# Patient Record
Sex: MALE | Race: WHITE | NOT HISPANIC OR LATINO | Employment: OTHER | URBAN - METROPOLITAN AREA
[De-identification: names, ages, dates, MRNs, and addresses within clinical notes are randomized per-mention and may not be internally consistent; named-entity substitution may affect disease eponyms.]

---

## 2017-01-03 ENCOUNTER — OFFICE VISIT (OUTPATIENT)
Dept: UROLOGY | Facility: CLINIC | Age: 57
End: 2017-01-03
Payer: MEDICARE

## 2017-01-03 VITALS
HEART RATE: 71 BPM | HEIGHT: 69 IN | WEIGHT: 235 LBS | BODY MASS INDEX: 34.8 KG/M2 | DIASTOLIC BLOOD PRESSURE: 80 MMHG | SYSTOLIC BLOOD PRESSURE: 120 MMHG

## 2017-01-03 DIAGNOSIS — N50.819 ORCHALGIA: Primary | ICD-10-CM

## 2017-01-03 PROCEDURE — 99213 OFFICE O/P EST LOW 20 MIN: CPT | Mod: PBBFAC | Performed by: UROLOGY

## 2017-01-03 PROCEDURE — 99214 OFFICE O/P EST MOD 30 MIN: CPT | Mod: S$PBB,,, | Performed by: UROLOGY

## 2017-01-03 PROCEDURE — 99999 PR PBB SHADOW E&M-EST. PATIENT-LVL III: CPT | Mod: PBBFAC,,, | Performed by: UROLOGY

## 2017-01-03 RX ORDER — TIZANIDINE 4 MG/1
TABLET ORAL
Refills: 2 | COMMUNITY
Start: 2016-12-13

## 2017-01-03 RX ORDER — MIRTAZAPINE 30 MG/1
TABLET, FILM COATED ORAL
Refills: 3 | COMMUNITY
Start: 2016-12-19 | End: 2017-01-05 | Stop reason: ALTCHOICE

## 2017-01-03 NOTE — MR AVS SNAPSHOT
[facility]  [address]  [place] [address]  Phone: [phone]                  Min Nunez   1/3/2017 10:30 AM   Office Visit    Description:  Male : 1960   Provider:  MARIA DE JESUS Hendrickson MD   Department:  [facility]           Reason for Visit     Follow-up           Diagnoses this Visit        Comments    Orchalgia    -  Primary            To Do List           Future Appointments        Provider Department Dept Phone    1/3/2017 10:30 AM MARIA DE JESUS Hendrickson MD [facility] [phone]    2/15/2017 10:30 AM Altaf Santacruz PA-C [facility] - Orthoped[facility] [phone]      Goals (5 Years of Data)     None      Follow-Up and Disposition     Return in about 3 months (around 4/3/2017) for Review X-ray.      [facility]     [facility] On Call Nurse [facility] -  Assistance  Registered nurses in the [facility] On Call Center provide clinical advisement, health education, appointment booking, and other advisory services.  Call for this free service at [phone].             Medications           Message regarding Medications     Verify the changes and/or additions to your medication regime listed below are the same as discussed with your clinician today.  If any of these changes or additions are incorrect, please notify your healthcare provider.             Verify that the below list of medications is an accurate representation of the medications you are currently taking.  If none reported, the list may be blank. If incorrect, please contact your healthcare provider. Carry this list with you in case of emergency.           Current Medications     albuterol (PROAIR HFA) 90 mcg/actuation inhaler Inhale 2 puffs into the lungs every 6 (six) hours as needed for Wheezing.    alprazolam (XANAX) 0.5 MG tablet Take 1 tablet (0.5 mg total) by mouth 2 (two) times daily as needed for Anxiety.    ASPIR-LOW 81 mg EC tablet Take 81 mg by mouth once daily.    busPIRone  (BUSPAR) 15 MG tablet Take 1 tablet (15 mg total) by mouth 3 (three) times daily.    chlorhexidine (PERIDEX) 0.12 % solution swish AND SPIT FIFTEEN MILLILITERS BY MOUTH TWICE DAILY    citalopram (CELEXA) 40 MG tablet Take 1 tablet (40 mg total) by mouth once daily.    divalproex (DEPAKOTE) 250 MG EC tablet 1 pill in morning, 1 pill in evening    eszopiclone (LUNESTA) 1 MG Tab Take 1 tablet (1 mg total) by mouth nightly as needed.    fenofibrate 160 MG Tab Take 160 mg by mouth once daily.    gabapentin (NEURONTIN) 300 MG capsule Take 300 mg by mouth once daily.    hydrOXYzine pamoate (VISTARIL) 25 MG Cap 1 pill 3x a day for 2 days, rest left over every 8 hours as needed for migraine    irbesartan (AVAPRO) 300 MG tablet Take 300 mg by mouth once daily.    isosorbide mononitrate (IMDUR) 60 MG 24 hr tablet Take 60 mg by mouth every morning.    meloxicam (MOBIC) 7.5 MG tablet Take 7.5 mg by mouth 2 (two) times daily.     metoprolol succinate (TOPROL-XL) 25 MG 24 hr tablet Take 25 mg by mouth 2 (two) times daily.     mirabegron (MYRBETRIQ) 25 mg Tb24 ER tablet Take 1 tablet (25 mg total) by mouth once daily.    mirtazapine (REMERON) 30 MG tablet Take 1 tablet (30 mg total) by mouth every evening.    nitroGLYCERIN (NITROSTAT) 0.4 MG SL tablet Place 0.4 mg under the tongue every 5 (five) minutes as needed for Chest pain.    NON FORMULARY MEDICATION Apply topically 4 (four) times daily as needed. Gabapentin 5%, Orphenadrine 2%, Lidocaine 2%, Prilocaine 2%    oxycodone-acetaminophen (PERCOCET)  mg per tablet Take 1 tablet by mouth every 4 (four) hours as needed for Pain.    pravastatin (PRAVACHOL) 80 MG tablet Take 80 mg by mouth once daily.    RANEXA 1,000 mg Tb12 Take 1,000 mg by mouth 2 (two) times daily.    RESTASIS 0.05 % ophthalmic emulsion Place 1 drop into both eyes 2 (two) times daily.    solifenacin (VESICARE) 10 MG tablet Take 1 tablet (10 mg total) by mouth once daily.    tamsulosin (FLOMAX) 0.4 mg Cp24 Take  "1 capsule (0.4 mg total) by mouth once daily.    tizanidine (ZANAFLEX) 4 MG tablet ONE TABLET BY MOUTH IN THE EVENING    trazodone (DESYREL) 150 MG tablet Take 2 tablets (300 mg total) by mouth nightly.           Clinical Reference Information           Vital Signs - Last Recorded  Most recent update: 1/3/2017  9:45 AM by Olivia Goss LPN    BP Pulse Ht Wt BMI    120/80 71 5' 9" (1.753 m) 106.6 kg (235 lb) 34.7 kg/m2      Blood Pressure          Most Recent Value    BP  120/80      Allergies as of 1/3/2017     No Known Allergies      Immunizations Administered on Date of Encounter - 1/3/2017     None      Orders Placed During Today's Visit     Future Labs/Procedures Expected by Expires    US Scrotum And Testicles  4/3/2017 1/3/2018      "

## 2017-01-03 NOTE — PROGRESS NOTES
Subjective:       Patient ID: Min Nunez is a 56 y.o. male who was last seen in this office 10/4/2016    Chief Complaint:   Chief Complaint   Patient presents with    Follow-up     testicular swelling       Orchalgia  He had a bilateral epididymectomy on 8/5/2016.  He continues to have right sided swelling and bilateral pain.  His right sided pain is worse than left.      Urinary frequency and Urgency Incontinence  He has some frequency and urgency that is now improving with Ditropan XL 10 mg.  He was using about 4 pads a day but is now only using about 2 napkins.  He would like additional medication.    ACTIVE MEDICAL ISSUES:  Patient Active Problem List   Diagnosis    Chest pain, exertional    Coronary artery disease, occlusive    Coronary atherosclerosis of unspecified type of vessel, native or graft    Orchalgia    Dizziness    Syncope    Abnormal brain MRI    Migraines    Atypical migraine    HA (headache)    Numbness    Facial droop    Faintness    Renal stone    Groin pain    Constipation    Hypotension       ALLERGIES AND MEDICATIONS: updated and reviewed.  Review of patient's allergies indicates:  No Known Allergies  Current Outpatient Prescriptions   Medication Sig    albuterol (PROAIR HFA) 90 mcg/actuation inhaler Inhale 2 puffs into the lungs every 6 (six) hours as needed for Wheezing.    alprazolam (XANAX) 0.5 MG tablet Take 1 tablet (0.5 mg total) by mouth 2 (two) times daily as needed for Anxiety.    ASPIR-LOW 81 mg EC tablet Take 81 mg by mouth once daily.    busPIRone (BUSPAR) 15 MG tablet Take 1 tablet (15 mg total) by mouth 3 (three) times daily.    chlorhexidine (PERIDEX) 0.12 % solution swish AND SPIT FIFTEEN MILLILITERS BY MOUTH TWICE DAILY    citalopram (CELEXA) 40 MG tablet Take 1 tablet (40 mg total) by mouth once daily.    divalproex (DEPAKOTE) 250 MG EC tablet 1 pill in morning, 1 pill in evening (Patient taking differently: 1 pill in morning, 2 pills  in  evening)    eszopiclone (LUNESTA) 1 MG Tab Take 1 tablet (1 mg total) by mouth nightly as needed.    fenofibrate 160 MG Tab Take 160 mg by mouth once daily.    gabapentin (NEURONTIN) 300 MG capsule Take 300 mg by mouth once daily.    hydrOXYzine pamoate (VISTARIL) 25 MG Cap 1 pill 3x a day for 2 days, rest left over every 8 hours as needed for migraine    irbesartan (AVAPRO) 300 MG tablet Take 300 mg by mouth once daily.    isosorbide mononitrate (IMDUR) 60 MG 24 hr tablet Take 60 mg by mouth every morning.    meloxicam (MOBIC) 7.5 MG tablet Take 7.5 mg by mouth 2 (two) times daily.     metoprolol succinate (TOPROL-XL) 25 MG 24 hr tablet Take 25 mg by mouth 2 (two) times daily.     mirabegron (MYRBETRIQ) 25 mg Tb24 ER tablet Take 1 tablet (25 mg total) by mouth once daily.    mirtazapine (REMERON) 30 MG tablet Take 1 tablet (30 mg total) by mouth every evening.    nitroGLYCERIN (NITROSTAT) 0.4 MG SL tablet Place 0.4 mg under the tongue every 5 (five) minutes as needed for Chest pain.    NON FORMULARY MEDICATION Apply topically 4 (four) times daily as needed. Gabapentin 5%, Orphenadrine 2%, Lidocaine 2%, Prilocaine 2%    oxycodone-acetaminophen (PERCOCET)  mg per tablet Take 1 tablet by mouth every 4 (four) hours as needed for Pain.    pravastatin (PRAVACHOL) 80 MG tablet Take 80 mg by mouth once daily.    RANEXA 1,000 mg Tb12 Take 1,000 mg by mouth 2 (two) times daily.    RESTASIS 0.05 % ophthalmic emulsion Place 1 drop into both eyes 2 (two) times daily.    solifenacin (VESICARE) 10 MG tablet Take 1 tablet (10 mg total) by mouth once daily.    tamsulosin (FLOMAX) 0.4 mg Cp24 Take 1 capsule (0.4 mg total) by mouth once daily.    tizanidine (ZANAFLEX) 4 MG tablet ONE TABLET BY MOUTH IN THE EVENING    trazodone (DESYREL) 150 MG tablet Take 2 tablets (300 mg total) by mouth nightly.     No current facility-administered medications for this visit.        Review of Systems   Constitutional:  "Negative for activity change, fatigue, fever and unexpected weight change.   HENT: Negative for congestion.    Eyes: Negative for redness.   Respiratory: Negative for chest tightness and shortness of breath.    Cardiovascular: Negative for chest pain and leg swelling.   Gastrointestinal: Negative for abdominal pain, constipation, diarrhea, nausea and vomiting.   Genitourinary: Positive for frequency, scrotal swelling and testicular pain. Negative for dysuria, flank pain, hematuria, penile pain, penile swelling and urgency.   Musculoskeletal: Negative for arthralgias and back pain.   Neurological: Negative for dizziness and light-headedness.   Psychiatric/Behavioral: Negative for behavioral problems and confusion. The patient is not nervous/anxious.    All other systems reviewed and are negative.      Objective:      Vitals:    01/03/17 0943   BP: 120/80   Pulse: 71   Weight: 106.6 kg (235 lb)   Height: 5' 9" (1.753 m)     Physical Exam   Nursing note and vitals reviewed.  Constitutional: He is oriented to person, place, and time. He appears well-developed and well-nourished.   HENT:   Head: Normocephalic.   Eyes: Conjunctivae are normal.   Neck: Normal range of motion. Neck supple. No tracheal deviation present. No thyromegaly present.   Cardiovascular: Normal rate and normal heart sounds.    Pulmonary/Chest: Effort normal and breath sounds normal. No respiratory distress. He has no wheezes.   Abdominal: Soft. Bowel sounds are normal. There is no hepatosplenomegaly. There is no tenderness. There is no rebound and no CVA tenderness. No hernia.   Genitourinary: Penis normal.   Genitourinary Comments: Both testicles palpably normal.  Right cord with a fluid collection.   Musculoskeletal: Normal range of motion. He exhibits no edema or tenderness.   Lymphadenopathy:     He has no cervical adenopathy.   Neurological: He is alert and oriented to person, place, and time.   Skin: Skin is warm and dry. No rash noted. No " erythema.     Psychiatric: He has a normal mood and affect. His behavior is normal. Judgment and thought content normal.       Urine dipstick shows negative for all components.  Micro exam: negative for WBC's or RBC's.      US Scrotum And Testicles   Status: Final result   MyChart Results Release   MyChart Status: Active Results Release   Signed by   Signed Credentials Date/Time    Phone Pager   JOSEPH IWLCOX MD 12/01/2016 07:52 [phone]   PACS Images   Show images for US Scrotum And Testicles   Reviewed By List   Halle Whipple MA on 12/1/2016  8:34 AM   MARIA DE JESUS Hendrickson MD on 12/1/2016  7:53 AM   External Result Report   External Result Report   Narrative   Scrotal ultrasound    Grayscale, color and spectral Doppler imaging of bilateral testicles was performed.    The right testicle measures 4.7 x 3.1 x 3.1 cm. The left testicle measures 4.3 x 2.7 x 3.0 cm. Both have a homogenous echotexture with normal flow. Symmetric flow seen between both testicles.    There is a 4.7 cm spermatocele seen within the right epididymal head. There is normal appearance of the left epididymal head, the spermatocele appears to contain internal debris. Its appearance is similar to as in prior ultrasound.   Impression    There is a large right spermatocele. Otherwise there is a normal sonographic appearance of the testicles.      Electronically signed by: JOSEPH WILCOX MD  Date: 12/01/16  Time: 07:52     Encounter   View Encounter      Reviewed By   Halle Whipple MA on 12/1/2016  8:34 AM   MARIA DE JESUS Hendrickson MD on 12/1/2016  7:53 AM       Assessment:       1. Orchalgia          Plan:       1. Orchalgia    - US Scrotum And Testicles; Future  He most likely has a hydrocele of the spermatic cord and not a spermatocele since he is s/p epididymectomy.    I do not recommend any kind of exercise that would put pressure on his perineum or scrotum.              Return in about 3 months (around 4/3/2017) for Review  X-ray.

## 2017-01-05 ENCOUNTER — OFFICE VISIT (OUTPATIENT)
Dept: PSYCHIATRY | Facility: CLINIC | Age: 57
End: 2017-01-05
Payer: MEDICARE

## 2017-01-05 VITALS
DIASTOLIC BLOOD PRESSURE: 62 MMHG | HEART RATE: 81 BPM | WEIGHT: 243 LBS | BODY MASS INDEX: 35.99 KG/M2 | HEIGHT: 69 IN | SYSTOLIC BLOOD PRESSURE: 110 MMHG

## 2017-01-05 DIAGNOSIS — F41.9 ANXIETY: Primary | ICD-10-CM

## 2017-01-05 PROCEDURE — 99214 OFFICE O/P EST MOD 30 MIN: CPT | Mod: S$PBB,,, | Performed by: PSYCHIATRY & NEUROLOGY

## 2017-01-05 PROCEDURE — 99212 OFFICE O/P EST SF 10 MIN: CPT | Mod: PBBFAC | Performed by: PSYCHIATRY & NEUROLOGY

## 2017-01-05 PROCEDURE — 99999 PR PBB SHADOW E&M-EST. PATIENT-LVL II: CPT | Mod: PBBFAC,,, | Performed by: PSYCHIATRY & NEUROLOGY

## 2017-01-05 RX ORDER — TRAZODONE HYDROCHLORIDE 150 MG/1
300 TABLET ORAL NIGHTLY
Qty: 60 TABLET | Refills: 3 | Status: SHIPPED | OUTPATIENT
Start: 2017-01-05 | End: 2017-04-11 | Stop reason: SDUPTHER

## 2017-01-05 RX ORDER — ALPRAZOLAM 0.5 MG/1
0.5 TABLET ORAL 2 TIMES DAILY PRN
Qty: 30 TABLET | Refills: 2 | Status: SHIPPED | OUTPATIENT
Start: 2017-01-05 | End: 2017-04-11 | Stop reason: SDUPTHER

## 2017-01-05 RX ORDER — BUSPIRONE HYDROCHLORIDE 15 MG/1
15 TABLET ORAL 3 TIMES DAILY
Qty: 90 TABLET | Refills: 3 | Status: SHIPPED | OUTPATIENT
Start: 2017-01-05 | End: 2017-04-11 | Stop reason: SDUPTHER

## 2017-01-05 RX ORDER — CITALOPRAM 40 MG/1
40 TABLET, FILM COATED ORAL DAILY
Qty: 30 TABLET | Refills: 3 | Status: SHIPPED | OUTPATIENT
Start: 2017-01-05 | End: 2017-04-11 | Stop reason: SDUPTHER

## 2017-01-05 NOTE — MR AVS SNAPSHOT
[facility]  [address]  [facility] [address]  Phone: [phone]  Fax: [phone]                  Min Nunez   2017 4:30 PM   Office Visit    Description:  Male : 1960   Provider:  Sampson Luke MD   Department:  [facility]           Diagnoses this Visit        Comments    Anxiety    -  Primary            To Do List           Future Appointments        Provider Department Dept Phone    2017 4:30 PM Sampson Luke MD [facility] [phone]    2/15/2017 10:30 AM Altaf Santacruz PA-C [facility] [phone]    4/3/2017 11:15 AM [place] [address] - ROOM 2 [facility] [place] [phone]    2017 10:15 AM MARIA DE JESUS Hendrickson MD [facility] [phone]    2017 2:00 PM Sampson Luke MD [facility] [phone]      Goals (5 Years of Data)     None       These Medications        Disp Refills Start End    trazodone (DESYREL) 150 MG tablet 60 tablet 3 2017    Take 2 tablets (300 mg total) by mouth nightly. - Oral    Pharmacy: [facility] - [address] Ph #: [phone]       alprazolam (XANAX) 0.5 MG tablet 30 tablet 2 2017    Take 1 tablet (0.5 mg total) by mouth 2 (two) times daily as needed for Anxiety. - Oral    Pharmacy: [facility] - [facility] [address] Ph #: [phone]         [facility] On Call     [facility] On Call Nurse Care Line -  Assistance  Registered nurses in the [facility] On Call Center provide clinical advisement, health education, appointment booking, and other advisory services.  Call for this free service at [phone].             Medications           Message regarding Medications     Verify the changes and/or additions to your medication regime listed below are the same as discussed with your clinician today.  If any of these changes or additions are incorrect, please  notify your healthcare provider.        STOP taking these medications     mirtazapine (REMERON) 30 MG tablet Take 1 tablet (30 mg total) by mouth every evening.    eszopiclone (LUNESTA) 1 MG Tab Take 1 tablet (1 mg total) by mouth nightly as needed.           Verify that the below list of medications is an accurate representation of the medications you are currently taking.  If none reported, the list may be blank. If incorrect, please contact your healthcare provider. Carry this list with you in case of emergency.           Current Medications     albuterol (PROAIR HFA) 90 mcg/actuation inhaler Inhale 2 puffs into the lungs every 6 (six) hours as needed for Wheezing.    alprazolam (XANAX) 0.5 MG tablet Take 1 tablet (0.5 mg total) by mouth 2 (two) times daily as needed for Anxiety.    ASPIR-LOW 81 mg EC tablet Take 81 mg by mouth once daily.    busPIRone (BUSPAR) 15 MG tablet Take 1 tablet (15 mg total) by mouth 3 (three) times daily.    chlorhexidine (PERIDEX) 0.12 % solution swish AND SPIT FIFTEEN MILLILITERS BY MOUTH TWICE DAILY    citalopram (CELEXA) 40 MG tablet Take 1 tablet (40 mg total) by mouth once daily.    divalproex (DEPAKOTE) 250 MG EC tablet 1 pill in morning, 1 pill in evening    fenofibrate 160 MG Tab Take 160 mg by mouth once daily.    gabapentin (NEURONTIN) 300 MG capsule Take 300 mg by mouth once daily.    hydrOXYzine pamoate (VISTARIL) 25 MG Cap 1 pill 3x a day for 2 days, rest left over every 8 hours as needed for migraine    irbesartan (AVAPRO) 300 MG tablet Take 300 mg by mouth once daily.    isosorbide mononitrate (IMDUR) 60 MG 24 hr tablet Take 60 mg by mouth every morning.    meloxicam (MOBIC) 7.5 MG tablet Take 7.5 mg by mouth 2 (two) times daily.     metoprolol succinate (TOPROL-XL) 25 MG 24 hr tablet Take 25 mg by mouth 2 (two) times daily.     mirabegron (MYRBETRIQ) 25 mg Tb24 ER tablet Take 1 tablet (25 mg total) by mouth once daily.    nitroGLYCERIN (NITROSTAT) 0.4 MG SL tablet  "Place 0.4 mg under the tongue every 5 (five) minutes as needed for Chest pain.    NON FORMULARY MEDICATION Apply topically 4 (four) times daily as needed. Gabapentin 5%, Orphenadrine 2%, Lidocaine 2%, Prilocaine 2%    oxycodone-acetaminophen (PERCOCET)  mg per tablet Take 1 tablet by mouth every 4 (four) hours as needed for Pain.    pravastatin (PRAVACHOL) 80 MG tablet Take 80 mg by mouth once daily.    RANEXA 1,000 mg Tb12 Take 1,000 mg by mouth 2 (two) times daily.    RESTASIS 0.05 % ophthalmic emulsion Place 1 drop into both eyes 2 (two) times daily.    solifenacin (VESICARE) 10 MG tablet Take 1 tablet (10 mg total) by mouth once daily.    tamsulosin (FLOMAX) 0.4 mg Cp24 Take 1 capsule (0.4 mg total) by mouth once daily.    tizanidine (ZANAFLEX) 4 MG tablet ONE TABLET BY MOUTH IN THE EVENING    trazodone (DESYREL) 150 MG tablet Take 2 tablets (300 mg total) by mouth nightly.           Clinical Reference Information           Vital Signs - Last Recorded  Most recent update: 1/5/2017  3:54 PM by Mendoza Payne    BP Pulse Ht Wt BMI    110/62 81 5' 9" (1.753 m) 110.2 kg (243 lb) 35.88 kg/m2      Blood Pressure          Most Recent Value    BP  110/62      Allergies as of 1/5/2017     No Known Allergies      Immunizations Administered on Date of Encounter - 1/5/2017     None      "

## 2017-01-05 NOTE — PROGRESS NOTES
ESTABLISHED OUTPATIENT VISIT   E/M LEVEL 4: [id number]    ENCOUNTER DATE: 1/5/2017  SITE: [facility]    HISTORY    CHIEF COMPLAINT   Min Nunez is a 56 y.o. male who presents for follow up of anxiety/depression.      HPI   Anxiety continues.    Psychosocial stress related to son, wife, and physical problems.    Psychiatric Review Of Systems - Is patient experiencing or having changes in:  sleep: Trazodone  appetite: no  weight: no  energy/anergy: no  interest/pleasure/anhedonia: no  somatic symptoms: no  libido: no  anxiety/panic: much anxiety, panic attacks at times  guilty/hopelessness: no  concentration: no  S.I.B.s/risky behavior: no  Irritability: no  Racing thoughts: no  Impulsive behaviors: no  Paranoia:no  AVH:no    Recent alcohol: no  Recent drug: no    Medical ROS   Multiple physical problems     PAST MEDICAL, FAMILY AND SOCIAL HISTORY: The patient's past medical, family and social history have been reviewed and updated as appropriate within the electronic medical record - see encounter notes.    PSYCHOTROPIC MEDICATIONS   Xanax prn, Trazodone varying doses at bedtime, Buspar 15 mg tid, Celexa 40 mg qam, Depakote 250 mg qam, Depakote 500 mg at bedtime[for headaches], Neurontin 300 mg at bedtime[for pain], Hydroxyzine prn for migraines    Scheduled and PRN Medications     Current Outpatient Prescriptions:     albuterol (PROAIR HFA) 90 mcg/actuation inhaler, Inhale 2 puffs into the lungs every 6 (six) hours as needed for Wheezing., Disp: , Rfl:     alprazolam (XANAX) 0.5 MG tablet, Take 1 tablet (0.5 mg total) by mouth 2 (two) times daily as needed for Anxiety., Disp: 30 tablet, Rfl: 2    ASPIR-LOW 81 mg EC tablet, Take 81 mg by mouth once daily., Disp: , Rfl: 3    busPIRone (BUSPAR) 15 MG tablet, Take 1 tablet (15 mg total) by mouth 3 (three) times daily., Disp: 90 tablet, Rfl: 3    chlorhexidine (PERIDEX) 0.12 % solution, swish AND SPIT FIFTEEN MILLILITERS BY MOUTH TWICE  DAILY, Disp: , Rfl: 0    citalopram (CELEXA) 40 MG tablet, Take 1 tablet (40 mg total) by mouth once daily., Disp: 30 tablet, Rfl: 3    divalproex (DEPAKOTE) 250 MG EC tablet, 1 pill in morning, 1 pill in evening (Patient taking differently: 1 pill in morning, 2 pills  in evening), Disp: 60 tablet, Rfl: 6    eszopiclone (LUNESTA) 1 MG Tab, Take 1 tablet (1 mg total) by mouth nightly as needed., Disp: , Rfl: 3    fenofibrate 160 MG Tab, Take 160 mg by mouth once daily., Disp: , Rfl:     gabapentin (NEURONTIN) 300 MG capsule, Take 300 mg by mouth once daily., Disp: , Rfl:     hydrOXYzine pamoate (VISTARIL) 25 MG Cap, 1 pill 3x a day for 2 days, rest left over every 8 hours as needed for migraine, Disp: 15 capsule, Rfl: 3    irbesartan (AVAPRO) 300 MG tablet, Take 300 mg by mouth once daily., Disp: , Rfl:     isosorbide mononitrate (IMDUR) 60 MG 24 hr tablet, Take 60 mg by mouth every morning., Disp: , Rfl: 3    meloxicam (MOBIC) 7.5 MG tablet, Take 7.5 mg by mouth 2 (two) times daily. , Disp: , Rfl:     metoprolol succinate (TOPROL-XL) 25 MG 24 hr tablet, Take 25 mg by mouth 2 (two) times daily. , Disp: , Rfl:     mirabegron (MYRBETRIQ) 25 mg Tb24 ER tablet, Take 1 tablet (25 mg total) by mouth once daily., Disp: 90 tablet, Rfl: 3    mirtazapine (REMERON) 30 MG tablet, Take 1 tablet (30 mg total) by mouth every evening., Disp: , Rfl: 3    nitroGLYCERIN (NITROSTAT) 0.4 MG SL tablet, Place 0.4 mg under the tongue every 5 (five) minutes as needed for Chest pain., Disp: , Rfl:     NON FORMULARY MEDICATION, Apply topically 4 (four) times daily as needed. Gabapentin 5%, Orphenadrine 2%, Lidocaine 2%, Prilocaine 2%, Disp: , Rfl:     oxycodone-acetaminophen (PERCOCET)  mg per tablet, Take 1 tablet by mouth every 4 (four) hours as needed for Pain., Disp: 30 tablet, Rfl: 0    pravastatin (PRAVACHOL) 80 MG tablet, Take 80 mg by mouth once daily., Disp: , Rfl: 4    RANEXA 1,000 mg Tb12, Take 1,000 mg by  mouth 2 (two) times daily., Disp: , Rfl: 3    RESTASIS 0.05 % ophthalmic emulsion, Place 1 drop into both eyes 2 (two) times daily., Disp: , Rfl: 4    solifenacin (VESICARE) 10 MG tablet, Take 1 tablet (10 mg total) by mouth once daily., Disp: 90 tablet, Rfl: 3    tamsulosin (FLOMAX) 0.4 mg Cp24, Take 1 capsule (0.4 mg total) by mouth once daily., Disp: 90 capsule, Rfl: 3    tizanidine (ZANAFLEX) 4 MG tablet, ONE TABLET BY MOUTH IN THE EVENING, Disp: , Rfl: 2    trazodone (DESYREL) 150 MG tablet, Take 2 tablets (300 mg total) by mouth nightly., Disp: 60 tablet, Rfl: 3    EXAMINATION    Vitals:    01/05/17 1550   BP: 110/62   Pulse: 81     Weight 243 lb's    CONSTITUTIONAL  General Appearance: well nourished    MUSCULOSKELETAL  Muscle Strength and Tone: normal strength and tone  Abnormal Involuntary Movements: no abnormal movement noted  Gait and Station: normal gait    PSYCHIATRIC   Level of Consciousness: alert  Orientation: oriented to person, place and time  Grooming: well groomed  Psychomotor Behavior: no restlessness/agitation  Speech: normal in rate, rhythm and volume  Language: normal vocabulary  Mood: anxious at times  Affect: full range and appropriate  Thought Process: logical and goal directed  Associations: intact associations  Thought Content: no SI/HI  Memory: grossly intact  Attention: intact to content of interview  Fund of Knowledge: appears adequate  Insight: good  Judgement: good    MEDICAL DECISION MAKING    DIAGNOSES  Anxiety d/o, unspecified    PROBLEM LIST AND MANAGEMENT PLANS    - anxiety:   Trazodone 300 mg at bedtime  continue Xanax prn  Continue Celexa and Buspar as above for now  - rtc one month     Time with patient: 20 min    LABORATORY DATA  Admission on 11/08/2016, Discharged on 11/08/2016   Component Date Value Ref Range Status    Specimen UA 11/08/2016 Urine, Clean Catch   Final    Color, UA 11/08/2016 Yellow  Yellow, Straw, Leonila Final    Appearance, UA 11/08/2016 Clear   Clear Final    pH, UA 11/08/2016 8.0  5.0 - 8.0 Final    Specific [place], UA 11/08/2016 1.020  1.005 - 1.030 Final    Protein, UA 11/08/2016 Trace* Negative Final    Comment: Recommend a 24 hour urine protein or a urine   protein/creatinine ratio if globulin induced proteinuria is  clinically suspected.      Glucose, UA 11/08/2016 Negative  Negative Final    Ketones, UA 11/08/2016 Negative  Negative Final    Bilirubin (UA) 11/08/2016 Negative  Negative Final    Occult Blood UA 11/08/2016 Negative  Negative Final    Nitrite, UA 11/08/2016 Negative  Negative Final    Urobilinogen, UA 11/08/2016 Negative  <2.0 EU/dL Final    Leukocytes, UA 11/08/2016 Negative  Negative Final    WBC 11/08/2016 5.33  3.90 - 12.70 K/uL Final    RBC 11/08/2016 4.90  4.60 - 6.20 M/uL Final    Hemoglobin 11/08/2016 14.6  14.0 - 18.0 g/dL Final    Hematocrit 11/08/2016 42.6  40.0 - 54.0 % Final    MCV 11/08/2016 87  82 - 98 fL Final    MCH 11/08/2016 29.8  27.0 - 31.0 pg Final    MCHC 11/08/2016 34.3  32.0 - 36.0 % Final    RDW 11/08/2016 14.4  11.5 - 14.5 % Final    Platelets 11/08/2016 157  150 - 350 K/uL Final    MPV 11/08/2016 11.6  9.2 - 12.9 fL Final    Gran # 11/08/2016 2.2  1.8 - 7.7 K/uL Final    Lymph # 11/08/2016 2.3  1.0 - 4.8 K/uL Final    Mono # 11/08/2016 0.7  0.3 - 1.0 K/uL Final    Eos # 11/08/2016 0.1  0.0 - 0.5 K/uL Final    Baso # 11/08/2016 0.04  0.00 - 0.20 K/uL Final    Gran% 11/08/2016 41.0  38.0 - 73.0 % Final    Lymph% 11/08/2016 43.9  18.0 - 48.0 % Final    Mono% 11/08/2016 12.8  4.0 - 15.0 % Final    Eosinophil% 11/08/2016 1.1  0.0 - 8.0 % Final    Basophil% 11/08/2016 0.8  0.0 - 1.9 % Final    Differential Method 11/08/2016 Automated   Final    Lipase 11/08/2016 35  4 - 60 U/L Final    Sodium 11/08/2016 143  136 - 145 mmol/L Final    Potassium 11/08/2016 4.2  3.5 - 5.1 mmol/L Final    Chloride 11/08/2016 108  95 - 110 mmol/L Final    CO2 11/08/2016 27  23 - 29 mmol/L Final     Glucose 11/08/2016 103  70 - 110 mg/dL Final    BUN, Bld 11/08/2016 12  6 - 20 mg/dL Final    Creatinine 11/08/2016 0.9  0.5 - 1.4 mg/dL Final    Calcium 11/08/2016 8.7  8.7 - 10.5 mg/dL Final    Total Protein 11/08/2016 7.6  6.0 - 8.4 g/dL Final    Albumin 11/08/2016 3.9  3.5 - 5.2 g/dL Final    Total Bilirubin 11/08/2016 0.4  0.1 - 1.0 mg/dL Final    Comment: For infants and newborns, interpretation of results should be based  on gestational age, weight and in agreement with clinical  observations.  Premature Infant recommended reference ranges:  Up to 24 hours.............<8.0 mg/dL  Up to 48 hours............<12.0 mg/dL  3-5 days..................<15.0 mg/dL  6-29 days.................<15.0 mg/dL      Alkaline Phosphatase 11/08/2016 57  55 - 135 U/L Final    AST 11/08/2016 24  10 - 40 U/L Final    ALT 11/08/2016 39  10 - 44 U/L Final    Anion Gap 11/08/2016 8  8 - 16 mmol/L Final    eGFR if African American 11/08/2016 >60.0  >60 mL/min/1.73 m^2 Final    eGFR if non African American 11/08/2016 >60.0  >60 mL/min/1.73 m^2 Final    Comment: Calculation used to obtain the estimated glomerular filtration  rate (eGFR) is the CKD-EPI equation. Since race is unknown   in our information system, the eGFR values for   -American and Non--American patients are given   for each creatinine result.             Sampson Luke

## 2017-01-25 ENCOUNTER — TELEPHONE (OUTPATIENT)
Dept: UROLOGY | Facility: CLINIC | Age: 57
End: 2017-01-25

## 2017-01-25 NOTE — TELEPHONE ENCOUNTER
----- Message from Miguelina Valderrama sent at 1/25/2017  9:52 AM CST -----  Contact: [facility] called to discuss pt' smeds. mirabegron (MYRBETRIQ) 25 mg Tb24 ER tablet & solifenacin (VESICARE) 10 MG tablet. [phone]

## 2017-02-15 ENCOUNTER — OFFICE VISIT (OUTPATIENT)
Dept: ORTHOPEDICS | Facility: CLINIC | Age: 57
End: 2017-02-15
Payer: MEDICARE

## 2017-02-15 VITALS
BODY MASS INDEX: 35.98 KG/M2 | HEIGHT: 69 IN | DIASTOLIC BLOOD PRESSURE: 77 MMHG | RESPIRATION RATE: 16 BRPM | SYSTOLIC BLOOD PRESSURE: 105 MMHG | WEIGHT: 242.94 LBS | HEART RATE: 73 BPM

## 2017-02-15 DIAGNOSIS — G89.29 CHRONIC PAIN OF BOTH KNEES: Primary | ICD-10-CM

## 2017-02-15 DIAGNOSIS — M25.562 CHRONIC PAIN OF BOTH KNEES: Primary | ICD-10-CM

## 2017-02-15 DIAGNOSIS — M25.561 CHRONIC PAIN OF BOTH KNEES: Primary | ICD-10-CM

## 2017-02-15 PROCEDURE — 99215 OFFICE O/P EST HI 40 MIN: CPT | Mod: PBBFAC | Performed by: PHYSICIAN ASSISTANT

## 2017-02-15 PROCEDURE — 99213 OFFICE O/P EST LOW 20 MIN: CPT | Mod: S$PBB,,, | Performed by: PHYSICIAN ASSISTANT

## 2017-02-15 PROCEDURE — 99999 PR PBB SHADOW E&M-EST. PATIENT-LVL V: CPT | Mod: PBBFAC,,, | Performed by: PHYSICIAN ASSISTANT

## 2017-02-15 NOTE — PROGRESS NOTES
SUBJECTIVE:     Chief Complaint & History of Present Illness:  Min Nunez is a Established patient 56 y.o. male who is seen here today with a complaint of    Chief Complaint   Patient presents with    Left Knee - Pain    Right Knee - Pain    .  He is a patient well known to us has some long-standing issues with bilateral knee pain was last seen and treated in the clinic in November 2016 at which time he had undergone bilateral cortisone injections of the knees.  He reports he got little to no relief from this and continues to have pain in both knees and difficulty with prolonged standing and ambulation.  He is wearing bilateral hinged knee braces that he has been wearing daily for the past 3 years and is on currently being evaluated for a possible surgical interventions for recurrent scrotal mass as well as multiple cervical and lumbar spine issues  On a scale of 1-10, with 10 being worst pain imaginable, he rates this pain as 4 on good days and 7 on bad days.  he describes the pain as sore and achy.    Review of patient's allergies indicates:  No Known Allergies      Current Outpatient Prescriptions   Medication Sig Dispense Refill    albuterol (PROAIR HFA) 90 mcg/actuation inhaler Inhale 2 puffs into the lungs every 6 (six) hours as needed for Wheezing.      alprazolam (XANAX) 0.5 MG tablet Take 1 tablet (0.5 mg total) by mouth 2 (two) times daily as needed for Anxiety. 30 tablet 2    ASPIR-LOW 81 mg EC tablet Take 81 mg by mouth once daily.  3    busPIRone (BUSPAR) 15 MG tablet Take 1 tablet (15 mg total) by mouth 3 (three) times daily. 90 tablet 3    chlorhexidine (PERIDEX) 0.12 % solution swish AND SPIT FIFTEEN MILLILITERS BY MOUTH TWICE DAILY  0    citalopram (CELEXA) 40 MG tablet Take 1 tablet (40 mg total) by mouth once daily. 30 tablet 3    divalproex (DEPAKOTE) 250 MG EC tablet 1 pill in morning, 1 pill in evening (Patient taking differently: 1 pill in morning, 2 pills  in evening) 60  tablet 6    fenofibrate 160 MG Tab Take 160 mg by mouth once daily.      gabapentin (NEURONTIN) 300 MG capsule Take 300 mg by mouth once daily.      hydrOXYzine pamoate (VISTARIL) 25 MG Cap 1 pill 3x a day for 2 days, rest left over every 8 hours as needed for migraine 15 capsule 3    irbesartan (AVAPRO) 300 MG tablet Take 300 mg by mouth once daily.      isosorbide mononitrate (IMDUR) 60 MG 24 hr tablet Take 60 mg by mouth every morning.  3    meloxicam (MOBIC) 7.5 MG tablet Take 7.5 mg by mouth 2 (two) times daily.       metoprolol succinate (TOPROL-XL) 25 MG 24 hr tablet Take 25 mg by mouth 2 (two) times daily.       mirabegron (MYRBETRIQ) 25 mg Tb24 ER tablet Take 1 tablet (25 mg total) by mouth once daily. 90 tablet 3    nitroGLYCERIN (NITROSTAT) 0.4 MG SL tablet Place 0.4 mg under the tongue every 5 (five) minutes as needed for Chest pain.      NON FORMULARY MEDICATION Apply topically 4 (four) times daily as needed. Gabapentin 5%, Orphenadrine 2%, Lidocaine 2%, Prilocaine 2%      oxycodone-acetaminophen (PERCOCET)  mg per tablet Take 1 tablet by mouth every 4 (four) hours as needed for Pain. 30 tablet 0    pravastatin (PRAVACHOL) 80 MG tablet Take 80 mg by mouth once daily.  4    RANEXA 1,000 mg Tb12 Take 1,000 mg by mouth 2 (two) times daily.  3    RESTASIS 0.05 % ophthalmic emulsion Place 1 drop into both eyes 2 (two) times daily.  4    solifenacin (VESICARE) 10 MG tablet Take 1 tablet (10 mg total) by mouth once daily. 90 tablet 3    tamsulosin (FLOMAX) 0.4 mg Cp24 Take 1 capsule (0.4 mg total) by mouth once daily. 90 capsule 3    tizanidine (ZANAFLEX) 4 MG tablet ONE TABLET BY MOUTH IN THE EVENING  2    trazodone (DESYREL) 150 MG tablet Take 2 tablets (300 mg total) by mouth nightly. 60 tablet 3     No current facility-administered medications for this visit.        Past Medical History   Diagnosis Date    Arthritis     Black-out (not amnesia)     Cardiac angina      pt states  "intermittent since 2014. Regularly sees Dr. Flaherty    Coronary artery disease     Hearing loss     Hypertension     Neck problem      problems C5-6-pain radiates to both arms and down spine    Stroke      mini  stroke end of 2014 or beginning of 2015       Past Surgical History   Procedure Laterality Date    Cyst removal Left      posterior wrist    Coronary stent placement       x 2 stents    Coronary angioplasty       x 3 angiograms 2014. 1st angio no stents place. 2nd Angio x2 stents placed., 3rd angio f/u to check coronary arteries again. no intervention taht last time    Testicular surgery 2016         Vital Signs (Most Recent)  Vitals:    02/15/17 1209   BP: 105/77   Pulse: 73   Resp: 16           Review of Systems:  ROS:  Constitutional: no fever or chills  Eyes: no visual changes  ENT: no nasal congestion or sore throat  Respiratory: no cough or shortness of breath  Cardiovascular: no chest pain or palpitations, Positive CAD, positive stroke, positive angina,  Gastrointestinal: no nausea or vomiting, tolerating diet  Genitourinary: no hematuria or dysuria  Integument/Breast: no rash or pruritis  Hematologic/Lymphatic: no easy bruising or lymphadenopathy  Musculoskeletal: no arthralgias or myalgias  Neurological: no seizures or tremors  Behavioral/Psych: no auditory or visual hallucinations  Endocrine: no heat or cold intolerance                OBJECTIVE:     PHYSICAL EXAM:  Height: 5' 9" (175.3 cm) Weight: 110.2 kg (242 lb 15.2 oz), General Appearance: Well nourished, well developed, in no acute distress.  Neurological: Mood & affect are normal.  bilateral Knee Exam:  Knee Range of Motion:0-100 degrees flexion   Effusion:none  Condition of skin:intact  Location of tenderness globally   Strength:limited by pain and 5 of 5  Stability:  stable to testing  Varus /Valgus stress:  normal  Shadia:   negative/negative  Hip Examination:  normal    RADIOGRAPHS:  X-rays from previous visit reviewed by me " today demonstrate mild arthritic changes about both knees he maintains very good joint spaces both medial and laterally no marked osteophytic spurring sclerotic changes or evidence of fracture dislocation or other bony abnormalities    ASSESSMENT/PLAN:     Plan: We discussed with the patient at length all the different treatment options available for arthrosis of the knee including anti-inflammatories, acetaminophen, rest, ice, knee strengthening exercise, occasional cortisone injections for temporary relief, Viscosupplimentation injections, arthroscopic debridement osteotomy, and finally knee arthroplasty.   I recommended viscous supplementation injections at this juncture the patient would like to hold off on any treatments until after he has dressed his a surgical issues both for the scrotum and the back he will contact me after he is completed treatment for these conditions and we will order Visco supplementation injections at that time

## 2017-02-15 NOTE — LETTER
February 15, 2017      [facility]  [address] [address]           [facility] - [facility]oped[facility]  [address]  [facility] [address]  Phone: [phone]          Patient: Min Nunez   MR Number: [id number]   YOB: 1960   Date of Visit: 2/15/2017       Dear Dr. [facility].:    Thank you for referring Min Nunez to me for evaluation. Attached you will find relevant portions of my assessment and plan of care.    If you have questions, please do not hesitate to call me. I look forward to following Min Nunez along with you.    Sincerely,    Altaf Santacruz PA-C    Enclosure  CC:  No Recipients    If you would like to receive this communication electronically, please contact [email or web address] or [phone] to request more information on Intr[employer] Link access.    For providers and/or their staff who would like to refer a patient to [facility], please contact us through our one-stop-shop provider referral line, [facility] , at [phone].    If you feel you have received this communication in error or would no longer like to receive these types of communications, please e-mail [email or web address]

## 2017-02-21 ENCOUNTER — TELEPHONE (OUTPATIENT)
Dept: ORTHOPEDICS | Facility: CLINIC | Age: 57
End: 2017-02-21

## 2017-02-21 NOTE — TELEPHONE ENCOUNTER
Returned call. Spoke with patient. States he cannot walk long distances without knee brace. He states he cannot schedule visco supplementation injections or physical therapy due to current situations. He will call back to schedule appointment when available.

## 2017-02-21 NOTE — TELEPHONE ENCOUNTER
----- Message from Soha Ramesh sent at 2/21/2017  9:03 AM CST -----  Contact: self@ home   Pt is calling to speak with melissa mcneillga he stated that he is unable to walk with the knee brace.

## 2017-04-03 ENCOUNTER — HOSPITAL ENCOUNTER (OUTPATIENT)
Dept: RADIOLOGY | Facility: HOSPITAL | Age: 57
Discharge: HOME | End: 2017-04-03
Attending: UROLOGY
Payer: MEDICARE

## 2017-04-03 DIAGNOSIS — N50.819 ORCHALGIA: ICD-10-CM

## 2017-04-03 PROCEDURE — 76870 US EXAM SCROTUM: CPT | Mod: TC

## 2017-04-03 PROCEDURE — 76870 US EXAM SCROTUM: CPT | Mod: 26,,, | Performed by: RADIOLOGY

## 2017-04-04 ENCOUNTER — OFFICE VISIT (OUTPATIENT)
Dept: UROLOGY | Facility: CLINIC | Age: 57
End: 2017-04-04
Payer: MEDICARE

## 2017-04-04 VITALS
BODY MASS INDEX: 34.78 KG/M2 | WEIGHT: 234.81 LBS | SYSTOLIC BLOOD PRESSURE: 130 MMHG | HEIGHT: 69 IN | HEART RATE: 70 BPM | DIASTOLIC BLOOD PRESSURE: 72 MMHG

## 2017-04-04 DIAGNOSIS — N50.819 ORCHALGIA: ICD-10-CM

## 2017-04-04 DIAGNOSIS — N40.0 BENIGN NODULAR PROSTATIC HYPERPLASIA WITHOUT LOWER URINARY TRACT SYMPTOMS: ICD-10-CM

## 2017-04-04 DIAGNOSIS — R10.30 GROIN PAIN, UNSPECIFIED LATERALITY: Primary | ICD-10-CM

## 2017-04-04 PROCEDURE — 99213 OFFICE O/P EST LOW 20 MIN: CPT | Mod: S$PBB,,, | Performed by: UROLOGY

## 2017-04-04 PROCEDURE — 99213 OFFICE O/P EST LOW 20 MIN: CPT | Mod: PBBFAC | Performed by: UROLOGY

## 2017-04-04 PROCEDURE — 99999 PR PBB SHADOW E&M-EST. PATIENT-LVL III: CPT | Mod: PBBFAC,,, | Performed by: UROLOGY

## 2017-04-04 NOTE — PROGRESS NOTES
Subjective:       Patient ID: Min Nunez is a 56 y.o. male who was last seen in this office 1/3/2017    Chief Complaint:   Chief Complaint   Patient presents with    Follow-up     f/u from ultrasound      Orchalgia  He had a bilateral epididymectomy on 8/5/2016.  He continues to have right sided swelling and bilateral pain.  His right sided pain is worse than left.    He noted scrotal swelling a few weeks ago after ejaculation.  This resolved over time.    Urinary frequency and Urgency Incontinence  He has some frequency and urgency that is now improving with Ditropan XL 10 mg.  He was using about 4 pads a day but is now only using about 2 napkins.  He would like additional medication.    ACTIVE MEDICAL ISSUES:  Patient Active Problem List   Diagnosis    Chest pain, exertional    Coronary artery disease, occlusive    Coronary atherosclerosis of unspecified type of vessel, native or graft    Orchalgia    Dizziness    Syncope    Abnormal brain MRI    Migraines    Atypical migraine    HA (headache)    Numbness    Facial droop    Faintness    Renal stone    Groin pain    Constipation    Hypotension       ALLERGIES AND MEDICATIONS: updated and reviewed.  Review of patient's allergies indicates:  No Known Allergies  Current Outpatient Prescriptions   Medication Sig    albuterol (PROAIR HFA) 90 mcg/actuation inhaler Inhale 2 puffs into the lungs every 6 (six) hours as needed for Wheezing.    ASPIR-LOW 81 mg EC tablet Take 81 mg by mouth once daily.    busPIRone (BUSPAR) 15 MG tablet Take 1 tablet (15 mg total) by mouth 3 (three) times daily.    chlorhexidine (PERIDEX) 0.12 % solution swish AND SPIT FIFTEEN MILLILITERS BY MOUTH TWICE DAILY    citalopram (CELEXA) 40 MG tablet Take 1 tablet (40 mg total) by mouth once daily.    divalproex (DEPAKOTE) 250 MG EC tablet 1 pill in morning, 1 pill in evening (Patient taking differently: 1 pill in morning, 2 pills  in evening)    fenofibrate 160 MG Tab  Take 160 mg by mouth once daily.    gabapentin (NEURONTIN) 300 MG capsule Take 300 mg by mouth once daily.    hydrOXYzine pamoate (VISTARIL) 25 MG Cap 1 pill 3x a day for 2 days, rest left over every 8 hours as needed for migraine    irbesartan (AVAPRO) 300 MG tablet Take 300 mg by mouth once daily.    isosorbide mononitrate (IMDUR) 60 MG 24 hr tablet Take 60 mg by mouth every morning.    meloxicam (MOBIC) 7.5 MG tablet Take 7.5 mg by mouth 2 (two) times daily.     metoprolol succinate (TOPROL-XL) 25 MG 24 hr tablet Take 25 mg by mouth 2 (two) times daily.     mirabegron (MYRBETRIQ) 25 mg Tb24 ER tablet Take 1 tablet (25 mg total) by mouth once daily.    nitroGLYCERIN (NITROSTAT) 0.4 MG SL tablet Place 0.4 mg under the tongue every 5 (five) minutes as needed for Chest pain.    NON FORMULARY MEDICATION Apply topically 4 (four) times daily as needed. Gabapentin 5%, Orphenadrine 2%, Lidocaine 2%, Prilocaine 2%    oxycodone-acetaminophen (PERCOCET)  mg per tablet Take 1 tablet by mouth every 4 (four) hours as needed for Pain.    pravastatin (PRAVACHOL) 80 MG tablet Take 80 mg by mouth once daily.    RANEXA 1,000 mg Tb12 Take 1,000 mg by mouth 2 (two) times daily.    RESTASIS 0.05 % ophthalmic emulsion Place 1 drop into both eyes 2 (two) times daily.    solifenacin (VESICARE) 10 MG tablet Take 1 tablet (10 mg total) by mouth once daily.    tamsulosin (FLOMAX) 0.4 mg Cp24 Take 1 capsule (0.4 mg total) by mouth once daily.    tizanidine (ZANAFLEX) 4 MG tablet ONE TABLET BY MOUTH IN THE EVENING    trazodone (DESYREL) 150 MG tablet Take 2 tablets (300 mg total) by mouth nightly.    alprazolam (XANAX) 0.5 MG tablet Take 1 tablet (0.5 mg total) by mouth 2 (two) times daily as needed for Anxiety.     No current facility-administered medications for this visit.        Review of Systems   Constitutional: Negative for activity change, fatigue, fever and unexpected weight change.   HENT: Negative for  "congestion.    Eyes: Negative for redness.   Respiratory: Negative for chest tightness and shortness of breath.    Cardiovascular: Negative for chest pain and leg swelling.   Gastrointestinal: Negative for abdominal pain, constipation, diarrhea, nausea and vomiting.   Genitourinary: Negative for dysuria, flank pain, frequency, hematuria, penile pain, penile swelling, scrotal swelling, testicular pain and urgency.   Musculoskeletal: Negative for arthralgias and back pain.   Neurological: Negative for dizziness and light-headedness.   Psychiatric/Behavioral: Negative for behavioral problems and confusion. The patient is not nervous/anxious.    All other systems reviewed and are negative.      Objective:      Vitals:    04/04/17 1017   BP: 130/72   Pulse: 70   Weight: 106.5 kg (234 lb 12.6 oz)   Height: 5' 9" (1.753 m)     Physical Exam   Nursing note and vitals reviewed.  Constitutional: He is oriented to person, place, and time. He appears well-developed and well-nourished.   HENT:   Head: Normocephalic.   Eyes: Conjunctivae are normal.   Neck: Normal range of motion. Neck supple. No tracheal deviation present. No thyromegaly present.   Cardiovascular: Normal rate and normal heart sounds.    Pulmonary/Chest: Effort normal and breath sounds normal. No respiratory distress. He has no wheezes.   Abdominal: Soft. Bowel sounds are normal. There is no hepatosplenomegaly. There is no tenderness. There is no rebound and no CVA tenderness. No hernia.   Genitourinary: Penis normal. Right testis shows tenderness. Left testis shows tenderness. Circumcised.   Genitourinary Comments: Right side more tender.  Swelling noted on right cord.   Musculoskeletal: Normal range of motion. He exhibits no edema or tenderness.   Lymphadenopathy:     He has no cervical adenopathy.   Neurological: He is alert and oriented to person, place, and time.   Skin: Skin is warm and dry. No rash noted. No erythema.     Psychiatric: He has a normal mood " and affect. His behavior is normal. Judgment and thought content normal.       US Scrotum And Testicles   Status: Final result   MyChart Results Release   MyChart Status: Active Results Release   Signed by   Signed Credentials Date/Time    Phone Pager   ANNA MCKENZIE MD 4/03/2017 11:43 [phone]    PACS Images   Show images for US Scrotum And Testicles   Reviewed By Elaina Hendrickson MD on 4/3/2017 11:44 AM   External Result Report   External Result Report   Narrative   Comparison: None.    Results: Scrotal ultrasound performed.  The testicles are symmetric in echogenicity and size.  The right testicle measures 4.6 x 2.9 x 2.6 cm.  The left testicle measures 4.3 x 2.7 x 2.5 cm.  No testicular masses.  The epididymides are unremarkable.  No hydrocele or varicocele.  Note is again made of a right-sided spermatocele measuring 4 x 2.8 x 3.8 cm (4.7 x 3.3 x 3.17 m, previously). Symmetric arterial and venous flow demonstrated to both testicles.   Impression     Stable right-sided spermatocele.  Otherwise unremarkable testicular ultrasound        Electronically signed by: ANNA MCKENZIE MD  Date: 04/03/17  Time: 11:43     Encounter   View Encounter            Assessment:       1. Groin pain, unspecified laterality    2. Orchalgia    3. Benign nodular prostatic hyperplasia without lower urinary tract symptoms          Plan:       1. Groin pain, unspecified laterality  Recurrence of spermatocele  Scrotal Support    - US Scrotum And Testicles; Future    2. Orchalgia  Scrotal support, Ibuprofen     3. Benign nodular prostatic hyperplasia without lower urinary tract symptoms    - Prostate Specific Antigen, Diagnostic; Future            Return in about 3 months (around 7/4/2017) for Follow up, Review X-ray.

## 2017-04-04 NOTE — MR AVS SNAPSHOT
[facility]  [address] [facility] [address]  [place] [address]  Phone: [phone]                  Min Nunez   2017 10:00 AM   Office Visit    Description:  Male : 1960   Provider:  MARIA DE JESUS Hendrickson MD   Department:  [facility]           Reason for Visit     Follow-up           Diagnoses this Visit        Comments    Groin pain, unspecified laterality    -  Primary     Orchalgia         Benign nodular prostatic hyperplasia without lower urinary tract symptoms                To Do List           Goals (5 Years of Data)     None      Follow-Up and Disposition     Return in about 3 months (around 2017) for Follow up, Review X-ray.      [facility]     [facility] Care [facility] -  Assistance  Unless otherwise directed by your provider, please contact [facility], our nurse care line that is available for  assistance.     Registered nurses in the [facility] provide: appointment scheduling, clinical advisement, health education, and other advisory services.  Call: [phone] (toll free)               Medications           Message regarding Medications     Verify the changes and/or additions to your medication regime listed below are the same as discussed with your clinician today.  If any of these changes or additions are incorrect, please notify your healthcare provider.             Verify that the below list of medications is an accurate representation of the medications you are currently taking.  If none reported, the list may be blank. If incorrect, please contact your healthcare provider. Carry this list with you in case of emergency.           Current Medications     albuterol (PROAIR HFA) 90 mcg/actuation inhaler Inhale 2 puffs into the lungs every 6 (six) hours as needed for Wheezing.    ASPIR-LOW 81 mg EC tablet Take 81 mg by mouth once daily.    busPIRone (BUSPAR) 15 MG tablet Take 1 tablet (15 mg total) by mouth 3 (three)  times daily.    chlorhexidine (PERIDEX) 0.12 % solution swish AND SPIT FIFTEEN MILLILITERS BY MOUTH TWICE DAILY    citalopram (CELEXA) 40 MG tablet Take 1 tablet (40 mg total) by mouth once daily.    divalproex (DEPAKOTE) 250 MG EC tablet 1 pill in morning, 1 pill in evening    fenofibrate 160 MG Tab Take 160 mg by mouth once daily.    gabapentin (NEURONTIN) 300 MG capsule Take 300 mg by mouth once daily.    hydrOXYzine pamoate (VISTARIL) 25 MG Cap 1 pill 3x a day for 2 days, rest left over every 8 hours as needed for migraine    irbesartan (AVAPRO) 300 MG tablet Take 300 mg by mouth once daily.    isosorbide mononitrate (IMDUR) 60 MG 24 hr tablet Take 60 mg by mouth every morning.    meloxicam (MOBIC) 7.5 MG tablet Take 7.5 mg by mouth 2 (two) times daily.     metoprolol succinate (TOPROL-XL) 25 MG 24 hr tablet Take 25 mg by mouth 2 (two) times daily.     mirabegron (MYRBETRIQ) 25 mg Tb24 ER tablet Take 1 tablet (25 mg total) by mouth once daily.    nitroGLYCERIN (NITROSTAT) 0.4 MG SL tablet Place 0.4 mg under the tongue every 5 (five) minutes as needed for Chest pain.    NON FORMULARY MEDICATION Apply topically 4 (four) times daily as needed. Gabapentin 5%, Orphenadrine 2%, Lidocaine 2%, Prilocaine 2%    oxycodone-acetaminophen (PERCOCET)  mg per tablet Take 1 tablet by mouth every 4 (four) hours as needed for Pain.    pravastatin (PRAVACHOL) 80 MG tablet Take 80 mg by mouth once daily.    RANEXA 1,000 mg Tb12 Take 1,000 mg by mouth 2 (two) times daily.    RESTASIS 0.05 % ophthalmic emulsion Place 1 drop into both eyes 2 (two) times daily.    solifenacin (VESICARE) 10 MG tablet Take 1 tablet (10 mg total) by mouth once daily.    tamsulosin (FLOMAX) 0.4 mg Cp24 Take 1 capsule (0.4 mg total) by mouth once daily.    tizanidine (ZANAFLEX) 4 MG tablet ONE TABLET BY MOUTH IN THE EVENING    trazodone (DESYREL) 150 MG tablet Take 2 tablets (300 mg total) by mouth nightly.    alprazolam (XANAX) 0.5 MG tablet Take 1  "tablet (0.5 mg total) by mouth 2 (two) times daily as needed for Anxiety.           Clinical Reference Information           Your Vitals Were     BP Pulse Height Weight BMI    130/72 70 5' 9" (1.753 m) 106.5 kg (234 lb 12.6 oz) 34.67 kg/m2      Blood Pressure          Most Recent Value    BP  130/72      Allergies as of 4/4/2017     No Known Allergies      Immunizations Administered on Date of Encounter - 4/4/2017     None      Orders Placed During Today's Visit     Future Labs/Procedures Expected by Expires    Prostate Specific Antigen, Diagnostic  7/2/2017 4/4/2018     Scrotum And Testicles  7/4/2017 4/4/2018      Language Assistance Services     ATTENTION: Language assistance services are available, free of charge. Please call [phone].      ATENCIÓN: Si shikhala martha, tiene a munoz disposición servicios gratuitos de asistencia lingüística. Llame al [phone].     CHÚ Ý: N?u b?n nói Ti?ng Vi?t, có các d?ch v? h? tr? ngôn ng? mi?n phí dành cho b?n. G?i s? [phone].         [facility] complies with applicable Federal civil rights laws and does not discriminate on the basis of race, color, national origin, age, disability, or sex.        "

## 2017-04-11 ENCOUNTER — OFFICE VISIT (OUTPATIENT)
Dept: PSYCHIATRY | Facility: CLINIC | Age: 57
End: 2017-04-11
Payer: MEDICARE

## 2017-04-11 VITALS
WEIGHT: 242 LBS | HEIGHT: 69 IN | HEART RATE: 77 BPM | DIASTOLIC BLOOD PRESSURE: 75 MMHG | BODY MASS INDEX: 35.84 KG/M2 | SYSTOLIC BLOOD PRESSURE: 126 MMHG

## 2017-04-11 DIAGNOSIS — F41.9 ANXIETY: Primary | ICD-10-CM

## 2017-04-11 PROCEDURE — 99214 OFFICE O/P EST MOD 30 MIN: CPT | Mod: S$PBB,,, | Performed by: PSYCHIATRY & NEUROLOGY

## 2017-04-11 PROCEDURE — 99212 OFFICE O/P EST SF 10 MIN: CPT | Mod: PBBFAC | Performed by: PSYCHIATRY & NEUROLOGY

## 2017-04-11 PROCEDURE — 99999 PR PBB SHADOW E&M-EST. PATIENT-LVL II: CPT | Mod: PBBFAC,,, | Performed by: PSYCHIATRY & NEUROLOGY

## 2017-04-11 RX ORDER — CITALOPRAM 40 MG/1
40 TABLET, FILM COATED ORAL DAILY
Qty: 30 TABLET | Refills: 3 | Status: SHIPPED | OUTPATIENT
Start: 2017-04-11 | End: 2017-07-21 | Stop reason: SDUPTHER

## 2017-04-11 RX ORDER — TRAZODONE HYDROCHLORIDE 150 MG/1
300 TABLET ORAL NIGHTLY
Qty: 60 TABLET | Refills: 3 | Status: SHIPPED | OUTPATIENT
Start: 2017-04-11 | End: 2017-07-21 | Stop reason: SDUPTHER

## 2017-04-11 RX ORDER — BUSPIRONE HYDROCHLORIDE 15 MG/1
15 TABLET ORAL 3 TIMES DAILY
Qty: 90 TABLET | Refills: 3 | Status: SHIPPED | OUTPATIENT
Start: 2017-04-11 | End: 2017-07-21 | Stop reason: SDUPTHER

## 2017-04-11 RX ORDER — ALPRAZOLAM 0.5 MG/1
0.5 TABLET ORAL 2 TIMES DAILY PRN
Qty: 30 TABLET | Refills: 2 | Status: SHIPPED | OUTPATIENT
Start: 2017-04-11 | End: 2017-07-21 | Stop reason: SDUPTHER

## 2017-04-11 NOTE — MR AVS SNAPSHOT
[facility]  [address]  [facility] [address]  Phone: [phone]  Fax: [phone]                  Min Nunez   2017 1:30 PM   Office Visit    Description:  Male : 1960   Provider:  Sampson Luke MD   Department:  [facility]           Diagnoses this Visit        Comments    Anxiety    -  Primary            To Do List           Future Appointments        Provider Department Dept Phone    2017 11:30 AM [facility] - ROOM 3 [facility] [phone]    2017 10:30 AM MARIA DE JESUS Hendrickson MD [facility] [phone]      Goals (5 Years of Data)     None       These Medications        Disp Refills Start End    alprazolam (XANAX) 0.5 MG tablet 30 tablet 2 2017    Take 1 tablet (0.5 mg total) by mouth 2 (two) times daily as needed for Anxiety. - Oral    Pharmacy: [facility] - [address] Ph #: [phone]       busPIRone (BUSPAR) 15 MG tablet 90 tablet 3 2017     Take 1 tablet (15 mg total) by mouth 3 (three) times daily. - Oral    Pharmacy: [facility] - [address] Ph #: [phone]       citalopram (CELEXA) 40 MG tablet 30 tablet 3 2017     Take 1 tablet (40 mg total) by mouth once daily. - Oral    Pharmacy: [facility] - [address] Ph #: [phone]       trazodone (DESYREL) 150 MG tablet 60 tablet 3 2017    Take 2 tablets (300 mg total) by mouth nightly. - Oral    Pharmacy: [facility] - [address] Ph #: [phone]         [facility]     [facility] Care [facility] - 24/7 Assistance  Unless otherwise directed by your provider, please contact [facility], our nurse care line that is available for  assistance.     Registered nurses in the [facility] provide: appointment scheduling, clinical  advisement, health education, and other advisory services.  Call: [phone] (toll free)               Medications           Message regarding Medications     Verify the changes and/or additions to your medication regime listed below are the same as discussed with your clinician today.  If any of these changes or additions are incorrect, please notify your healthcare provider.             Verify that the below list of medications is an accurate representation of the medications you are currently taking.  If none reported, the list may be blank. If incorrect, please contact your healthcare provider. Carry this list with you in case of emergency.           Current Medications     albuterol (PROAIR HFA) 90 mcg/actuation inhaler Inhale 2 puffs into the lungs every 6 (six) hours as needed for Wheezing.    alprazolam (XANAX) 0.5 MG tablet Take 1 tablet (0.5 mg total) by mouth 2 (two) times daily as needed for Anxiety.    ASPIR-LOW 81 mg EC tablet Take 81 mg by mouth once daily.    busPIRone (BUSPAR) 15 MG tablet Take 1 tablet (15 mg total) by mouth 3 (three) times daily.    chlorhexidine (PERIDEX) 0.12 % solution swish AND SPIT FIFTEEN MILLILITERS BY MOUTH TWICE DAILY    citalopram (CELEXA) 40 MG tablet Take 1 tablet (40 mg total) by mouth once daily.    divalproex (DEPAKOTE) 250 MG EC tablet 1 pill in morning, 1 pill in evening    fenofibrate 160 MG Tab Take 160 mg by mouth once daily.    gabapentin (NEURONTIN) 300 MG capsule Take 300 mg by mouth once daily.    hydrOXYzine pamoate (VISTARIL) 25 MG Cap 1 pill 3x a day for 2 days, rest left over every 8 hours as needed for migraine    irbesartan (AVAPRO) 300 MG tablet Take 300 mg by mouth once daily.    isosorbide mononitrate (IMDUR) 60 MG 24 hr tablet Take 60 mg by mouth every morning.    meloxicam (MOBIC) 7.5 MG tablet Take 7.5 mg by mouth 2 (two) times daily.     metoprolol succinate (TOPROL-XL) 25 MG 24 hr tablet Take 25 mg by mouth 2 (two) times daily.      "mirabegron (MYRBETRIQ) 25 mg Tb24 ER tablet Take 1 tablet (25 mg total) by mouth once daily.    nitroGLYCERIN (NITROSTAT) 0.4 MG SL tablet Place 0.4 mg under the tongue every 5 (five) minutes as needed for Chest pain.    NON FORMULARY MEDICATION Apply topically 4 (four) times daily as needed. Gabapentin 5%, Orphenadrine 2%, Lidocaine 2%, Prilocaine 2%    oxycodone-acetaminophen (PERCOCET)  mg per tablet Take 1 tablet by mouth every 4 (four) hours as needed for Pain.    pravastatin (PRAVACHOL) 80 MG tablet Take 80 mg by mouth once daily.    RANEXA 1,000 mg Tb12 Take 1,000 mg by mouth 2 (two) times daily.    RESTASIS 0.05 % ophthalmic emulsion Place 1 drop into both eyes 2 (two) times daily.    solifenacin (VESICARE) 10 MG tablet Take 1 tablet (10 mg total) by mouth once daily.    tamsulosin (FLOMAX) 0.4 mg Cp24 Take 1 capsule (0.4 mg total) by mouth once daily.    tizanidine (ZANAFLEX) 4 MG tablet ONE TABLET BY MOUTH IN THE EVENING    trazodone (DESYREL) 150 MG tablet Take 2 tablets (300 mg total) by mouth nightly.           Clinical Reference Information           Your Vitals Were     BP Pulse Height Weight BMI    126/75 77 5' 9" (1.753 m) 109.8 kg (242 lb) 35.74 kg/m2      Blood Pressure          Most Recent Value    BP  126/75      Allergies as of 4/11/2017     No Known Allergies      Immunizations Administered on Date of Encounter - 4/11/2017     None      Language Assistance Services     ATTENTION: Language assistance services are available, free of charge. Please call [phone].      ATENCIÓN: Si habla español, tiene a munoz disposición servicios gratuitos de asistencia lingüística. Llame al [phone].     JEEVAN Ý: N?u b?n nói Ti?ng Vi?t, có các d?ch v? h? tr? ngôn ng? mi?n phí dành cho b?n. G?i s? [phone].         [facility] complies with applicable Federal civil rights laws and does not discriminate on the basis of race, color, national origin, age, disability, or sex.        "

## 2017-04-11 NOTE — PROGRESS NOTES
ESTABLISHED OUTPATIENT VISIT   E/M LEVEL 4: [id number]    ENCOUNTER DATE: 4/11/2017  SITE: [facility], [address]    HISTORY    CHIEF COMPLAINT   Min Nunez is a 56 y.o. male who presents for follow up of anxiety/depression.      HPI   Troubled by physical issues. Activities are limited. Watches TV.    Spiritual beliefs are supporitve.    Used to fish and hunt. [place]ed off [place].    Psychiatric Review Of Systems - Is patient experiencing or having changes in:  sleep: improved with Trazodone  appetite: no  weight: no  energy/anergy: no  interest/pleasure/anhedonia: no  somatic symptoms: no  libido: no  anxiety/panic: anxiety at times  guilty/hopelessness: no  concentration: no  S.I.B.s/risky behavior: no  Irritability: no  Racing thoughts: no  Impulsive behaviors: no  Paranoia:no  AVH:no    Recent alcohol: no  Recent drug: no    Medical ROS   Multiple physical problems     PAST MEDICAL, FAMILY AND SOCIAL HISTORY: The patient's past medical, family and social history have been reviewed and updated as appropriate within the electronic medical record - see encounter notes.    PSYCHOTROPIC MEDICATIONS   Xanax prn, Trazodone 150-300 mg at bedtime, Buspar 15 mg tid, Celexa 40 mg qam, Depakote 250 mg qam, Depakote 500 mg at bedtime[for headaches], Neurontin 300 mg at bedtime[for pain], Hydroxyzine prn for migraines    Scheduled and PRN Medications     Current Outpatient Prescriptions:     albuterol (PROAIR HFA) 90 mcg/actuation inhaler, Inhale 2 puffs into the lungs every 6 (six) hours as needed for Wheezing., Disp: , Rfl:     alprazolam (XANAX) 0.5 MG tablet, Take 1 tablet (0.5 mg total) by mouth 2 (two) times daily as needed for Anxiety., Disp: 30 tablet, Rfl: 2    ASPIR-LOW 81 mg EC tablet, Take 81 mg by mouth once daily., Disp: , Rfl: 3    busPIRone (BUSPAR) 15 MG tablet, Take 1 tablet (15 mg total) by mouth 3 (three) times daily., Disp: 90 tablet, Rfl: 3    chlorhexidine (PERIDEX) 0.12 % solution,  swish AND SPIT FIFTEEN MILLILITERS BY MOUTH TWICE DAILY, Disp: , Rfl: 0    citalopram (CELEXA) 40 MG tablet, Take 1 tablet (40 mg total) by mouth once daily., Disp: 30 tablet, Rfl: 3    divalproex (DEPAKOTE) 250 MG EC tablet, 1 pill in morning, 1 pill in evening (Patient taking differently: 1 pill in morning, 2 pills  in evening), Disp: 60 tablet, Rfl: 6    fenofibrate 160 MG Tab, Take 160 mg by mouth once daily., Disp: , Rfl:     gabapentin (NEURONTIN) 300 MG capsule, Take 300 mg by mouth once daily., Disp: , Rfl:     hydrOXYzine pamoate (VISTARIL) 25 MG Cap, 1 pill 3x a day for 2 days, rest left over every 8 hours as needed for migraine, Disp: 15 capsule, Rfl: 3    irbesartan (AVAPRO) 300 MG tablet, Take 300 mg by mouth once daily., Disp: , Rfl:     isosorbide mononitrate (IMDUR) 60 MG 24 hr tablet, Take 60 mg by mouth every morning., Disp: , Rfl: 3    meloxicam (MOBIC) 7.5 MG tablet, Take 7.5 mg by mouth 2 (two) times daily. , Disp: , Rfl:     metoprolol succinate (TOPROL-XL) 25 MG 24 hr tablet, Take 25 mg by mouth 2 (two) times daily. , Disp: , Rfl:     mirabegron (MYRBETRIQ) 25 mg Tb24 ER tablet, Take 1 tablet (25 mg total) by mouth once daily., Disp: 90 tablet, Rfl: 3    nitroGLYCERIN (NITROSTAT) 0.4 MG SL tablet, Place 0.4 mg under the tongue every 5 (five) minutes as needed for Chest pain., Disp: , Rfl:     NON FORMULARY MEDICATION, Apply topically 4 (four) times daily as needed. Gabapentin 5%, Orphenadrine 2%, Lidocaine 2%, Prilocaine 2%, Disp: , Rfl:     oxycodone-acetaminophen (PERCOCET)  mg per tablet, Take 1 tablet by mouth every 4 (four) hours as needed for Pain., Disp: 30 tablet, Rfl: 0    pravastatin (PRAVACHOL) 80 MG tablet, Take 80 mg by mouth once daily., Disp: , Rfl: 4    RANEXA 1,000 mg Tb12, Take 1,000 mg by mouth 2 (two) times daily., Disp: , Rfl: 3    RESTASIS 0.05 % ophthalmic emulsion, Place 1 drop into both eyes 2 (two) times daily., Disp: , Rfl: 4    solifenacin  (VESICARE) 10 MG tablet, Take 1 tablet (10 mg total) by mouth once daily., Disp: 90 tablet, Rfl: 3    tamsulosin (FLOMAX) 0.4 mg Cp24, Take 1 capsule (0.4 mg total) by mouth once daily., Disp: 90 capsule, Rfl: 3    tizanidine (ZANAFLEX) 4 MG tablet, ONE TABLET BY MOUTH IN THE EVENING, Disp: , Rfl: 2    trazodone (DESYREL) 150 MG tablet, Take 2 tablets (300 mg total) by mouth nightly., Disp: 60 tablet, Rfl: 3    EXAMINATION    Vitals:    04/11/17 1206   BP: 126/75   Pulse: 77     Weight 242 lb's    CONSTITUTIONAL  General Appearance: well nourished    MUSCULOSKELETAL  Muscle Strength and Tone: normal strength and tone  Abnormal Involuntary Movements: no abnormal movement noted  Gait and Station: normal gait    PSYCHIATRIC   Level of Consciousness: alert  Orientation: oriented to person, place and time  Grooming: well groomed  Psychomotor Behavior: no restlessness/agitation  Speech: normal in rate, rhythm and volume  Language: normal vocabulary  Mood: anxious at times  Affect: full range and appropriate  Thought Process: logical and goal directed  Associations: intact associations  Thought Content: no SI/HI  Memory: grossly intact  Attention: intact to content of interview  Fund of Knowledge: appears adequate  Insight: good  Judgement: good    MEDICAL DECISION MAKING    DIAGNOSES  Anxiety d/o, unspecified    PROBLEM LIST AND MANAGEMENT PLANS    - anxiety:   continue Xanax prn  Continue Celexa, Buspar and Trazodone as above for now  - rtc 3 months     Time with patient: 20 min    LABORATORY DATA  No visits with results within 3 Month(s) from this visit.  Latest known visit with results is:    Admission on 11/08/2016, Discharged on 11/08/2016   Component Date Value Ref Range Status    Specimen UA 11/08/2016 Urine, Clean Catch   Final    Color, UA 11/08/2016 Yellow  Yellow, Straw, Leonila Final    Appearance, UA 11/08/2016 Clear  Clear Final    pH, UA 11/08/2016 8.0  5.0 - 8.0 Final    Specific Gravity, UA  11/08/2016 1.020  1.005 - 1.030 Final    Protein, UA 11/08/2016 Trace* Negative Final    Comment: Recommend a 24 hour urine protein or a urine   protein/creatinine ratio if globulin induced proteinuria is  clinically suspected.      Glucose, UA 11/08/2016 Negative  Negative Final    Ketones, UA 11/08/2016 Negative  Negative Final    Bilirubin (UA) 11/08/2016 Negative  Negative Final    Occult Blood UA 11/08/2016 Negative  Negative Final    Nitrite, UA 11/08/2016 Negative  Negative Final    Urobilinogen, UA 11/08/2016 Negative  <2.0 EU/dL Final    Leukocytes, UA 11/08/2016 Negative  Negative Final    WBC 11/08/2016 5.33  3.90 - 12.70 K/uL Final    RBC 11/08/2016 4.90  4.60 - 6.20 M/uL Final    Hemoglobin 11/08/2016 14.6  14.0 - 18.0 g/dL Final    Hematocrit 11/08/2016 42.6  40.0 - 54.0 % Final    MCV 11/08/2016 87  82 - 98 fL Final    MCH 11/08/2016 29.8  27.0 - 31.0 pg Final    MCHC 11/08/2016 34.3  32.0 - 36.0 % Final    RDW 11/08/2016 14.4  11.5 - 14.5 % Final    Platelets 11/08/2016 157  150 - 350 K/uL Final    MPV 11/08/2016 11.6  9.2 - 12.9 fL Final    Gran # 11/08/2016 2.2  1.8 - 7.7 K/uL Final    Lymph # 11/08/2016 2.3  1.0 - 4.8 K/uL Final    Mono # 11/08/2016 0.7  0.3 - 1.0 K/uL Final    Eos # 11/08/2016 0.1  0.0 - 0.5 K/uL Final    Baso # 11/08/2016 0.04  0.00 - 0.20 K/uL Final    Gran% 11/08/2016 41.0  38.0 - 73.0 % Final    Lymph% 11/08/2016 43.9  18.0 - 48.0 % Final    Mono% 11/08/2016 12.8  4.0 - 15.0 % Final    Eosinophil% 11/08/2016 1.1  0.0 - 8.0 % Final    Basophil% 11/08/2016 0.8  0.0 - 1.9 % Final    Differential Method 11/08/2016 Automated   Final    Lipase 11/08/2016 35  4 - 60 U/L Final    Sodium 11/08/2016 143  136 - 145 mmol/L Final    Potassium 11/08/2016 4.2  3.5 - 5.1 mmol/L Final    Chloride 11/08/2016 108  95 - 110 mmol/L Final    CO2 11/08/2016 27  23 - 29 mmol/L Final    Glucose 11/08/2016 103  70 - 110 mg/dL Final    BUN, Bld 11/08/2016 12  6 - 20  mg/dL Final    Creatinine 11/08/2016 0.9  0.5 - 1.4 mg/dL Final    Calcium 11/08/2016 8.7  8.7 - 10.5 mg/dL Final    Total Protein 11/08/2016 7.6  6.0 - 8.4 g/dL Final    Albumin 11/08/2016 3.9  3.5 - 5.2 g/dL Final    Total Bilirubin 11/08/2016 0.4  0.1 - 1.0 mg/dL Final    Comment: For infants and newborns, interpretation of results should be based  on gestational age, weight and in agreement with clinical  observations.  Premature Infant recommended reference ranges:  Up to 24 hours.............<8.0 mg/dL  Up to 48 hours............<12.0 mg/dL  3-5 days..................<15.0 mg/dL  6-29 days.................<15.0 mg/dL      Alkaline Phosphatase 11/08/2016 57  55 - 135 U/L Final    AST 11/08/2016 24  10 - 40 U/L Final    ALT 11/08/2016 39  10 - 44 U/L Final    Anion Gap 11/08/2016 8  8 - 16 mmol/L Final    eGFR if African American 11/08/2016 >60.0  >60 mL/min/1.73 m^2 Final    eGFR if non African American 11/08/2016 >60.0  >60 mL/min/1.73 m^2 Final    Comment: Calculation used to obtain the estimated glomerular filtration  rate (eGFR) is the CKD-EPI equation. Since race is unknown   in our information system, the eGFR values for   -American and Non--American patients are given   for each creatinine result.             Sampson Luke

## 2017-05-04 DIAGNOSIS — N40.0 BPH WITHOUT URINARY OBSTRUCTION: ICD-10-CM

## 2017-05-04 RX ORDER — TAMSULOSIN HYDROCHLORIDE 0.4 MG/1
0.4 CAPSULE ORAL DAILY
Qty: 90 CAPSULE | Refills: 3 | Status: SHIPPED | OUTPATIENT
Start: 2017-05-04 | End: 2017-07-18 | Stop reason: SDUPTHER

## 2017-05-04 NOTE — TELEPHONE ENCOUNTER
----- Message from Laura Burns sent at 5/4/2017 10:38 AM CDT -----  Contact: self  Pt is requesting a refill for tamsulosin (FLOMAX) 0.4 mg Cp24. Please call pt to confirm  [facility]        Thanks

## 2017-07-12 ENCOUNTER — HOSPITAL ENCOUNTER (OUTPATIENT)
Dept: RADIOLOGY | Facility: HOSPITAL | Age: 57
Discharge: HOME | End: 2017-07-12
Attending: UROLOGY
Payer: MEDICARE

## 2017-07-12 DIAGNOSIS — R10.30 GROIN PAIN, UNSPECIFIED LATERALITY: ICD-10-CM

## 2017-07-12 PROCEDURE — 76870 US EXAM SCROTUM: CPT | Mod: TC

## 2017-07-12 PROCEDURE — 76870 US EXAM SCROTUM: CPT | Mod: 26,,, | Performed by: RADIOLOGY

## 2017-07-18 ENCOUNTER — OFFICE VISIT (OUTPATIENT)
Dept: UROLOGY | Facility: CLINIC | Age: 57
End: 2017-07-18
Payer: MEDICARE

## 2017-07-18 ENCOUNTER — LAB VISIT (OUTPATIENT)
Dept: LAB | Facility: HOSPITAL | Age: 57
End: 2017-07-18
Attending: UROLOGY
Payer: MEDICARE

## 2017-07-18 VITALS
SYSTOLIC BLOOD PRESSURE: 100 MMHG | HEART RATE: 60 BPM | DIASTOLIC BLOOD PRESSURE: 70 MMHG | BODY MASS INDEX: 34.71 KG/M2 | WEIGHT: 234.38 LBS | HEIGHT: 69 IN

## 2017-07-18 DIAGNOSIS — R35.0 URINARY FREQUENCY: ICD-10-CM

## 2017-07-18 DIAGNOSIS — N40.0 BPH WITHOUT URINARY OBSTRUCTION: ICD-10-CM

## 2017-07-18 DIAGNOSIS — N40.0 BENIGN NODULAR PROSTATIC HYPERPLASIA WITHOUT LOWER URINARY TRACT SYMPTOMS: ICD-10-CM

## 2017-07-18 DIAGNOSIS — N50.819 ORCHALGIA: Primary | ICD-10-CM

## 2017-07-18 DIAGNOSIS — N43.40 SPERMATOCELE: ICD-10-CM

## 2017-07-18 LAB — COMPLEXED PSA SERPL-MCNC: 0.44 NG/ML

## 2017-07-18 PROCEDURE — 99214 OFFICE O/P EST MOD 30 MIN: CPT | Mod: S$PBB,,, | Performed by: UROLOGY

## 2017-07-18 PROCEDURE — 99999 PR PBB SHADOW E&M-EST. PATIENT-LVL III: CPT | Mod: PBBFAC,,, | Performed by: UROLOGY

## 2017-07-18 PROCEDURE — 99213 OFFICE O/P EST LOW 20 MIN: CPT | Mod: PBBFAC | Performed by: UROLOGY

## 2017-07-18 RX ORDER — RIVAROXABAN 15 MG/1
TABLET, FILM COATED ORAL
Refills: 0 | COMMUNITY
Start: 2017-05-17

## 2017-07-18 RX ORDER — TAMSULOSIN HYDROCHLORIDE 0.4 MG/1
0.4 CAPSULE ORAL DAILY
Qty: 90 CAPSULE | Refills: 3 | Status: SHIPPED | OUTPATIENT
Start: 2017-07-18 | End: 2018-02-21 | Stop reason: SDUPTHER

## 2017-07-18 RX ORDER — SOLIFENACIN SUCCINATE 10 MG/1
10 TABLET, FILM COATED ORAL DAILY
Qty: 90 TABLET | Refills: 3 | Status: SHIPPED | OUTPATIENT
Start: 2017-07-18 | End: 2018-02-21 | Stop reason: SDUPTHER

## 2017-07-18 NOTE — PROGRESS NOTES
Subjective:       Patient ID: Min Nunez is a 56 y.o. male who was last seen in this office 4/4/2017    Chief Complaint:   Chief Complaint   Patient presents with    Groin Pain     3 month f/u with ultrasound pt had psa today        Orchalgia  He had a bilateral epididymectomy on 8/5/2016.  He continues to have right sided swelling and bilateral pain.  His right sided pain is worse than left.   He complains of pain with sitting, standing, getting up to stand, after roly movements, lifting, and walking.  He has difficulty walking or performing exercises.  He is concerned that the spermatocele is getting larger.    Urinary frequency and Urgency Incontinence  He has some frequency and urgency that is now improving with Ditropan XL 10 mg and Myrbetriq.      ACTIVE MEDICAL ISSUES:  Patient Active Problem List   Diagnosis    Chest pain, exertional    Coronary artery disease, occlusive    Coronary atherosclerosis of unspecified type of vessel, native or graft    Orchalgia    Dizziness    Syncope    Abnormal brain MRI    Migraines    Atypical migraine    HA (headache)    Numbness    Facial droop    Faintness    Renal stone    Groin pain    Constipation    Hypotension       ALLERGIES AND MEDICATIONS: updated and reviewed.  Review of patient's allergies indicates:  No Known Allergies  Current Outpatient Prescriptions   Medication Sig    albuterol (PROAIR HFA) 90 mcg/actuation inhaler Inhale 2 puffs into the lungs every 6 (six) hours as needed for Wheezing.    ASPIR-LOW 81 mg EC tablet Take 81 mg by mouth once daily.    busPIRone (BUSPAR) 15 MG tablet Take 1 tablet (15 mg total) by mouth 3 (three) times daily.    chlorhexidine (PERIDEX) 0.12 % solution swish AND SPIT FIFTEEN MILLILITERS BY MOUTH TWICE DAILY    citalopram (CELEXA) 40 MG tablet Take 1 tablet (40 mg total) by mouth once daily.    divalproex (DEPAKOTE) 250 MG EC tablet 1 pill in morning, 1 pill in evening (Patient taking differently: 1  pill in morning, 2 pills  in evening)    fenofibrate 160 MG Tab Take 160 mg by mouth once daily.    gabapentin (NEURONTIN) 300 MG capsule Take 300 mg by mouth once daily.    hydrOXYzine pamoate (VISTARIL) 25 MG Cap 1 pill 3x a day for 2 days, rest left over every 8 hours as needed for migraine    irbesartan (AVAPRO) 300 MG tablet Take 300 mg by mouth once daily.    isosorbide mononitrate (IMDUR) 60 MG 24 hr tablet Take 60 mg by mouth every morning.    meloxicam (MOBIC) 7.5 MG tablet Take 7.5 mg by mouth 2 (two) times daily.     metoprolol succinate (TOPROL-XL) 25 MG 24 hr tablet Take 25 mg by mouth 2 (two) times daily.     mirabegron (MYRBETRIQ) 25 mg Tb24 ER tablet Take 1 tablet (25 mg total) by mouth once daily.    nitroGLYCERIN (NITROSTAT) 0.4 MG SL tablet Place 0.4 mg under the tongue every 5 (five) minutes as needed for Chest pain.    NON FORMULARY MEDICATION Apply topically 4 (four) times daily as needed. Gabapentin 5%, Orphenadrine 2%, Lidocaine 2%, Prilocaine 2%    oxycodone-acetaminophen (PERCOCET)  mg per tablet Take 1 tablet by mouth every 4 (four) hours as needed for Pain.    pravastatin (PRAVACHOL) 80 MG tablet Take 80 mg by mouth once daily.    RANEXA 1,000 mg Tb12 Take 1,000 mg by mouth 2 (two) times daily.    RESTASIS 0.05 % ophthalmic emulsion Place 1 drop into both eyes 2 (two) times daily.    solifenacin (VESICARE) 10 MG tablet Take 1 tablet (10 mg total) by mouth once daily.    tamsulosin (FLOMAX) 0.4 mg Cp24 Take 1 capsule (0.4 mg total) by mouth once daily.    tizanidine (ZANAFLEX) 4 MG tablet ONE TABLET BY MOUTH IN THE EVENING    trazodone (DESYREL) 150 MG tablet Take 2 tablets (300 mg total) by mouth nightly.    XARELTO 15 mg Tab ONE TABLET BY MOUTH TWICE DAILY FOR 21 DAYS    alprazolam (XANAX) 0.5 MG tablet Take 1 tablet (0.5 mg total) by mouth 2 (two) times daily as needed for Anxiety.     No current facility-administered medications for this visit.        Review  "of Systems   Constitutional: Negative for activity change, fatigue, fever and unexpected weight change.   HENT: Negative for congestion.    Eyes: Negative for redness.   Respiratory: Negative for chest tightness and shortness of breath.    Cardiovascular: Negative for chest pain and leg swelling.   Gastrointestinal: Negative for abdominal pain, constipation, diarrhea, nausea and vomiting.   Genitourinary: Negative for dysuria, flank pain, frequency, hematuria, penile pain, penile swelling, scrotal swelling, testicular pain and urgency.   Musculoskeletal: Negative for arthralgias and back pain.   Neurological: Negative for dizziness and light-headedness.   Psychiatric/Behavioral: Negative for behavioral problems and confusion. The patient is not nervous/anxious.    All other systems reviewed and are negative.      Objective:      Vitals:    07/18/17 1055   BP: 100/70   Pulse: 60   Weight: 106.3 kg (234 lb 5.6 oz)   Height: 5' 9" (1.753 m)     Physical Exam   Nursing note and vitals reviewed.  Constitutional: He is oriented to person, place, and time. He appears well-developed and well-nourished.   HENT:   Head: Normocephalic.   Eyes: Conjunctivae are normal.   Neck: Normal range of motion. Neck supple. No tracheal deviation present. No thyromegaly present.   Cardiovascular: Normal rate and normal heart sounds.    Pulmonary/Chest: Effort normal and breath sounds normal. No respiratory distress. He has no wheezes.   Abdominal: Soft. Bowel sounds are normal. There is no hepatosplenomegaly. There is no tenderness. There is no rebound and no CVA tenderness. No hernia.   Genitourinary: Penis normal. Right testis shows tenderness. Left testis shows tenderness.   Genitourinary Comments: Right spermatocele palpable, tender.  No erythema or fluctuance.  Left testicle tender laterally, no palpable abnormality.   Musculoskeletal: Normal range of motion. He exhibits no edema or tenderness.   Lymphadenopathy:     He has no " cervical adenopathy.   Neurological: He is alert and oriented to person, place, and time.   Skin: Skin is warm and dry. No rash noted. No erythema.     Psychiatric: He has a normal mood and affect. His behavior is normal. Judgment and thought content normal.       Urine dipstick shows negative for all components.  Micro exam: negative for WBC's or RBC's.    PSA pending  US Scrotum And Testicles   Status: Final result   MyChart Results Release     MyChart Status: Active Results Release   Signed by     Signed Credentials Date/Time  Phone Pager   CONCHIS ROBERSON MD 7/12/2017 12:32 [phone]   PACS Images     Show images for US Scrotum And Testicles   Reviewed By Elaina Hendrickson MD on 7/13/2017 08:16   External Result Report     External Result Report   Narrative     Testicular ultrasound    Comparison: None    Results:   Duplex scan was performed using B-mode/grayscale imaging and Doppler spectral analysis and color flow.    The right testis measures 4.6 x 2.5 x 3.0 cm and demonstrates normal homogeneous echogenicity.  No testicular mass.  There is normal flow to the right testis.  The epididymis appears normal.  There is a spermatocele measuring 3.7 x 4.8 x 3.9 cm, unchanged.  No hydrocele or varicocele.    Left testis measures 4.5 x 2.4 x 2.9 cm and demonstrates normal homogeneous echogenicity.  There is a trace of fluid adjacent to the testis which in retrospect is unchanged from the prior study. No testicular mass.  There is normal flow to the left testis.  The epididymis appears normal.  No hydrocele or varicocele.   Impression         Right-sided spermatocele, unchanged.  Normal appearance of the bilateral testis .      Electronically signed by: CONCHIS ROBERSON MD  Date: 07/12/17  Time: 12:32     Encounter     View Encounter                Assessment:       1. Orchalgia    2. Spermatocele    3. Urinary frequency    4. BPH without urinary obstruction          Plan:       1.  Orchalgia  Unchanged.  He is interested in possible repeat surgery.  I do not feel that he has a good chance for success with another surgery.  I reiterated the rule of thirds when performing surgery for pain: 1/3 get better, 1/3 stay the same, 1/3 get worse.    Repeat exam in 3 months, hold on repeat US.    2. Spermatocele  Stable, no growing since surgery.    3. Urinary frequency    - mirabegron (MYRBETRIQ) 25 mg Tb24 ER tablet; Take 1 tablet (25 mg total) by mouth once daily.  Dispense: 90 tablet; Refill: 3  - solifenacin (VESICARE) 10 MG tablet; Take 1 tablet (10 mg total) by mouth once daily.  Dispense: 90 tablet; Refill: 3    4. BPH without urinary obstruction    - tamsulosin (FLOMAX) 0.4 mg Cp24; Take 1 capsule (0.4 mg total) by mouth once daily.  Dispense: 90 capsule; Refill: 3              Return in about 3 months (around 10/18/2017) for Follow up.

## 2017-07-19 ENCOUNTER — DOCUMENTATION ONLY (OUTPATIENT)
Dept: UROLOGY | Facility: CLINIC | Age: 57
End: 2017-07-19

## 2017-07-21 ENCOUNTER — OFFICE VISIT (OUTPATIENT)
Dept: PSYCHIATRY | Facility: CLINIC | Age: 57
End: 2017-07-21
Payer: MEDICARE

## 2017-07-21 VITALS
HEIGHT: 69 IN | WEIGHT: 237 LBS | DIASTOLIC BLOOD PRESSURE: 66 MMHG | SYSTOLIC BLOOD PRESSURE: 136 MMHG | HEART RATE: 80 BPM | BODY MASS INDEX: 35.1 KG/M2

## 2017-07-21 DIAGNOSIS — F41.9 ANXIETY: Primary | ICD-10-CM

## 2017-07-21 PROCEDURE — 99999 PR PBB SHADOW E&M-EST. PATIENT-LVL II: CPT | Mod: PBBFAC,,, | Performed by: PSYCHIATRY & NEUROLOGY

## 2017-07-21 PROCEDURE — 99213 OFFICE O/P EST LOW 20 MIN: CPT | Mod: S$PBB,,, | Performed by: PSYCHIATRY & NEUROLOGY

## 2017-07-21 PROCEDURE — 99212 OFFICE O/P EST SF 10 MIN: CPT | Mod: PBBFAC | Performed by: PSYCHIATRY & NEUROLOGY

## 2017-07-21 RX ORDER — BUSPIRONE HYDROCHLORIDE 15 MG/1
15 TABLET ORAL 3 TIMES DAILY
Qty: 90 TABLET | Refills: 3 | Status: SHIPPED | OUTPATIENT
Start: 2017-07-21 | End: 2017-10-19 | Stop reason: SDUPTHER

## 2017-07-21 RX ORDER — TRAZODONE HYDROCHLORIDE 150 MG/1
300 TABLET ORAL NIGHTLY
Qty: 60 TABLET | Refills: 3 | Status: SHIPPED | OUTPATIENT
Start: 2017-07-21 | End: 2017-10-19 | Stop reason: SDUPTHER

## 2017-07-21 RX ORDER — ALPRAZOLAM 0.5 MG/1
0.5 TABLET ORAL 2 TIMES DAILY PRN
Qty: 30 TABLET | Refills: 2 | Status: SHIPPED | OUTPATIENT
Start: 2017-07-21 | End: 2017-10-19 | Stop reason: SDUPTHER

## 2017-07-21 RX ORDER — CITALOPRAM 40 MG/1
40 TABLET, FILM COATED ORAL DAILY
Qty: 30 TABLET | Refills: 3 | Status: SHIPPED | OUTPATIENT
Start: 2017-07-21 | End: 2017-10-19 | Stop reason: SDUPTHER

## 2017-07-21 NOTE — PROGRESS NOTES
ESTABLISHED OUTPATIENT VISIT   E/M LEVEL 4: [id number]    ENCOUNTER DATE: 7/21/2017  SITE: [facility], [address]    HISTORY    CHIEF COMPLAINT   Min Nunez is a 56 y.o. male who presents for follow up of anxiety/depression.      HPI   Underwent neck surgery[at [facility]] a month ago, reports that he also suffered an MI a month ago.    Remains anxious at times, struggles with multiple physical problems. Walking is limited, as are other activities.    Spiritual beliefs remain supportive.    Psychiatric Review Of Systems - Is patient experiencing or having changes in:  sleep: adequate with Trazodone  appetite: no  weight: has lost 5 lb's since previous visit  energy/anergy: no  interest/pleasure/anhedonia: no  somatic symptoms: no  libido: no  anxiety/panic: anxiety at times  guilty/hopelessness: no  concentration: no  S.I.B.s/risky behavior: no  Irritability: no  Racing thoughts: no  Impulsive behaviors: no  Paranoia:no  AVH:no    Recent alcohol: no  Recent drug: no    Medical ROS   Multiple physical problems     PAST MEDICAL, FAMILY AND SOCIAL HISTORY: The patient's past medical, family and social history have been reviewed and updated as appropriate within the electronic medical record - see encounter notes.    PSYCHOTROPIC MEDICATIONS   Xanax prn, Trazodone 150-300 mg at bedtime, Buspar 15 mg tid, Celexa 40 mg qam, Depakote 250 mg qam, Depakote 500 mg at bedtime[for headaches], Neurontin 300 mg at bedtime[for pain], Hydroxyzine prn for migraines    Scheduled and PRN Medications     Current Outpatient Prescriptions:     albuterol (PROAIR HFA) 90 mcg/actuation inhaler, Inhale 2 puffs into the lungs every 6 (six) hours as needed for Wheezing., Disp: , Rfl:     alprazolam (XANAX) 0.5 MG tablet, Take 1 tablet (0.5 mg total) by mouth 2 (two) times daily as needed for Anxiety., Disp: 30 tablet, Rfl: 2    ASPIR-LOW 81 mg EC tablet, Take 81 mg by mouth once daily., Disp: , Rfl: 3    busPIRone  (BUSPAR) 15 MG tablet, Take 1 tablet (15 mg total) by mouth 3 (three) times daily., Disp: 90 tablet, Rfl: 3    chlorhexidine (PERIDEX) 0.12 % solution, swish AND SPIT FIFTEEN MILLILITERS BY MOUTH TWICE DAILY, Disp: , Rfl: 0    citalopram (CELEXA) 40 MG tablet, Take 1 tablet (40 mg total) by mouth once daily., Disp: 30 tablet, Rfl: 3    divalproex (DEPAKOTE) 250 MG EC tablet, 1 pill in morning, 1 pill in evening (Patient taking differently: 1 pill in morning, 2 pills  in evening), Disp: 60 tablet, Rfl: 6    fenofibrate 160 MG Tab, Take 160 mg by mouth once daily., Disp: , Rfl:     gabapentin (NEURONTIN) 300 MG capsule, Take 300 mg by mouth once daily., Disp: , Rfl:     hydrOXYzine pamoate (VISTARIL) 25 MG Cap, 1 pill 3x a day for 2 days, rest left over every 8 hours as needed for migraine, Disp: 15 capsule, Rfl: 3    irbesartan (AVAPRO) 300 MG tablet, Take 300 mg by mouth once daily., Disp: , Rfl:     isosorbide mononitrate (IMDUR) 60 MG 24 hr tablet, Take 60 mg by mouth every morning., Disp: , Rfl: 3    meloxicam (MOBIC) 7.5 MG tablet, Take 7.5 mg by mouth 2 (two) times daily. , Disp: , Rfl:     metoprolol succinate (TOPROL-XL) 25 MG 24 hr tablet, Take 25 mg by mouth 2 (two) times daily. , Disp: , Rfl:     mirabegron (MYRBETRIQ) 25 mg Tb24 ER tablet, Take 1 tablet (25 mg total) by mouth once daily., Disp: 90 tablet, Rfl: 3    nitroGLYCERIN (NITROSTAT) 0.4 MG SL tablet, Place 0.4 mg under the tongue every 5 (five) minutes as needed for Chest pain., Disp: , Rfl:     NON FORMULARY MEDICATION, Apply topically 4 (four) times daily as needed. Gabapentin 5%, Orphenadrine 2%, Lidocaine 2%, Prilocaine 2%, Disp: , Rfl:     oxycodone-acetaminophen (PERCOCET)  mg per tablet, Take 1 tablet by mouth every 4 (four) hours as needed for Pain., Disp: 30 tablet, Rfl: 0    pravastatin (PRAVACHOL) 80 MG tablet, Take 80 mg by mouth once daily., Disp: , Rfl: 4    RANEXA 1,000 mg Tb12, Take 1,000 mg by mouth 2 (two)  times daily., Disp: , Rfl: 3    RESTASIS 0.05 % ophthalmic emulsion, Place 1 drop into both eyes 2 (two) times daily., Disp: , Rfl: 4    solifenacin (VESICARE) 10 MG tablet, Take 1 tablet (10 mg total) by mouth once daily., Disp: 90 tablet, Rfl: 3    tamsulosin (FLOMAX) 0.4 mg Cp24, Take 1 capsule (0.4 mg total) by mouth once daily., Disp: 90 capsule, Rfl: 3    tizanidine (ZANAFLEX) 4 MG tablet, ONE TABLET BY MOUTH IN THE EVENING, Disp: , Rfl: 2    trazodone (DESYREL) 150 MG tablet, Take 2 tablets (300 mg total) by mouth nightly., Disp: 60 tablet, Rfl: 3    XARELTO 15 mg Tab, ONE TABLET BY MOUTH TWICE DAILY FOR 21 DAYS, Disp: , Rfl: 0    EXAMINATION    Vitals:    07/21/17 1252   BP: 136/66   Pulse: 80     Weight 237 lb's    CONSTITUTIONAL  General Appearance: well nourished    MUSCULOSKELETAL  Muscle Strength and Tone: normal strength and tone  Abnormal Involuntary Movements: no abnormal movement noted  Gait and Station: normal gait    PSYCHIATRIC   Level of Consciousness: alert  Orientation: oriented to person, place and time  Grooming: well groomed  Psychomotor Behavior: no restlessness/agitation  Speech: normal in rate, rhythm and volume  Language: normal vocabulary  Mood: anxious at times  Affect: full range and appropriate  Thought Process: logical and goal directed  Associations: intact associations  Thought Content: no SI/HI  Memory: grossly intact  Attention: intact to content of interview  Fund of Knowledge: appears adequate  Insight: good  Judgement: good    MEDICAL DECISION MAKING    DIAGNOSES  Anxiety d/o, unspecified    PROBLEM LIST AND MANAGEMENT PLANS    - anxiety:   continue Xanax 0.5 mg prn[30 tabs for 30 days]  Continue Celexa, Buspar and Trazodone as above for now  - rtc 3 months     Time with patient: 20 min    LABORATORY DATA  Lab Visit on 07/18/2017   Component Date Value Ref Range Status    PSA DIAGNOSTIC 07/18/2017 0.44  0.00 - 4.00 ng/mL Final    Comment: PSA Expected  levels:  Hormonal Therapy: <0.05 ng/ml  Prostatectomy: <0.01 ng/ml  Radiation Therapy: <1.00 ng/ml             Sampson Luke

## 2017-10-12 ENCOUNTER — OFFICE VISIT (OUTPATIENT)
Dept: UROLOGY | Facility: CLINIC | Age: 57
End: 2017-10-12
Payer: MEDICARE

## 2017-10-12 VITALS
HEIGHT: 69 IN | WEIGHT: 240.06 LBS | DIASTOLIC BLOOD PRESSURE: 70 MMHG | HEART RATE: 62 BPM | SYSTOLIC BLOOD PRESSURE: 130 MMHG | BODY MASS INDEX: 35.56 KG/M2

## 2017-10-12 DIAGNOSIS — R35.0 URINARY FREQUENCY: ICD-10-CM

## 2017-10-12 DIAGNOSIS — N50.819 ORCHALGIA: Primary | ICD-10-CM

## 2017-10-12 DIAGNOSIS — R10.2 PELVIC PAIN IN MALE: ICD-10-CM

## 2017-10-12 DIAGNOSIS — N40.0 BPH WITHOUT OBSTRUCTION/LOWER URINARY TRACT SYMPTOMS: ICD-10-CM

## 2017-10-12 LAB
BILIRUB SERPL-MCNC: NORMAL MG/DL
BLOOD URINE, POC: NORMAL
COLOR, POC UA: YELLOW
GLUCOSE UR QL STRIP: NORMAL
KETONES UR QL STRIP: NORMAL
LEUKOCYTE ESTERASE URINE, POC: NORMAL
NITRITE, POC UA: NORMAL
PH, POC UA: 5
PROTEIN, POC: NORMAL
SPECIFIC GRAVITY, POC UA: 1000
UROBILINOGEN, POC UA: NORMAL

## 2017-10-12 PROCEDURE — 99999 PR PBB SHADOW E&M-EST. PATIENT-LVL III: CPT | Mod: PBBFAC,,, | Performed by: UROLOGY

## 2017-10-12 PROCEDURE — 99214 OFFICE O/P EST MOD 30 MIN: CPT | Mod: S$PBB,,, | Performed by: UROLOGY

## 2017-10-12 PROCEDURE — 81001 URINALYSIS AUTO W/SCOPE: CPT | Mod: PBBFAC | Performed by: UROLOGY

## 2017-10-12 PROCEDURE — 99213 OFFICE O/P EST LOW 20 MIN: CPT | Mod: PBBFAC | Performed by: UROLOGY

## 2017-10-12 NOTE — PROGRESS NOTES
Subjective:       Patient ID: Min Nunez is a 56 y.o. male who was last seen in this office 7/18/2017    Chief Complaint:   Chief Complaint   Patient presents with    Groin Pain     3 month f/u        Orchalgia  He had a bilateral epididymectomy on 8/5/2016.  He continues to have right sided swelling and bilateral pain.  His right sided pain is worse than left.   He complains of pain with sitting, standing, getting up to stand, after roly movements, lifting, and walking.  He has difficulty walking or performing exercises.     He is now having intermittent penile and pelvic pain as well.  It happens randomly and is not necessarily associated with voiding or any activity.    Urinary frequency and Urgency Incontinence  He has some frequency and urgency that is now improving with Ditropan XL 10 mg and Myrbetriq.    ACTIVE MEDICAL ISSUES:  Patient Active Problem List   Diagnosis    Chest pain, exertional    Coronary artery disease, occlusive    Coronary atherosclerosis of unspecified type of vessel, native or graft    Orchalgia    Dizziness    Syncope    Abnormal brain MRI    Migraines    Atypical migraine    HA (headache)    Numbness    Facial droop    Faintness    Renal stone    Groin pain    Constipation    Hypotension       ALLERGIES AND MEDICATIONS: updated and reviewed.  Review of patient's allergies indicates:  No Known Allergies  Current Outpatient Prescriptions   Medication Sig    albuterol (PROAIR HFA) 90 mcg/actuation inhaler Inhale 2 puffs into the lungs every 6 (six) hours as needed for Wheezing.    ASPIR-LOW 81 mg EC tablet Take 81 mg by mouth once daily.    busPIRone (BUSPAR) 15 MG tablet Take 1 tablet (15 mg total) by mouth 3 (three) times daily.    chlorhexidine (PERIDEX) 0.12 % solution swish AND SPIT FIFTEEN MILLILITERS BY MOUTH TWICE DAILY    citalopram (CELEXA) 40 MG tablet Take 1 tablet (40 mg total) by mouth once daily.    divalproex (DEPAKOTE) 250 MG EC tablet 1 pill  in morning, 1 pill in evening (Patient taking differently: 1 pill in morning, 2 pills  in evening)    fenofibrate 160 MG Tab Take 160 mg by mouth once daily.    gabapentin (NEURONTIN) 300 MG capsule Take 300 mg by mouth once daily.    hydrOXYzine pamoate (VISTARIL) 25 MG Cap 1 pill 3x a day for 2 days, rest left over every 8 hours as needed for migraine    irbesartan (AVAPRO) 300 MG tablet Take 300 mg by mouth once daily.    isosorbide mononitrate (IMDUR) 60 MG 24 hr tablet Take 60 mg by mouth every morning.    meloxicam (MOBIC) 7.5 MG tablet Take 7.5 mg by mouth 2 (two) times daily.     metoprolol succinate (TOPROL-XL) 25 MG 24 hr tablet Take 25 mg by mouth 2 (two) times daily.     mirabegron (MYRBETRIQ) 25 mg Tb24 ER tablet Take 1 tablet (25 mg total) by mouth once daily.    nitroGLYCERIN (NITROSTAT) 0.4 MG SL tablet Place 0.4 mg under the tongue every 5 (five) minutes as needed for Chest pain.    NON FORMULARY MEDICATION Apply topically 4 (four) times daily as needed. Gabapentin 5%, Orphenadrine 2%, Lidocaine 2%, Prilocaine 2%    oxycodone-acetaminophen (PERCOCET)  mg per tablet Take 1 tablet by mouth every 4 (four) hours as needed for Pain.    pravastatin (PRAVACHOL) 80 MG tablet Take 80 mg by mouth once daily.    RANEXA 1,000 mg Tb12 Take 1,000 mg by mouth 2 (two) times daily.    RESTASIS 0.05 % ophthalmic emulsion Place 1 drop into both eyes 2 (two) times daily.    solifenacin (VESICARE) 10 MG tablet Take 1 tablet (10 mg total) by mouth once daily.    tamsulosin (FLOMAX) 0.4 mg Cp24 Take 1 capsule (0.4 mg total) by mouth once daily.    tizanidine (ZANAFLEX) 4 MG tablet ONE TABLET BY MOUTH IN THE EVENING    trazodone (DESYREL) 150 MG tablet Take 2 tablets (300 mg total) by mouth nightly.    XARELTO 15 mg Tab ONE TABLET BY MOUTH TWICE DAILY FOR 21 DAYS    alprazolam (XANAX) 0.5 MG tablet Take 1 tablet (0.5 mg total) by mouth 2 (two) times daily as needed for Anxiety.     No current  "facility-administered medications for this visit.        Review of Systems   Constitutional: Negative for activity change, fatigue, fever and unexpected weight change.   HENT: Negative for congestion.    Eyes: Negative for redness.   Respiratory: Negative for chest tightness and shortness of breath.    Cardiovascular: Negative for chest pain and leg swelling.   Gastrointestinal: Negative for abdominal pain, constipation, diarrhea, nausea and vomiting.   Genitourinary: Negative for dysuria, flank pain, frequency, hematuria, penile pain, penile swelling, scrotal swelling, testicular pain and urgency.   Musculoskeletal: Negative for arthralgias and back pain.   Neurological: Negative for dizziness and light-headedness.   Psychiatric/Behavioral: Negative for behavioral problems and confusion. The patient is not nervous/anxious.    All other systems reviewed and are negative.      Objective:      Vitals:    10/12/17 0943   BP: 130/70   Pulse: 62   Weight: 108.9 kg (240 lb 1.3 oz)   Height: 5' 9" (1.753 m)     Physical Exam   Nursing note and vitals reviewed.  Constitutional: He is oriented to person, place, and time. He appears well-developed and well-nourished.   HENT:   Head: Normocephalic.   Eyes: Conjunctivae are normal.   Neck: Normal range of motion. Neck supple. No tracheal deviation present. No thyromegaly present.   Cardiovascular: Normal rate and normal heart sounds.    Pulmonary/Chest: Effort normal and breath sounds normal. No respiratory distress. He has no wheezes.   Abdominal: Soft. Bowel sounds are normal. There is no hepatosplenomegaly. There is no tenderness. There is no rebound and no CVA tenderness. No hernia.   Genitourinary: Penis normal. Right testis shows tenderness. Left testis shows tenderness. Circumcised.   Genitourinary Comments: Right spermatocele, tender  Both testicles tender   Musculoskeletal: Normal range of motion. He exhibits no edema or tenderness.   Lymphadenopathy:     He has no " cervical adenopathy.   Neurological: He is alert and oriented to person, place, and time.   Skin: Skin is warm and dry. No rash noted. No erythema.     Psychiatric: He has a normal mood and affect. His behavior is normal. Judgment and thought content normal.       Urine dipstick shows negative for all components.  Micro exam: negative for WBC's or RBC's.    Assessment:       1. Orchalgia    2. Pelvic pain in male    3. Urinary frequency    4. BPH without obstruction/lower urinary tract symptoms          Plan:       1. Orchalgia  Monitor  No surgery    - US Scrotum And Testicles; Future  - POCT urinalysis, dipstick or tablet reag    2. Pelvic pain in male  monitor    3. Urinary frequency  Vesicare    4. BPH without obstruction/lower urinary tract symptoms  Flomax            Return in about 3 months (around 1/12/2018) for Follow up, Review X-ray.

## 2017-10-13 ENCOUNTER — TELEPHONE (OUTPATIENT)
Dept: UROLOGY | Facility: CLINIC | Age: 57
End: 2017-10-13

## 2017-10-13 DIAGNOSIS — N39.0 UTI (URINARY TRACT INFECTION), UNCOMPLICATED: Primary | ICD-10-CM

## 2017-10-16 ENCOUNTER — LAB VISIT (OUTPATIENT)
Dept: LAB | Facility: HOSPITAL | Age: 57
End: 2017-10-16
Attending: UROLOGY
Payer: MEDICARE

## 2017-10-16 DIAGNOSIS — N39.0 UTI (URINARY TRACT INFECTION), UNCOMPLICATED: ICD-10-CM

## 2017-10-16 PROCEDURE — 87086 URINE CULTURE/COLONY COUNT: CPT

## 2017-10-17 LAB — BACTERIA UR CULT: NORMAL

## 2017-10-18 ENCOUNTER — TELEPHONE (OUTPATIENT)
Dept: UROLOGY | Facility: CLINIC | Age: 57
End: 2017-10-18

## 2017-10-18 NOTE — TELEPHONE ENCOUNTER
Spoke to pt's wife urine culture negative for infection no antibiotics needed at this time./trevon

## 2017-10-19 ENCOUNTER — OFFICE VISIT (OUTPATIENT)
Dept: PSYCHIATRY | Facility: CLINIC | Age: 57
End: 2017-10-19
Payer: MEDICARE

## 2017-10-19 VITALS
BODY MASS INDEX: 35.4 KG/M2 | WEIGHT: 239 LBS | DIASTOLIC BLOOD PRESSURE: 90 MMHG | SYSTOLIC BLOOD PRESSURE: 142 MMHG | HEART RATE: 84 BPM | HEIGHT: 69 IN

## 2017-10-19 DIAGNOSIS — F41.9 ANXIETY: Primary | ICD-10-CM

## 2017-10-19 PROCEDURE — 99213 OFFICE O/P EST LOW 20 MIN: CPT | Mod: S$PBB,,, | Performed by: PSYCHIATRY & NEUROLOGY

## 2017-10-19 PROCEDURE — 99212 OFFICE O/P EST SF 10 MIN: CPT | Mod: PBBFAC | Performed by: PSYCHIATRY & NEUROLOGY

## 2017-10-19 PROCEDURE — 99999 PR PBB SHADOW E&M-EST. PATIENT-LVL II: CPT | Mod: PBBFAC,,, | Performed by: PSYCHIATRY & NEUROLOGY

## 2017-10-19 RX ORDER — ALPRAZOLAM 0.5 MG/1
0.5 TABLET ORAL 2 TIMES DAILY PRN
Qty: 30 TABLET | Refills: 2 | Status: SHIPPED | OUTPATIENT
Start: 2017-10-19 | End: 2018-01-11

## 2017-10-19 RX ORDER — BUSPIRONE HYDROCHLORIDE 15 MG/1
15 TABLET ORAL 3 TIMES DAILY
Qty: 90 TABLET | Refills: 3 | Status: SHIPPED | OUTPATIENT
Start: 2017-10-19 | End: 2018-01-24 | Stop reason: SDUPTHER

## 2017-10-19 RX ORDER — TRAZODONE HYDROCHLORIDE 150 MG/1
300 TABLET ORAL NIGHTLY
Qty: 60 TABLET | Refills: 3 | Status: SHIPPED | OUTPATIENT
Start: 2017-10-19 | End: 2018-01-24 | Stop reason: SDUPTHER

## 2017-10-19 RX ORDER — CITALOPRAM 40 MG/1
40 TABLET, FILM COATED ORAL DAILY
Qty: 30 TABLET | Refills: 3 | Status: SHIPPED | OUTPATIENT
Start: 2017-10-19 | End: 2018-01-24 | Stop reason: SDUPTHER

## 2017-10-19 NOTE — PROGRESS NOTES
ESTABLISHED OUTPATIENT VISIT   E/M LEVEL 4: [id number]    ENCOUNTER DATE: 10/19/2017  SITE: [facility]    HISTORY    CHIEF COMPLAINT   Min Nunez is a 56 y.o. male who presents for follow up of anxiety/depression.      HPI     Physical issues remain troublesome, affect mood.    Sleeping reasonably well. Watches TV.    Overall appears stable psychiatrically.    Psychiatric Review Of Systems - Is patient experiencing or having changes in:  sleep: adequate with Trazodone  appetite: no  weight: has gained 2 lb's since previous visit  energy/anergy: no  interest/pleasure/anhedonia: no  somatic symptoms: no  libido: no  anxiety/panic: anxiety at times  guilty/hopelessness: no  concentration: no  S.I.B.s/risky behavior: no  Irritability: no  Racing thoughts: no  Impulsive behaviors: no  Paranoia:no  AVH:no    Recent alcohol: no  Recent drug: no    Medical ROS   Multiple physical problems     PAST MEDICAL, FAMILY AND SOCIAL HISTORY: The patient's past medical, family and social history have been reviewed and updated as appropriate within the electronic medical record - see encounter notes.    PSYCHOTROPIC MEDICATIONS   Xanax prn, Trazodone 150-300 mg at bedtime, Buspar 15 mg tid, Celexa 40 mg qam, Depakote 250 mg qam, Depakote 500 mg at bedtime[for headaches], Neurontin 300 mg at bedtime[for pain], Hydroxyzine prn for migraines    Scheduled and PRN Medications     Current Outpatient Prescriptions:     albuterol (PROAIR HFA) 90 mcg/actuation inhaler, Inhale 2 puffs into the lungs every 6 (six) hours as needed for Wheezing., Disp: , Rfl:     alprazolam (XANAX) 0.5 MG tablet, Take 1 tablet (0.5 mg total) by mouth 2 (two) times daily as needed for Anxiety., Disp: 30 tablet, Rfl: 2    ASPIR-LOW 81 mg EC tablet, Take 81 mg by mouth once daily., Disp: , Rfl: 3    busPIRone (BUSPAR) 15 MG tablet, Take 1 tablet (15 mg total) by mouth 3 (three) times daily., Disp: 90 tablet, Rfl: 3    chlorhexidine  (PERIDEX) 0.12 % solution, swish AND SPIT FIFTEEN MILLILITERS BY MOUTH TWICE DAILY, Disp: , Rfl: 0    citalopram (CELEXA) 40 MG tablet, Take 1 tablet (40 mg total) by mouth once daily., Disp: 30 tablet, Rfl: 3    divalproex (DEPAKOTE) 250 MG EC tablet, 1 pill in morning, 1 pill in evening (Patient taking differently: 1 pill in morning, 2 pills  in evening), Disp: 60 tablet, Rfl: 6    fenofibrate 160 MG Tab, Take 160 mg by mouth once daily., Disp: , Rfl:     gabapentin (NEURONTIN) 300 MG capsule, Take 300 mg by mouth once daily., Disp: , Rfl:     hydrOXYzine pamoate (VISTARIL) 25 MG Cap, 1 pill 3x a day for 2 days, rest left over every 8 hours as needed for migraine, Disp: 15 capsule, Rfl: 3    irbesartan (AVAPRO) 300 MG tablet, Take 300 mg by mouth once daily., Disp: , Rfl:     isosorbide mononitrate (IMDUR) 60 MG 24 hr tablet, Take 60 mg by mouth every morning., Disp: , Rfl: 3    meloxicam (MOBIC) 7.5 MG tablet, Take 7.5 mg by mouth 2 (two) times daily. , Disp: , Rfl:     metoprolol succinate (TOPROL-XL) 25 MG 24 hr tablet, Take 25 mg by mouth 2 (two) times daily. , Disp: , Rfl:     mirabegron (MYRBETRIQ) 25 mg Tb24 ER tablet, Take 1 tablet (25 mg total) by mouth once daily., Disp: 90 tablet, Rfl: 3    nitroGLYCERIN (NITROSTAT) 0.4 MG SL tablet, Place 0.4 mg under the tongue every 5 (five) minutes as needed for Chest pain., Disp: , Rfl:     NON FORMULARY MEDICATION, Apply topically 4 (four) times daily as needed. Gabapentin 5%, Orphenadrine 2%, Lidocaine 2%, Prilocaine 2%, Disp: , Rfl:     oxycodone-acetaminophen (PERCOCET)  mg per tablet, Take 1 tablet by mouth every 4 (four) hours as needed for Pain., Disp: 30 tablet, Rfl: 0    pravastatin (PRAVACHOL) 80 MG tablet, Take 80 mg by mouth once daily., Disp: , Rfl: 4    RANEXA 1,000 mg Tb12, Take 1,000 mg by mouth 2 (two) times daily., Disp: , Rfl: 3    RESTASIS 0.05 % ophthalmic emulsion, Place 1 drop into both eyes 2 (two) times daily., Disp: ,  Rfl: 4    solifenacin (VESICARE) 10 MG tablet, Take 1 tablet (10 mg total) by mouth once daily., Disp: 90 tablet, Rfl: 3    tamsulosin (FLOMAX) 0.4 mg Cp24, Take 1 capsule (0.4 mg total) by mouth once daily., Disp: 90 capsule, Rfl: 3    tizanidine (ZANAFLEX) 4 MG tablet, ONE TABLET BY MOUTH IN THE EVENING, Disp: , Rfl: 2    trazodone (DESYREL) 150 MG tablet, Take 2 tablets (300 mg total) by mouth nightly., Disp: 60 tablet, Rfl: 3    XARELTO 15 mg Tab, ONE TABLET BY MOUTH TWICE DAILY FOR 21 DAYS, Disp: , Rfl: 0    EXAMINATION    Vitals:    10/19/17 1349   BP: (!) 142/90   Pulse: 84     Weight 239 lb's    CONSTITUTIONAL  General Appearance: well nourished    MUSCULOSKELETAL  Muscle Strength and Tone: normal strength and tone  Abnormal Involuntary Movements: no abnormal movement noted  Gait and Station: normal gait    PSYCHIATRIC   Level of Consciousness: alert  Orientation: oriented to person, place and time  Grooming: well groomed  Psychomotor Behavior: no restlessness/agitation  Speech: normal in rate, rhythm and volume  Language: normal vocabulary  Mood: anxious at times  Affect: full range and appropriate  Thought Process: logical and goal directed  Associations: intact associations  Thought Content: no SI/HI  Memory: grossly intact  Attention: intact to content of interview  Fund of Knowledge: appears adequate  Insight: good  Judgement: good    MEDICAL DECISION MAKING    DIAGNOSES  Anxiety d/o, unspecified    PROBLEM LIST AND MANAGEMENT PLANS    - anxiety:   continue Xanax 0.5 mg prn[30 tabs for 30 days]  Continue Celexa, Buspar and Trazodone as above for now  - rtc 3 months     Time with patient: 20 min    LABORATORY DATA  Lab Visit on 10/16/2017   Component Date Value Ref Range Status    Urine Culture, Routine 10/17/2017 No significant growth   Final   Office Visit on 10/12/2017   Component Date Value Ref Range Status    Color, UA 10/12/2017 yellow   Final    Spec Grav UA 10/12/2017 1000   Final    pH,  UA 10/12/2017 5   Final    WBC, UA 10/12/2017 neg   Final    Nitrite, UA 10/12/2017 neg   Final    Protein 10/12/2017 neg   Final    Glucose, UA 10/12/2017 neg   Final    Ketones, UA 10/12/2017 neg   Final    Urobilinogen, UA 10/12/2017 neg   Final    Bilirubin 10/12/2017 neg   Final    Blood, UA 10/12/2017 neg   Final           Sampson Luke

## 2017-12-20 ENCOUNTER — HOSPITAL ENCOUNTER (OUTPATIENT)
Dept: RADIOLOGY | Facility: HOSPITAL | Age: 57
Discharge: HOME | End: 2017-12-20
Attending: UROLOGY
Payer: MEDICARE

## 2017-12-20 DIAGNOSIS — N50.819 ORCHALGIA: ICD-10-CM

## 2017-12-20 PROCEDURE — 76870 US EXAM SCROTUM: CPT | Mod: 26,,, | Performed by: RADIOLOGY

## 2017-12-20 PROCEDURE — 76870 US EXAM SCROTUM: CPT | Mod: TC

## 2018-01-10 ENCOUNTER — TELEPHONE (OUTPATIENT)
Dept: UROLOGY | Facility: CLINIC | Age: 58
End: 2018-01-10

## 2018-01-10 NOTE — TELEPHONE ENCOUNTER
----- Message from Dionne Harris sent at 1/10/2018  9:36 AM CST -----  Contact: Jason GRUBBS /Sylvia [phone]  Calling to speak with nurse regarding pt medication requests

## 2018-01-11 ENCOUNTER — OFFICE VISIT (OUTPATIENT)
Dept: UROLOGY | Facility: CLINIC | Age: 58
End: 2018-01-11
Payer: MEDICARE

## 2018-01-11 VITALS
WEIGHT: 241.19 LBS | HEIGHT: 69 IN | DIASTOLIC BLOOD PRESSURE: 86 MMHG | BODY MASS INDEX: 35.72 KG/M2 | SYSTOLIC BLOOD PRESSURE: 140 MMHG

## 2018-01-11 DIAGNOSIS — R35.1 NOCTURIA MORE THAN TWICE PER NIGHT: ICD-10-CM

## 2018-01-11 DIAGNOSIS — N50.819 ORCHALGIA: Primary | ICD-10-CM

## 2018-01-11 DIAGNOSIS — R39.15 URINARY URGENCY: ICD-10-CM

## 2018-01-11 DIAGNOSIS — R10.31 RIGHT INGUINAL PAIN: ICD-10-CM

## 2018-01-11 PROCEDURE — 99213 OFFICE O/P EST LOW 20 MIN: CPT | Mod: PBBFAC | Performed by: UROLOGY

## 2018-01-11 PROCEDURE — 99214 OFFICE O/P EST MOD 30 MIN: CPT | Mod: S$PBB,,, | Performed by: UROLOGY

## 2018-01-11 PROCEDURE — 99999 PR PBB SHADOW E&M-EST. PATIENT-LVL III: CPT | Mod: PBBFAC,,, | Performed by: UROLOGY

## 2018-01-11 PROCEDURE — 81001 URINALYSIS AUTO W/SCOPE: CPT | Mod: PBBFAC | Performed by: UROLOGY

## 2018-01-11 NOTE — PROGRESS NOTES
Subjective:       Patient ID: Min Nunez is a 57 y.o. male who was last seen in this office 10/12/2017    Chief Complaint:   Chief Complaint   Patient presents with    Follow-up     3 months follow up ultrasound       Orchalgia  He had a bilateral epididymectomy on 8/5/2016.  He continues to have right sided swelling and bilateral pain.  His right sided pain is worse than left.   He complains of pain with sitting, standing, getting up to stand, after roly movements, lifting, and walking.  He has difficulty walking or performing exercises.     He is now having intermittent penile and pelvic pain as well.  It happens randomly and is not necessarily associated with voiding or any activity.    He has recently been diagnosed with a acetabular labral tear.    Urinary frequency and Urgency Incontinence  He has some frequency and urgency that is now improving with Vesicare and Myrbetriq and Flomax.    ACTIVE MEDICAL ISSUES:  Patient Active Problem List   Diagnosis    Chest pain, exertional    Coronary artery disease, occlusive    Coronary atherosclerosis of unspecified type of vessel, native or graft    Orchalgia    Dizziness    Syncope    Abnormal brain MRI    Migraines    Atypical migraine    HA (headache)    Numbness    Facial droop    Faintness    Renal stone    Groin pain    Constipation    Hypotension       ALLERGIES AND MEDICATIONS: updated and reviewed.  Review of patient's allergies indicates:  No Known Allergies  Current Outpatient Prescriptions   Medication Sig    albuterol (PROAIR HFA) 90 mcg/actuation inhaler Inhale 2 puffs into the lungs every 6 (six) hours as needed for Wheezing.    busPIRone (BUSPAR) 15 MG tablet Take 1 tablet (15 mg total) by mouth 3 (three) times daily.    chlorhexidine (PERIDEX) 0.12 % solution swish AND SPIT FIFTEEN MILLILITERS BY MOUTH TWICE DAILY    citalopram (CELEXA) 40 MG tablet Take 1 tablet (40 mg total) by mouth once daily.    divalproex (DEPAKOTE) 250  MG EC tablet 1 pill in morning, 1 pill in evening (Patient taking differently: 1 pill in morning, 2 pills  in evening)    fenofibrate 160 MG Tab Take 160 mg by mouth once daily.    gabapentin (NEURONTIN) 300 MG capsule Take 300 mg by mouth once daily.    hydrOXYzine pamoate (VISTARIL) 25 MG Cap 1 pill 3x a day for 2 days, rest left over every 8 hours as needed for migraine    irbesartan (AVAPRO) 300 MG tablet Take 300 mg by mouth once daily.    isosorbide mononitrate (IMDUR) 60 MG 24 hr tablet Take 60 mg by mouth every morning.    meloxicam (MOBIC) 7.5 MG tablet Take 7.5 mg by mouth 2 (two) times daily.     metoprolol succinate (TOPROL-XL) 25 MG 24 hr tablet Take 25 mg by mouth 2 (two) times daily.     mirabegron (MYRBETRIQ) 25 mg Tb24 ER tablet Take 1 tablet (25 mg total) by mouth once daily.    nitroGLYCERIN (NITROSTAT) 0.4 MG SL tablet Place 0.4 mg under the tongue every 5 (five) minutes as needed for Chest pain.    NON FORMULARY MEDICATION Apply topically 4 (four) times daily as needed. Gabapentin 5%, Orphenadrine 2%, Lidocaine 2%, Prilocaine 2%    oxycodone-acetaminophen (PERCOCET)  mg per tablet Take 1 tablet by mouth every 4 (four) hours as needed for Pain.    pravastatin (PRAVACHOL) 80 MG tablet Take 80 mg by mouth once daily.    RANEXA 1,000 mg Tb12 Take 1,000 mg by mouth 2 (two) times daily.    RESTASIS 0.05 % ophthalmic emulsion Place 1 drop into both eyes 2 (two) times daily.    solifenacin (VESICARE) 10 MG tablet Take 1 tablet (10 mg total) by mouth once daily.    tamsulosin (FLOMAX) 0.4 mg Cp24 Take 1 capsule (0.4 mg total) by mouth once daily.    tizanidine (ZANAFLEX) 4 MG tablet ONE TABLET BY MOUTH IN THE EVENING    trazodone (DESYREL) 150 MG tablet Take 2 tablets (300 mg total) by mouth nightly.    XARELTO 15 mg Tab ONE TABLET BY MOUTH TWICE DAILY FOR 21 DAYS     No current facility-administered medications for this visit.        Review of Systems   Constitutional: Negative  "for activity change, fatigue, fever and unexpected weight change.   HENT: Negative for congestion.    Eyes: Negative for redness.   Respiratory: Negative for chest tightness and shortness of breath.    Cardiovascular: Negative for chest pain and leg swelling.   Gastrointestinal: Negative for abdominal pain, constipation, diarrhea, nausea and vomiting.   Genitourinary: Positive for scrotal swelling, testicular pain and urgency. Negative for dysuria, flank pain, frequency, hematuria, penile pain and penile swelling.   Musculoskeletal: Negative for arthralgias and back pain.   Neurological: Negative for dizziness and light-headedness.   Psychiatric/Behavioral: Negative for behavioral problems and confusion. The patient is not nervous/anxious.    All other systems reviewed and are negative.      Objective:      Vitals:    01/11/18 0951   BP: (!) 140/86   BP Location: Left arm   Patient Position: Sitting   BP Method: Large (Manual)   Weight: 109.4 kg (241 lb 2.9 oz)   Height: 5' 9" (1.753 m)     Physical Exam   Nursing note and vitals reviewed.  Constitutional: He is oriented to person, place, and time. He appears well-developed and well-nourished.   HENT:   Head: Normocephalic.   Eyes: Conjunctivae are normal.   Neck: Normal range of motion. Neck supple. No tracheal deviation present. No thyromegaly present.   Cardiovascular: Normal rate and normal heart sounds.    Pulmonary/Chest: Effort normal and breath sounds normal. No respiratory distress. He has no wheezes.   Abdominal: Soft. Bowel sounds are normal. There is no hepatosplenomegaly. There is no tenderness. There is no rebound and no CVA tenderness. No hernia.   Musculoskeletal: Normal range of motion. He exhibits no edema or tenderness.   Lymphadenopathy:     He has no cervical adenopathy.   Neurological: He is alert and oriented to person, place, and time.   Skin: Skin is warm and dry. No rash noted. No erythema.     Psychiatric: He has a normal mood and affect. " His behavior is normal. Judgment and thought content normal.       Urine dipstick shows negative for all components.  Micro exam: negative for WBC's or RBC's.  US Scrotum And Testicles   Status: Final result   MyChart Results Release     MyChart Status: Active Results Release   PACS Images     Show images for US Scrotum And Testicles   Reviewed By Elaina Hendrickson MD on 12/20/2017 12:49   External Result Report     External Result Report   Narrative     Scrotal ultrasound comparison 07/12/2017, right spermatocele, related noted on that exam.    Right testicle 4.2 x 3.1 x 3.4 CM, left 4.6 x 2.7 x 2.9 CM.    Right and left epididymis structures normal.    Right hydrocele, versus spermatocele 4.1 x 3.7 x 3.7 CM, was 3.7 x 4.8 x 3.9 CM.  No left hydrocele.    Testicular arterial and venous flow normal.  No varicoceles.  Testicular tissue homogeneous.   Impression      1.  Complex Spermatocele versus hydrocele right scrotum, unchanged.      Electronically signed by: MADISON JORDAN MD  Date: 12/20/17  Time: 11:45           Assessment:       1. Orchalgia    2. Right inguinal pain    3. Urinary urgency    4. Nocturia more than twice per night          Plan:       1. Orchalgia  stable    2. Right inguinal pain  stable    3. Urinary urgency  improved    4. Nocturia more than twice per night  stable            Follow-up in about 3 months (around 4/11/2018) for Follow up.

## 2018-01-16 RX ORDER — ALPRAZOLAM 0.5 MG/1
0.5 TABLET ORAL 2 TIMES DAILY PRN
Qty: 30 TABLET | Refills: 2 | Status: SHIPPED | OUTPATIENT
Start: 2018-01-16 | End: 2018-01-24 | Stop reason: SDUPTHER

## 2018-01-24 ENCOUNTER — OFFICE VISIT (OUTPATIENT)
Dept: PSYCHIATRY | Facility: CLINIC | Age: 58
End: 2018-01-24
Payer: MEDICARE

## 2018-01-24 VITALS
SYSTOLIC BLOOD PRESSURE: 109 MMHG | BODY MASS INDEX: 34.46 KG/M2 | WEIGHT: 232.63 LBS | DIASTOLIC BLOOD PRESSURE: 56 MMHG | HEIGHT: 69 IN | HEART RATE: 118 BPM

## 2018-01-24 DIAGNOSIS — F41.9 ANXIETY: Primary | ICD-10-CM

## 2018-01-24 PROCEDURE — 99212 OFFICE O/P EST SF 10 MIN: CPT | Mod: PBBFAC | Performed by: PSYCHIATRY & NEUROLOGY

## 2018-01-24 PROCEDURE — 99999 PR PBB SHADOW E&M-EST. PATIENT-LVL II: CPT | Mod: PBBFAC,,, | Performed by: PSYCHIATRY & NEUROLOGY

## 2018-01-24 PROCEDURE — 99213 OFFICE O/P EST LOW 20 MIN: CPT | Mod: S$PBB,,, | Performed by: PSYCHIATRY & NEUROLOGY

## 2018-01-24 RX ORDER — BUSPIRONE HYDROCHLORIDE 15 MG/1
15 TABLET ORAL 3 TIMES DAILY
Qty: 90 TABLET | Refills: 3 | Status: SHIPPED | OUTPATIENT
Start: 2018-01-24 | End: 2018-04-25 | Stop reason: SDUPTHER

## 2018-01-24 RX ORDER — CITALOPRAM 40 MG/1
40 TABLET, FILM COATED ORAL DAILY
Qty: 30 TABLET | Refills: 3 | Status: SHIPPED | OUTPATIENT
Start: 2018-01-24 | End: 2018-04-25 | Stop reason: SDUPTHER

## 2018-01-24 RX ORDER — ALPRAZOLAM 0.5 MG/1
0.5 TABLET ORAL 2 TIMES DAILY PRN
Qty: 30 TABLET | Refills: 2 | Status: SHIPPED | OUTPATIENT
Start: 2018-01-24 | End: 2018-04-25 | Stop reason: SDUPTHER

## 2018-01-24 RX ORDER — TRAZODONE HYDROCHLORIDE 150 MG/1
300 TABLET ORAL NIGHTLY
Qty: 60 TABLET | Refills: 3 | Status: SHIPPED | OUTPATIENT
Start: 2018-01-24 | End: 2018-04-25 | Stop reason: SDUPTHER

## 2018-01-24 NOTE — PROGRESS NOTES
ESTABLISHED OUTPATIENT VISIT   E/M LEVEL 4: [id number]    ENCOUNTER DATE: 1/24/2018  SITE: [facility], [address]    HISTORY    CHIEF COMPLAINT   Min Nunez is a 57 y.o. male who presents for follow up of anxiety/depression.      HPI     Physical issues remain troublesome, affect mood.    Financial stress. Frustration.    Reports taking psychotropic meds.    Psychiatric Review Of Systems - Is patient experiencing or having changes in:  sleep: adequate with Trazodone  appetite: no  weight: no  energy/anergy: no  interest/pleasure/anhedonia: no  somatic symptoms: no  libido: no  anxiety/panic: anxiety at times  guilty/hopelessness: no  concentration: no  S.I.B.s/risky behavior: no  Irritability: no  Racing thoughts: no  Impulsive behaviors: no  Paranoia:no  AVH:no    Recent alcohol: no  Recent drug: no    Medical ROS   Multiple physical problems     PAST MEDICAL, FAMILY AND SOCIAL HISTORY: The patient's past medical, family and social history have been reviewed and updated as appropriate within the electronic medical record - see encounter notes.    PSYCHOTROPIC MEDICATIONS   Xanax prn, Trazodone 150-300 mg at bedtime, Buspar 15 mg tid, Celexa 40 mg qam, Depakote 250 mg qam, Depakote 500 mg at bedtime[for headaches], Neurontin 300 mg at bedtime[for pain], Hydroxyzine prn for migraines    Scheduled and PRN Medications     Current Outpatient Prescriptions:     albuterol (PROAIR HFA) 90 mcg/actuation inhaler, Inhale 2 puffs into the lungs every 6 (six) hours as needed for Wheezing., Disp: , Rfl:     ALPRAZolam (XANAX) 0.5 MG tablet, Take 1 tablet (0.5 mg total) by mouth 2 (two) times daily as needed for Anxiety., Disp: 30 tablet, Rfl: 2    busPIRone (BUSPAR) 15 MG tablet, Take 1 tablet (15 mg total) by mouth 3 (three) times daily., Disp: 90 tablet, Rfl: 3    chlorhexidine (PERIDEX) 0.12 % solution, swish AND SPIT FIFTEEN MILLILITERS BY MOUTH TWICE DAILY, Disp: , Rfl: 0    citalopram (CELEXA) 40 MG  tablet, Take 1 tablet (40 mg total) by mouth once daily., Disp: 30 tablet, Rfl: 3    divalproex (DEPAKOTE) 250 MG EC tablet, 1 pill in morning, 1 pill in evening (Patient taking differently: 1 pill in morning, 2 pills  in evening), Disp: 60 tablet, Rfl: 6    fenofibrate 160 MG Tab, Take 160 mg by mouth once daily., Disp: , Rfl:     gabapentin (NEURONTIN) 300 MG capsule, Take 300 mg by mouth once daily., Disp: , Rfl:     hydrOXYzine pamoate (VISTARIL) 25 MG Cap, 1 pill 3x a day for 2 days, rest left over every 8 hours as needed for migraine, Disp: 15 capsule, Rfl: 3    irbesartan (AVAPRO) 300 MG tablet, Take 300 mg by mouth once daily., Disp: , Rfl:     isosorbide mononitrate (IMDUR) 60 MG 24 hr tablet, Take 60 mg by mouth every morning., Disp: , Rfl: 3    meloxicam (MOBIC) 7.5 MG tablet, Take 7.5 mg by mouth 2 (two) times daily. , Disp: , Rfl:     metoprolol succinate (TOPROL-XL) 25 MG 24 hr tablet, Take 25 mg by mouth 2 (two) times daily. , Disp: , Rfl:     mirabegron (MYRBETRIQ) 25 mg Tb24 ER tablet, Take 1 tablet (25 mg total) by mouth once daily., Disp: 90 tablet, Rfl: 3    nitroGLYCERIN (NITROSTAT) 0.4 MG SL tablet, Place 0.4 mg under the tongue every 5 (five) minutes as needed for Chest pain., Disp: , Rfl:     NON FORMULARY MEDICATION, Apply topically 4 (four) times daily as needed. Gabapentin 5%, Orphenadrine 2%, Lidocaine 2%, Prilocaine 2%, Disp: , Rfl:     oxycodone-acetaminophen (PERCOCET)  mg per tablet, Take 1 tablet by mouth every 4 (four) hours as needed for Pain., Disp: 30 tablet, Rfl: 0    pravastatin (PRAVACHOL) 80 MG tablet, Take 80 mg by mouth once daily., Disp: , Rfl: 4    RANEXA 1,000 mg Tb12, Take 1,000 mg by mouth 2 (two) times daily., Disp: , Rfl: 3    RESTASIS 0.05 % ophthalmic emulsion, Place 1 drop into both eyes 2 (two) times daily., Disp: , Rfl: 4    solifenacin (VESICARE) 10 MG tablet, Take 1 tablet (10 mg total) by mouth once daily., Disp: 90 tablet, Rfl: 3     tamsulosin (FLOMAX) 0.4 mg Cp24, Take 1 capsule (0.4 mg total) by mouth once daily., Disp: 90 capsule, Rfl: 3    tizanidine (ZANAFLEX) 4 MG tablet, ONE TABLET BY MOUTH IN THE EVENING, Disp: , Rfl: 2    trazodone (DESYREL) 150 MG tablet, Take 2 tablets (300 mg total) by mouth nightly., Disp: 60 tablet, Rfl: 3    XARELTO 15 mg Tab, ONE TABLET BY MOUTH TWICE DAILY FOR 21 DAYS, Disp: , Rfl: 0    EXAMINATION    Vitals:    01/24/18 1252   BP: (!) 109/56   Pulse: (!) 118     Weight 232 lb's    CONSTITUTIONAL  General Appearance: well nourished    MUSCULOSKELETAL  Muscle Strength and Tone: normal strength and tone  Abnormal Involuntary Movements: no abnormal movement noted  Gait and Station: normal gait    PSYCHIATRIC   Level of Consciousness: alert  Orientation: oriented to person, place and time  Grooming: well groomed  Psychomotor Behavior: no restlessness/agitation  Speech: normal in rate, rhythm and volume  Language: normal vocabulary  Mood: anxious at times  Affect: full range and appropriate  Thought Process: logical and goal directed  Associations: intact associations  Thought Content: no SI/HI  Memory: grossly intact  Attention: intact to content of interview  Fund of Knowledge: appears adequate  Insight: good  Judgement: good    MEDICAL DECISION MAKING    DIAGNOSES  Anxiety d/o, unspecified    PROBLEM LIST AND MANAGEMENT PLANS    - anxiety:   continue Xanax 0.5 mg prn[30 tabs for 30 days]  Continue Celexa, Buspar and Trazodone as above for now  - rtc 3 months     Time with patient: 20 min    LABORATORY DATA  No visits with results within 3 Month(s) from this visit.   Latest known visit with results is:   Lab Visit on 10/16/2017   Component Date Value Ref Range Status    Urine Culture, Routine 10/16/2017 No significant growth   Final           Sampson Luke

## 2018-02-21 DIAGNOSIS — R35.0 URINARY FREQUENCY: ICD-10-CM

## 2018-02-21 DIAGNOSIS — N40.0 BPH WITHOUT URINARY OBSTRUCTION: ICD-10-CM

## 2018-02-21 RX ORDER — TAMSULOSIN HYDROCHLORIDE 0.4 MG/1
0.4 CAPSULE ORAL DAILY
Qty: 90 CAPSULE | Refills: 3 | Status: SHIPPED | OUTPATIENT
Start: 2018-02-21 | End: 2018-04-10 | Stop reason: SDUPTHER

## 2018-02-21 RX ORDER — SOLIFENACIN SUCCINATE 10 MG/1
10 TABLET, FILM COATED ORAL DAILY
Qty: 90 TABLET | Refills: 3 | Status: SHIPPED | OUTPATIENT
Start: 2018-02-21 | End: 2018-04-10 | Stop reason: SDUPTHER

## 2018-02-21 NOTE — TELEPHONE ENCOUNTER
I called pharmacy and a PA would not be of help in this case. Patient states he will try just taking the Vesicare for now and see how he does on the one medication. If s/s do not subside, he will try to pay for the medication.

## 2018-02-21 NOTE — TELEPHONE ENCOUNTER
----- Message from Jessica Kelley sent at 2/21/2018  2:08 PM CST -----  Contact: self  Pt called stating mirabegron (MYRBETRIQ) 25 mg Tb24 ER tablet is too expensive; please prescribe alternate. He's also requesting 90 day supply for   tamsulosin (FLOMAX) 0.4 mg Cp24  solifenacin (VESICARE) 10 MG tablet    [facility] to [facility] [address] [place] [address]  [place] LOCO [id number] [phone]    Contact pt at [phone].    Thanks-

## 2018-04-10 ENCOUNTER — OFFICE VISIT (OUTPATIENT)
Dept: UROLOGY | Facility: CLINIC | Age: 58
End: 2018-04-10
Payer: MEDICARE

## 2018-04-10 VITALS — RESPIRATION RATE: 16 BRPM | HEIGHT: 69 IN

## 2018-04-10 DIAGNOSIS — R39.15 URINARY URGENCY: ICD-10-CM

## 2018-04-10 DIAGNOSIS — N50.819 ORCHALGIA: Primary | ICD-10-CM

## 2018-04-10 DIAGNOSIS — R35.0 URINARY FREQUENCY: ICD-10-CM

## 2018-04-10 DIAGNOSIS — R35.1 NOCTURIA MORE THAN TWICE PER NIGHT: ICD-10-CM

## 2018-04-10 DIAGNOSIS — N40.0 BPH WITHOUT URINARY OBSTRUCTION: ICD-10-CM

## 2018-04-10 PROCEDURE — 99999 PR PBB SHADOW E&M-EST. PATIENT-LVL IV: CPT | Mod: PBBFAC,,, | Performed by: NURSE PRACTITIONER

## 2018-04-10 PROCEDURE — 81001 URINALYSIS AUTO W/SCOPE: CPT | Mod: PBBFAC | Performed by: NURSE PRACTITIONER

## 2018-04-10 PROCEDURE — 99214 OFFICE O/P EST MOD 30 MIN: CPT | Mod: S$PBB,,, | Performed by: NURSE PRACTITIONER

## 2018-04-10 PROCEDURE — 99214 OFFICE O/P EST MOD 30 MIN: CPT | Mod: PBBFAC | Performed by: NURSE PRACTITIONER

## 2018-04-10 RX ORDER — MUPIROCIN 20 MG/G
OINTMENT TOPICAL 2 TIMES DAILY
Refills: 0 | COMMUNITY
Start: 2018-02-12

## 2018-04-10 RX ORDER — SOLIFENACIN SUCCINATE 10 MG/1
10 TABLET, FILM COATED ORAL DAILY
Qty: 90 TABLET | Refills: 3 | Status: SHIPPED | OUTPATIENT
Start: 2018-04-10 | End: 2018-07-12 | Stop reason: SDUPTHER

## 2018-04-10 RX ORDER — LIDOCAINE HYDROCHLORIDE 30 MG/G
CREAM TOPICAL
Refills: 3 | COMMUNITY
Start: 2018-03-06

## 2018-04-10 RX ORDER — MIDODRINE HYDROCHLORIDE 2.5 MG/1
2.5 TABLET ORAL 2 TIMES DAILY WITH MEALS
COMMUNITY
Start: 2018-04-04

## 2018-04-10 RX ORDER — OMEGA-3-ACID ETHYL ESTERS 1 G/1
2 CAPSULE, LIQUID FILLED ORAL 2 TIMES DAILY
Refills: 3 | COMMUNITY
Start: 2018-02-23

## 2018-04-10 RX ORDER — TAMSULOSIN HYDROCHLORIDE 0.4 MG/1
0.4 CAPSULE ORAL DAILY
Qty: 90 CAPSULE | Refills: 3 | Status: SHIPPED | OUTPATIENT
Start: 2018-04-10 | End: 2018-07-12 | Stop reason: SDUPTHER

## 2018-04-10 NOTE — PROGRESS NOTES
Subjective:       Patient ID: Min Nunez is a 57 y.o. male who is an established patient of Dr. Hendrickson though new to me was last seen in this office 1/11/2018    Chief Complaint:   Chief Complaint   Patient presents with    Follow-up     patient is her  for follow       Orchalgia  He had a bilateral epididymectomy on 8/5/2016.  He continues to have right sided swelling and bilateral pain.  His right sided pain is worse than left.   He complains of pain with sitting, standing, getting up to stand, after bowel movements, lifting, and walking.  He has difficulty walking or performing exercises.     He is now having intermittent penile and pelvic pain as well.  It happens randomly and is not necessarily associated with voiding or any activity.    He has recently been diagnosed with a acetabular labral tear.    Urinary frequency and Urgency Incontinence  He has some frequency and urgency that is now improving with Vesicare and Myrbetriq and Flomax. He is requesting refills for this medication today  ACTIVE MEDICAL ISSUES:  Patient Active Problem List   Diagnosis    Chest pain, exertional    Coronary artery disease, occlusive    Coronary atherosclerosis of unspecified type of vessel, native or graft    Orchalgia    Dizziness    Syncope    Abnormal brain MRI    Migraines    Atypical migraine    HA (headache)    Numbness    Facial droop    Faintness    Renal stone    Groin pain    Constipation    Hypotension    Urinary urgency    Nocturia more than twice per night       ALLERGIES AND MEDICATIONS: updated and reviewed.  Review of patient's allergies indicates:  No Known Allergies  Current Outpatient Prescriptions   Medication Sig    albuterol (PROAIR HFA) 90 mcg/actuation inhaler Inhale 2 puffs into the lungs every 6 (six) hours as needed for Wheezing.    busPIRone (BUSPAR) 15 MG tablet Take 1 tablet (15 mg total) by mouth 3 (three) times daily.    chlorhexidine (PERIDEX) 0.12 % solution swish  AND SPIT FIFTEEN MILLILITERS BY MOUTH TWICE DAILY    citalopram (CELEXA) 40 MG tablet Take 1 tablet (40 mg total) by mouth once daily.    divalproex (DEPAKOTE) 250 MG EC tablet 1 pill in morning, 1 pill in evening (Patient taking differently: 1 pill in morning, 2 pills  in evening)    fenofibrate 160 MG Tab Take 160 mg by mouth once daily.    gabapentin (NEURONTIN) 300 MG capsule Take 300 mg by mouth once daily.    hydrOXYzine pamoate (VISTARIL) 25 MG Cap 1 pill 3x a day for 2 days, rest left over every 8 hours as needed for migraine    irbesartan (AVAPRO) 300 MG tablet Take 300 mg by mouth once daily.    isosorbide mononitrate (IMDUR) 60 MG 24 hr tablet Take 60 mg by mouth every morning.    lidocaine HCl 3 % Crea APPLY 2 grams to the painful area THREE TIMES A DAY    meloxicam (MOBIC) 7.5 MG tablet Take 7.5 mg by mouth 2 (two) times daily.     metoprolol succinate (TOPROL-XL) 25 MG 24 hr tablet Take 25 mg by mouth 2 (two) times daily.     midodrine (PROAMATINE) 2.5 MG Tab     mirabegron (MYRBETRIQ) 25 mg Tb24 ER tablet Take 1 tablet (25 mg total) by mouth once daily.    mupirocin (BACTROBAN) 2 % ointment 2 (two) times daily.    nitroGLYCERIN (NITROSTAT) 0.4 MG SL tablet Place 0.4 mg under the tongue every 5 (five) minutes as needed for Chest pain.    NON FORMULARY MEDICATION Apply topically 4 (four) times daily as needed. Gabapentin 5%, Orphenadrine 2%, Lidocaine 2%, Prilocaine 2%    omega-3 acid ethyl esters (LOVAZA) 1 gram capsule Take 2 capsules by mouth 2 (two) times daily.    oxycodone-acetaminophen (PERCOCET)  mg per tablet Take 1 tablet by mouth every 4 (four) hours as needed for Pain.    pravastatin (PRAVACHOL) 80 MG tablet Take 80 mg by mouth once daily.    RANEXA 1,000 mg Tb12 Take 1,000 mg by mouth 2 (two) times daily.    RESTASIS 0.05 % ophthalmic emulsion Place 1 drop into both eyes 2 (two) times daily.    solifenacin (VESICARE) 10 MG tablet Take 1 tablet (10 mg total) by  "mouth once daily.    tamsulosin (FLOMAX) 0.4 mg Cp24 Take 1 capsule (0.4 mg total) by mouth once daily.    tizanidine (ZANAFLEX) 4 MG tablet ONE TABLET BY MOUTH IN THE EVENING    traZODone (DESYREL) 150 MG tablet Take 2 tablets (300 mg total) by mouth nightly.    XARELTO 15 mg Tab ONE TABLET BY MOUTH TWICE DAILY FOR 21 DAYS    ALPRAZolam (XANAX) 0.5 MG tablet Take 1 tablet (0.5 mg total) by mouth 2 (two) times daily as needed for Anxiety.     No current facility-administered medications for this visit.        Review of Systems   Constitutional: Negative for activity change, chills, fatigue, fever and unexpected weight change.   Eyes: Negative for discharge, redness and visual disturbance.   Respiratory: Negative for cough, shortness of breath and wheezing.    Cardiovascular: Negative for chest pain and leg swelling.   Gastrointestinal: Negative for abdominal distention, abdominal pain, constipation, diarrhea, nausea and vomiting.   Genitourinary: Positive for scrotal swelling and testicular pain. Negative for decreased urine volume, difficulty urinating, dysuria, flank pain, frequency, hematuria, penile pain and urgency.   Musculoskeletal: Negative for arthralgias, joint swelling and myalgias.   Skin: Negative for color change and rash.   Neurological: Negative for dizziness and light-headedness.   Psychiatric/Behavioral: Negative for behavioral problems and confusion. The patient is not nervous/anxious.        Objective:      Vitals:    04/10/18 1437   Resp: 16   Height: 5' 9" (1.753 m)     Physical Exam   Constitutional: He is oriented to person, place, and time. He appears well-developed.   HENT:   Head: Normocephalic and atraumatic.   Nose: Nose normal.   Eyes: Conjunctivae are normal. Right eye exhibits no discharge. Left eye exhibits no discharge.   Neck: Normal range of motion. Neck supple. No tracheal deviation present. No thyromegaly present.   Cardiovascular: Normal rate and regular rhythm.  "   Pulmonary/Chest: Effort normal. No respiratory distress. He has no wheezes.   Abdominal: Soft. He exhibits no distension. There is no hepatosplenomegaly. There is no tenderness. There is no CVA tenderness. No hernia.   Genitourinary: Right testis shows tenderness. Left testis shows no swelling and no tenderness. Circumcised. No penile erythema or penile tenderness. No discharge found.   Genitourinary Comments: Right spermatocele palpable, TTP. No erythema noted    Left testicle without tenderness or erythema. No palpable abnormality noted   Musculoskeletal: Normal range of motion. He exhibits no edema.   Neurological: He is alert and oriented to person, place, and time.   Skin: Skin is warm and dry. No rash noted. No erythema.     Psychiatric: He has a normal mood and affect. His behavior is normal. Judgment normal.       Urine dipstick shows negative for all components.      Assessment:       1. Orchalgia    2. Urinary urgency    3. Nocturia more than twice per night    4. Urinary frequency    5. BPH without urinary obstruction          Plan:       1. Orchalgia  -stable  -Will continue to monitor  - US Scrotum And Testicles; Future    2. Urinary urgency  -improved    3. Nocturia more than twice per night  -stable    4. Urinary frequency  - solifenacin (VESICARE) 10 MG tablet; Take 1 tablet (10 mg total) by mouth once daily.  Dispense: 90 tablet; Refill: 3  - mirabegron (MYRBETRIQ) 25 mg Tb24 ER tablet; Take 1 tablet (25 mg total) by mouth once daily.  Dispense: 90 tablet; Refill: 3    5. BPH without urinary obstruction  - tamsulosin (FLOMAX) 0.4 mg Cp24; Take 1 capsule (0.4 mg total) by mouth once daily.  Dispense: 90 capsule; Refill: 3            Follow-up in about 3 months (around 7/10/2018) for Review X-ray.

## 2018-04-25 ENCOUNTER — OFFICE VISIT (OUTPATIENT)
Dept: PSYCHIATRY | Facility: CLINIC | Age: 58
End: 2018-04-25
Payer: MEDICARE

## 2018-04-25 VITALS
WEIGHT: 243.75 LBS | DIASTOLIC BLOOD PRESSURE: 70 MMHG | SYSTOLIC BLOOD PRESSURE: 119 MMHG | HEIGHT: 69 IN | HEART RATE: 77 BPM | BODY MASS INDEX: 36.1 KG/M2

## 2018-04-25 DIAGNOSIS — F41.9 ANXIETY: Primary | ICD-10-CM

## 2018-04-25 PROCEDURE — 99212 OFFICE O/P EST SF 10 MIN: CPT | Mod: PBBFAC | Performed by: PSYCHIATRY & NEUROLOGY

## 2018-04-25 PROCEDURE — 99999 PR PBB SHADOW E&M-EST. PATIENT-LVL II: CPT | Mod: PBBFAC,,, | Performed by: PSYCHIATRY & NEUROLOGY

## 2018-04-25 PROCEDURE — 99213 OFFICE O/P EST LOW 20 MIN: CPT | Mod: S$PBB,,, | Performed by: PSYCHIATRY & NEUROLOGY

## 2018-04-25 RX ORDER — BUSPIRONE HYDROCHLORIDE 15 MG/1
15 TABLET ORAL 3 TIMES DAILY
Qty: 90 TABLET | Refills: 3 | Status: SHIPPED | OUTPATIENT
Start: 2018-04-25 | End: 2018-08-28 | Stop reason: SDUPTHER

## 2018-04-25 RX ORDER — CITALOPRAM 40 MG/1
40 TABLET, FILM COATED ORAL DAILY
Qty: 30 TABLET | Refills: 3 | Status: SHIPPED | OUTPATIENT
Start: 2018-04-25 | End: 2018-08-28 | Stop reason: SDUPTHER

## 2018-04-25 RX ORDER — ALPRAZOLAM 0.5 MG/1
0.5 TABLET ORAL 2 TIMES DAILY PRN
Qty: 30 TABLET | Refills: 2 | Status: SHIPPED | OUTPATIENT
Start: 2018-04-25 | End: 2018-08-28 | Stop reason: SDUPTHER

## 2018-04-25 RX ORDER — TRAZODONE HYDROCHLORIDE 150 MG/1
300 TABLET ORAL NIGHTLY
Qty: 60 TABLET | Refills: 3 | Status: SHIPPED | OUTPATIENT
Start: 2018-04-25 | End: 2018-08-28 | Stop reason: SDUPTHER

## 2018-04-25 NOTE — PROGRESS NOTES
ESTABLISHED OUTPATIENT VISIT   E/M LEVEL 4: [id number]    ENCOUNTER DATE: 4/25/2018  SITE: [facility], [address]    HISTORY    CHIEF COMPLAINT   Min Nunez is a 57 y.o. male who presents for follow up of anxiety/depression.      HPI     Physical issues remain troublesome, affect mood.    Overall appears stable psychiatrically.    States, that he has been physically unable to go fishing.    Psychiatric Review Of Systems - Is patient experiencing or having changes in:  sleep: varies  appetite: no  weight: has gained 11 lb's since previous visit  energy/anergy: no  interest/pleasure/anhedonia: no  somatic symptoms: no  libido: no  anxiety/panic: anxiety at times  guilty/hopelessness: no  concentration: no  S.I.B.s/risky behavior: no  Irritability: no  Racing thoughts: no  Impulsive behaviors: no  Paranoia:no  AVH:no    Recent alcohol: no  Recent drug: no    Medical ROS   Multiple physical problems     PAST MEDICAL, FAMILY AND SOCIAL HISTORY: The patient's past medical, family and social history have been reviewed and updated as appropriate within the electronic medical record - see encounter notes.    PSYCHOTROPIC MEDICATIONS   Xanax prn, Trazodone 150-300 mg at bedtime, Buspar 15 mg tid, Celexa 40 mg qam, Depakote 250 mg qam, Depakote 500 mg at bedtime[for headaches], Neurontin 300 mg at bedtime[for pain], Hydroxyzine prn for migraines    Scheduled and PRN Medications     Current Outpatient Prescriptions:     albuterol (PROAIR HFA) 90 mcg/actuation inhaler, Inhale 2 puffs into the lungs every 6 (six) hours as needed for Wheezing., Disp: , Rfl:     ALPRAZolam (XANAX) 0.5 MG tablet, Take 1 tablet (0.5 mg total) by mouth 2 (two) times daily as needed for Anxiety., Disp: 30 tablet, Rfl: 2    busPIRone (BUSPAR) 15 MG tablet, Take 1 tablet (15 mg total) by mouth 3 (three) times daily., Disp: 90 tablet, Rfl: 3    chlorhexidine (PERIDEX) 0.12 % solution, swish AND SPIT FIFTEEN MILLILITERS BY MOUTH TWICE  DAILY, Disp: , Rfl: 0    citalopram (CELEXA) 40 MG tablet, Take 1 tablet (40 mg total) by mouth once daily., Disp: 30 tablet, Rfl: 3    divalproex (DEPAKOTE) 250 MG EC tablet, 1 pill in morning, 1 pill in evening (Patient taking differently: 1 pill in morning, 2 pills  in evening), Disp: 60 tablet, Rfl: 6    fenofibrate 160 MG Tab, Take 160 mg by mouth once daily., Disp: , Rfl:     gabapentin (NEURONTIN) 300 MG capsule, Take 300 mg by mouth once daily., Disp: , Rfl:     hydrOXYzine pamoate (VISTARIL) 25 MG Cap, 1 pill 3x a day for 2 days, rest left over every 8 hours as needed for migraine, Disp: 15 capsule, Rfl: 3    irbesartan (AVAPRO) 300 MG tablet, Take 300 mg by mouth once daily., Disp: , Rfl:     isosorbide mononitrate (IMDUR) 60 MG 24 hr tablet, Take 60 mg by mouth every morning., Disp: , Rfl: 3    lidocaine HCl 3 % Crea, APPLY 2 grams to the painful area THREE TIMES A DAY, Disp: , Rfl: 3    meloxicam (MOBIC) 7.5 MG tablet, Take 7.5 mg by mouth 2 (two) times daily. , Disp: , Rfl:     metoprolol succinate (TOPROL-XL) 25 MG 24 hr tablet, Take 25 mg by mouth 2 (two) times daily. , Disp: , Rfl:     midodrine (PROAMATINE) 2.5 MG Tab, , Disp: , Rfl:     mirabegron (MYRBETRIQ) 25 mg Tb24 ER tablet, Take 1 tablet (25 mg total) by mouth once daily., Disp: 90 tablet, Rfl: 3    mupirocin (BACTROBAN) 2 % ointment, 2 (two) times daily., Disp: , Rfl: 0    nitroGLYCERIN (NITROSTAT) 0.4 MG SL tablet, Place 0.4 mg under the tongue every 5 (five) minutes as needed for Chest pain., Disp: , Rfl:     NON FORMULARY MEDICATION, Apply topically 4 (four) times daily as needed. Gabapentin 5%, Orphenadrine 2%, Lidocaine 2%, Prilocaine 2%, Disp: , Rfl:     omega-3 acid ethyl esters (LOVAZA) 1 gram capsule, Take 2 capsules by mouth 2 (two) times daily., Disp: , Rfl: 3    oxycodone-acetaminophen (PERCOCET)  mg per tablet, Take 1 tablet by mouth every 4 (four) hours as needed for Pain., Disp: 30 tablet, Rfl: 0     pravastatin (PRAVACHOL) 80 MG tablet, Take 80 mg by mouth once daily., Disp: , Rfl: 4    RANEXA 1,000 mg Tb12, Take 1,000 mg by mouth 2 (two) times daily., Disp: , Rfl: 3    RESTASIS 0.05 % ophthalmic emulsion, Place 1 drop into both eyes 2 (two) times daily., Disp: , Rfl: 4    solifenacin (VESICARE) 10 MG tablet, Take 1 tablet (10 mg total) by mouth once daily., Disp: 90 tablet, Rfl: 3    tamsulosin (FLOMAX) 0.4 mg Cp24, Take 1 capsule (0.4 mg total) by mouth once daily., Disp: 90 capsule, Rfl: 3    tizanidine (ZANAFLEX) 4 MG tablet, ONE TABLET BY MOUTH IN THE EVENING, Disp: , Rfl: 2    traZODone (DESYREL) 150 MG tablet, Take 2 tablets (300 mg total) by mouth nightly., Disp: 60 tablet, Rfl: 3    XARELTO 15 mg Tab, ONE TABLET BY MOUTH TWICE DAILY FOR 21 DAYS, Disp: , Rfl: 0    EXAMINATION    Vitals:    04/25/18 1315   BP: 119/70   Pulse: 77     Weight 243 lb's    CONSTITUTIONAL  General Appearance: well nourished    MUSCULOSKELETAL  Muscle Strength and Tone: normal strength and tone  Abnormal Involuntary Movements: no abnormal movement noted  Gait and Station: normal gait    PSYCHIATRIC   Level of Consciousness: alert  Orientation: oriented to person, place and time  Grooming: well groomed  Psychomotor Behavior: no restlessness/agitation  Speech: normal in rate, rhythm and volume  Language: normal vocabulary  Mood: anxious at times  Affect: full range and appropriate  Thought Process: logical and goal directed  Associations: intact associations  Thought Content: no SI/HI  Memory: grossly intact  Attention: intact to content of interview  Fund of Knowledge: appears adequate  Insight: good  Judgement: good    MEDICAL DECISION MAKING    DIAGNOSES  Anxiety d/o, unspecified    PROBLEM LIST AND MANAGEMENT PLANS    - anxiety:   continue Xanax 0.5 mg prn[30 tabs for 30 days]  Continue Celexa, Buspar and Trazodone as above for now  - rtc 3 months     Time with patient: 20 min    LABORATORY DATA  No visits with results  within 3 Month(s) from this visit.   Latest known visit with results is:   Lab Visit on 10/16/2017   Component Date Value Ref Range Status    Urine Culture, Routine 10/16/2017 No significant growth   Final           Sampson Luke

## 2018-07-05 ENCOUNTER — HOSPITAL ENCOUNTER (OUTPATIENT)
Dept: RADIOLOGY | Facility: HOSPITAL | Age: 58
Discharge: HOME | End: 2018-07-05
Attending: NURSE PRACTITIONER
Payer: MEDICARE

## 2018-07-05 DIAGNOSIS — N50.819 ORCHALGIA: ICD-10-CM

## 2018-07-05 PROCEDURE — 76870 US EXAM SCROTUM: CPT | Mod: 26,,, | Performed by: RADIOLOGY

## 2018-07-05 PROCEDURE — 76870 US EXAM SCROTUM: CPT | Mod: TC

## 2018-07-12 ENCOUNTER — OFFICE VISIT (OUTPATIENT)
Dept: UROLOGY | Facility: CLINIC | Age: 58
End: 2018-07-12
Payer: MEDICARE

## 2018-07-12 VITALS
SYSTOLIC BLOOD PRESSURE: 138 MMHG | BODY MASS INDEX: 35.62 KG/M2 | HEART RATE: 72 BPM | HEIGHT: 69 IN | WEIGHT: 240.5 LBS | DIASTOLIC BLOOD PRESSURE: 82 MMHG

## 2018-07-12 DIAGNOSIS — R35.0 URINARY FREQUENCY: ICD-10-CM

## 2018-07-12 DIAGNOSIS — N43.40 SPERMATOCELE: ICD-10-CM

## 2018-07-12 DIAGNOSIS — N40.0 BPH WITHOUT OBSTRUCTION/LOWER URINARY TRACT SYMPTOMS: ICD-10-CM

## 2018-07-12 DIAGNOSIS — R35.1 NOCTURIA MORE THAN TWICE PER NIGHT: ICD-10-CM

## 2018-07-12 DIAGNOSIS — N50.819 ORCHALGIA: Primary | ICD-10-CM

## 2018-07-12 PROCEDURE — 99213 OFFICE O/P EST LOW 20 MIN: CPT | Mod: PBBFAC | Performed by: UROLOGY

## 2018-07-12 PROCEDURE — 99999 PR PBB SHADOW E&M-EST. PATIENT-LVL III: CPT | Mod: PBBFAC,,, | Performed by: UROLOGY

## 2018-07-12 PROCEDURE — 81001 URINALYSIS AUTO W/SCOPE: CPT | Mod: PBBFAC | Performed by: UROLOGY

## 2018-07-12 PROCEDURE — 99214 OFFICE O/P EST MOD 30 MIN: CPT | Mod: S$PBB,,, | Performed by: UROLOGY

## 2018-07-12 RX ORDER — SOLIFENACIN SUCCINATE 10 MG/1
10 TABLET, FILM COATED ORAL DAILY
Qty: 90 TABLET | Refills: 3 | Status: SHIPPED | OUTPATIENT
Start: 2018-07-12 | End: 2019-04-02

## 2018-07-12 RX ORDER — TAMSULOSIN HYDROCHLORIDE 0.4 MG/1
0.4 CAPSULE ORAL DAILY
Qty: 90 CAPSULE | Refills: 3 | Status: SHIPPED | OUTPATIENT
Start: 2018-07-12 | End: 2019-03-06 | Stop reason: SDUPTHER

## 2018-07-12 NOTE — PROGRESS NOTES
Subjective:       Patient ID: Min Nunez is a 57 y.o. male who was last seen in this office 4/10/2018    Chief Complaint:   Chief Complaint   Patient presents with    Groin Pain     follow up from ultrasound hx of orchalgia       He had a bilateral epididymectomy on 8/5/2016.  He continues to have right sided swelling and bilateral pain.  His right sided pain is worse than left.   He complains of pain with sitting, standing, getting up to stand, after roly movements, lifting, and walking.  He has difficulty walking or performing exercises.       Urinary frequency and Urgency Incontinence  He has some frequency and urgency that is now improving with Vesicare and Myrbetriq and Flomax.    ACTIVE MEDICAL ISSUES:  Patient Active Problem List   Diagnosis    Chest pain, exertional    Coronary artery disease, occlusive    Coronary atherosclerosis of unspecified type of vessel, native or graft    Orchalgia    Dizziness    Syncope    Abnormal brain MRI    Migraines    Atypical migraine    HA (headache)    Numbness    Facial droop    Faintness    Renal stone    Groin pain    Constipation    Hypotension    Urinary urgency    Nocturia more than twice per night       ALLERGIES AND MEDICATIONS: updated and reviewed.  Review of patient's allergies indicates:  No Known Allergies  Current Outpatient Prescriptions   Medication Sig    albuterol (PROAIR HFA) 90 mcg/actuation inhaler Inhale 2 puffs into the lungs every 6 (six) hours as needed for Wheezing.    busPIRone (BUSPAR) 15 MG tablet Take 1 tablet (15 mg total) by mouth 3 (three) times daily.    chlorhexidine (PERIDEX) 0.12 % solution swish AND SPIT FIFTEEN MILLILITERS BY MOUTH TWICE DAILY    citalopram (CELEXA) 40 MG tablet Take 1 tablet (40 mg total) by mouth once daily.    divalproex (DEPAKOTE) 250 MG EC tablet 1 pill in morning, 1 pill in evening (Patient taking differently: 1 pill in morning, 2 pills  in evening)    fenofibrate 160 MG Tab Take  160 mg by mouth once daily.    gabapentin (NEURONTIN) 300 MG capsule Take 300 mg by mouth once daily.    hydrOXYzine pamoate (VISTARIL) 25 MG Cap 1 pill 3x a day for 2 days, rest left over every 8 hours as needed for migraine    irbesartan (AVAPRO) 300 MG tablet Take 300 mg by mouth once daily.    isosorbide mononitrate (IMDUR) 60 MG 24 hr tablet Take 60 mg by mouth every morning.    lidocaine HCl 3 % Crea APPLY 2 grams to the painful area THREE TIMES A DAY    meloxicam (MOBIC) 7.5 MG tablet Take 7.5 mg by mouth 2 (two) times daily.     metoprolol succinate (TOPROL-XL) 25 MG 24 hr tablet Take 25 mg by mouth 2 (two) times daily.     midodrine (PROAMATINE) 2.5 MG Tab     mirabegron (MYRBETRIQ) 25 mg Tb24 ER tablet Take 1 tablet (25 mg total) by mouth once daily.    mupirocin (BACTROBAN) 2 % ointment 2 (two) times daily.    nitroGLYCERIN (NITROSTAT) 0.4 MG SL tablet Place 0.4 mg under the tongue every 5 (five) minutes as needed for Chest pain.    NON FORMULARY MEDICATION Apply topically 4 (four) times daily as needed. Gabapentin 5%, Orphenadrine 2%, Lidocaine 2%, Prilocaine 2%    omega-3 acid ethyl esters (LOVAZA) 1 gram capsule Take 2 capsules by mouth 2 (two) times daily.    oxycodone-acetaminophen (PERCOCET)  mg per tablet Take 1 tablet by mouth every 4 (four) hours as needed for Pain.    pravastatin (PRAVACHOL) 80 MG tablet Take 80 mg by mouth once daily.    RANEXA 1,000 mg Tb12 Take 1,000 mg by mouth 2 (two) times daily.    RESTASIS 0.05 % ophthalmic emulsion Place 1 drop into both eyes 2 (two) times daily.    solifenacin (VESICARE) 10 MG tablet Take 1 tablet (10 mg total) by mouth once daily.    tamsulosin (FLOMAX) 0.4 mg Cap Take 1 capsule (0.4 mg total) by mouth once daily.    tizanidine (ZANAFLEX) 4 MG tablet ONE TABLET BY MOUTH IN THE EVENING    traZODone (DESYREL) 150 MG tablet Take 2 tablets (300 mg total) by mouth nightly.    XARELTO 15 mg Tab ONE TABLET BY MOUTH TWICE DAILY FOR  "21 DAYS    ALPRAZolam (XANAX) 0.5 MG tablet Take 1 tablet (0.5 mg total) by mouth 2 (two) times daily as needed for Anxiety.     No current facility-administered medications for this visit.        Review of Systems   Constitutional: Negative for activity change, fatigue, fever and unexpected weight change.   HENT: Negative for congestion.    Eyes: Negative for redness.   Respiratory: Negative for chest tightness and shortness of breath.    Cardiovascular: Negative for chest pain and leg swelling.   Gastrointestinal: Negative for abdominal pain, constipation, diarrhea, nausea and vomiting.   Genitourinary: Positive for testicular pain. Negative for dysuria, flank pain, frequency, hematuria, penile pain, penile swelling, scrotal swelling and urgency.   Musculoskeletal: Negative for arthralgias and back pain.   Neurological: Negative for dizziness and light-headedness.   Psychiatric/Behavioral: Negative for behavioral problems and confusion. The patient is not nervous/anxious.    All other systems reviewed and are negative.      Objective:      Vitals:    07/12/18 0930   BP: 138/82   Pulse: 72   Weight: 109.1 kg (240 lb 8.4 oz)   Height: 5' 9" (1.753 m)     Physical Exam   Nursing note and vitals reviewed.  Constitutional: He is oriented to person, place, and time. He appears well-developed and well-nourished.   HENT:   Head: Normocephalic.   Eyes: Conjunctivae are normal.   Neck: Normal range of motion. Neck supple. No tracheal deviation present. No thyromegaly present.   Cardiovascular: Normal rate and normal heart sounds.    Pulmonary/Chest: Effort normal and breath sounds normal. No respiratory distress. He has no wheezes.   Abdominal: Soft. Bowel sounds are normal. There is no hepatosplenomegaly. There is no tenderness. There is no rebound and no CVA tenderness. No hernia.   Genitourinary: Penis normal. Right testis shows tenderness. Left testis shows tenderness. Circumcised.   Genitourinary Comments: Swelling " along right cord consistent with cysts   Musculoskeletal: Normal range of motion. He exhibits no edema or tenderness.   Lymphadenopathy:     He has no cervical adenopathy.   Neurological: He is alert and oriented to person, place, and time.   Skin: Skin is warm and dry. No rash noted. No erythema.     Psychiatric: He has a normal mood and affect. His behavior is normal. Judgment and thought content normal.       Urine dipstick shows negative for all components.  Micro exam: negative for WBC's or RBC's.  US Scrotum And Testicles   Status: Final result   MyChart Results Release     MyChart Status: Active Results Release   PACS Images     Show images for US Scrotum And Testicles   Reviewed By Elaina Hendrickson MD on 7/6/2018 07:22   External Result Report     External Result Report   Narrative     EXAMINATION:  US SCROTUM AND TESTICLES    CLINICAL HISTORY:  Testicular pain, unspecified    TECHNIQUE:  Sonography of the scrotum and testes.    COMPARISON:  None.    FINDINGS:  There is normal flow to both testicles there is a large hydrocele involving the right testicle.  The right testicle measures 1.6 x 3.6 x 3.1 cm in the left testicle measures 4.6 x 2.5 x 2.5 cm.  There is a normal testicular echotexture bilaterally with normal flow seen to both testicles.   Impression       There is a right hydrocele versus spermatocele that appears similar to prior exam.      Electronically signed by: Tanika Siddiqui MD  Date: 07/05/2018  Time: 12:08          Assessment:       1. Orchalgia    2. Spermatocele    3. Nocturia more than twice per night    4. BPH without obstruction/lower urinary tract symptoms    5. Urinary frequency          Plan:       1. Orchalgia  Pain management  We discussed repeat surgery.  I recommend against it.  There is no guarantee the pain will be better.    2. Spermatocele  As above    3. Nocturia more than twice per night  Limit evening fluids    4. BPH without obstruction/lower urinary tract  symptoms    - Prostate Specific Antigen, Diagnostic; Future  - tamsulosin (FLOMAX) 0.4 mg Cap; Take 1 capsule (0.4 mg total) by mouth once daily.  Dispense: 90 capsule; Refill: 3    5. Urinary frequency    - solifenacin (VESICARE) 10 MG tablet; Take 1 tablet (10 mg total) by mouth once daily.  Dispense: 90 tablet; Refill: 3  - mirabegron (MYRBETRIQ) 25 mg Tb24 ER tablet; Take 1 tablet (25 mg total) by mouth once daily.  Dispense: 90 tablet; Refill: 3            Follow-up in about 3 months (around 10/12/2018) for Follow up, Review PSA.

## 2018-08-28 ENCOUNTER — OFFICE VISIT (OUTPATIENT)
Dept: PSYCHIATRY | Facility: CLINIC | Age: 58
End: 2018-08-28
Payer: MEDICARE

## 2018-08-28 VITALS
HEIGHT: 69 IN | HEART RATE: 87 BPM | BODY MASS INDEX: 35.64 KG/M2 | WEIGHT: 240.63 LBS | DIASTOLIC BLOOD PRESSURE: 82 MMHG | SYSTOLIC BLOOD PRESSURE: 146 MMHG

## 2018-08-28 DIAGNOSIS — F41.9 ANXIETY: Primary | ICD-10-CM

## 2018-08-28 PROCEDURE — 99213 OFFICE O/P EST LOW 20 MIN: CPT | Mod: S$PBB,,, | Performed by: PSYCHIATRY & NEUROLOGY

## 2018-08-28 PROCEDURE — 99999 PR PBB SHADOW E&M-EST. PATIENT-LVL II: CPT | Mod: PBBFAC,,, | Performed by: PSYCHIATRY & NEUROLOGY

## 2018-08-28 PROCEDURE — 99212 OFFICE O/P EST SF 10 MIN: CPT | Mod: PBBFAC | Performed by: PSYCHIATRY & NEUROLOGY

## 2018-08-28 RX ORDER — CITALOPRAM 40 MG/1
40 TABLET, FILM COATED ORAL DAILY
Qty: 30 TABLET | Refills: 3 | Status: SHIPPED | OUTPATIENT
Start: 2018-08-28 | End: 2018-12-12 | Stop reason: SDUPTHER

## 2018-08-28 RX ORDER — ALPRAZOLAM 0.5 MG/1
0.5 TABLET ORAL 2 TIMES DAILY PRN
Qty: 30 TABLET | Refills: 2 | Status: SHIPPED | OUTPATIENT
Start: 2018-08-28 | End: 2018-12-12 | Stop reason: SDUPTHER

## 2018-08-28 RX ORDER — TRAZODONE HYDROCHLORIDE 150 MG/1
300 TABLET ORAL NIGHTLY
Qty: 60 TABLET | Refills: 3 | Status: SHIPPED | OUTPATIENT
Start: 2018-08-28 | End: 2018-12-12 | Stop reason: SDUPTHER

## 2018-08-28 RX ORDER — BUSPIRONE HYDROCHLORIDE 15 MG/1
15 TABLET ORAL 3 TIMES DAILY
Qty: 90 TABLET | Refills: 3 | Status: SHIPPED | OUTPATIENT
Start: 2018-08-28 | End: 2018-12-12 | Stop reason: SDUPTHER

## 2018-08-28 NOTE — PROGRESS NOTES
ESTABLISHED OUTPATIENT VISIT   E/M LEVEL 4: 45070    ENCOUNTER DATE: 8/28/2018  SITE: [facility]    HISTORY    CHIEF COMPLAINT   Min Nunez is a 57 y.o. male who presents for follow up of anxiety/depression.      HPI    Son present during today's visit.     Physical issues remain troublesome, affect mood. Neck surgery scheduled for next week.    Overall appears stable psychiatrically.    States, that he has been physically unable to go fishing.    Psychiatric Review Of Systems - Is patient experiencing or having changes in:  sleep: varies  appetite: no  weight: has lost 3 lb's since previous visit  energy/anergy: no  interest/pleasure/anhedonia: no  somatic symptoms: no  libido: no  anxiety/panic: anxiety at times  guilty/hopelessness: no  concentration: no  S.I.B.s/risky behavior: no  Irritability: no  Racing thoughts: no  Impulsive behaviors: no  Paranoia:no  AVH:no    Recent alcohol: no  Recent drug: no    Medical ROS   Multiple physical problems     PAST MEDICAL, FAMILY AND SOCIAL HISTORY: The patient's past medical, family and social history have been reviewed and updated as appropriate within the electronic medical record - see encounter notes.    PSYCHOTROPIC MEDICATIONS   Xanax prn, Trazodone 150-300 mg at bedtime, Buspar 15 mg tid, Celexa 40 mg qam, Depakote 250 mg qam, Depakote 500 mg at bedtime[for headaches], Neurontin 300 mg at bedtime[for pain], Hydroxyzine prn for migraines    Scheduled and PRN Medications     Current Outpatient Medications:     albuterol (PROAIR HFA) 90 mcg/actuation inhaler, Inhale 2 puffs into the lungs every 6 (six) hours as needed for Wheezing., Disp: , Rfl:     ALPRAZolam (XANAX) 0.5 MG tablet, Take 1 tablet (0.5 mg total) by mouth 2 (two) times daily as needed for Anxiety., Disp: 30 tablet, Rfl: 2    busPIRone (BUSPAR) 15 MG tablet, Take 1 tablet (15 mg total) by mouth 3 (three) times daily., Disp: 90 tablet, Rfl: 3    chlorhexidine  (PERIDEX) 0.12 % solution, swish AND SPIT FIFTEEN MILLILITERS BY MOUTH TWICE DAILY, Disp: , Rfl: 0    citalopram (CELEXA) 40 MG tablet, Take 1 tablet (40 mg total) by mouth once daily., Disp: 30 tablet, Rfl: 3    divalproex (DEPAKOTE) 250 MG EC tablet, 1 pill in morning, 1 pill in evening (Patient taking differently: 1 pill in morning, 2 pills  in evening), Disp: 60 tablet, Rfl: 6    fenofibrate 160 MG Tab, Take 160 mg by mouth once daily., Disp: , Rfl:     gabapentin (NEURONTIN) 300 MG capsule, Take 300 mg by mouth once daily., Disp: , Rfl:     hydrOXYzine pamoate (VISTARIL) 25 MG Cap, 1 pill 3x a day for 2 days, rest left over every 8 hours as needed for migraine, Disp: 15 capsule, Rfl: 3    irbesartan (AVAPRO) 300 MG tablet, Take 300 mg by mouth once daily., Disp: , Rfl:     isosorbide mononitrate (IMDUR) 60 MG 24 hr tablet, Take 60 mg by mouth every morning., Disp: , Rfl: 3    lidocaine HCl 3 % Crea, APPLY 2 grams to the painful area THREE TIMES A DAY, Disp: , Rfl: 3    meloxicam (MOBIC) 7.5 MG tablet, Take 7.5 mg by mouth 2 (two) times daily. , Disp: , Rfl:     metoprolol succinate (TOPROL-XL) 25 MG 24 hr tablet, Take 25 mg by mouth 2 (two) times daily. , Disp: , Rfl:     midodrine (PROAMATINE) 2.5 MG Tab, , Disp: , Rfl:     mirabegron (MYRBETRIQ) 25 mg Tb24 ER tablet, Take 1 tablet (25 mg total) by mouth once daily., Disp: 90 tablet, Rfl: 3    mupirocin (BACTROBAN) 2 % ointment, 2 (two) times daily., Disp: , Rfl: 0    nitroGLYCERIN (NITROSTAT) 0.4 MG SL tablet, Place 0.4 mg under the tongue every 5 (five) minutes as needed for Chest pain., Disp: , Rfl:     NON FORMULARY MEDICATION, Apply topically 4 (four) times daily as needed. Gabapentin 5%, Orphenadrine 2%, Lidocaine 2%, Prilocaine 2%, Disp: , Rfl:     omega-3 acid ethyl esters (LOVAZA) 1 gram capsule, Take 2 capsules by mouth 2 (two) times daily., Disp: , Rfl: 3    oxycodone-acetaminophen (PERCOCET)  mg per tablet, Take 1 tablet by  mouth every 4 (four) hours as needed for Pain., Disp: 30 tablet, Rfl: 0    pravastatin (PRAVACHOL) 80 MG tablet, Take 80 mg by mouth once daily., Disp: , Rfl: 4    RANEXA 1,000 mg Tb12, Take 1,000 mg by mouth 2 (two) times daily., Disp: , Rfl: 3    RESTASIS 0.05 % ophthalmic emulsion, Place 1 drop into both eyes 2 (two) times daily., Disp: , Rfl: 4    solifenacin (VESICARE) 10 MG tablet, Take 1 tablet (10 mg total) by mouth once daily., Disp: 90 tablet, Rfl: 3    tamsulosin (FLOMAX) 0.4 mg Cap, Take 1 capsule (0.4 mg total) by mouth once daily., Disp: 90 capsule, Rfl: 3    tizanidine (ZANAFLEX) 4 MG tablet, ONE TABLET BY MOUTH IN THE EVENING, Disp: , Rfl: 2    traZODone (DESYREL) 150 MG tablet, Take 2 tablets (300 mg total) by mouth nightly., Disp: 60 tablet, Rfl: 3    XARELTO 15 mg Tab, ONE TABLET BY MOUTH TWICE DAILY FOR 21 DAYS, Disp: , Rfl: 0    EXAMINATION    Vitals:    08/28/18 1223   BP: (!) 146/82   Pulse: 87     Weight 240 lb's    CONSTITUTIONAL  General Appearance: well nourished    MUSCULOSKELETAL  Muscle Strength and Tone: normal strength and tone  Abnormal Involuntary Movements: no abnormal movement noted  Gait and Station: normal gait    PSYCHIATRIC   Level of Consciousness: alert  Orientation: oriented to person, place and time  Grooming: well groomed  Psychomotor Behavior: no restlessness/agitation  Speech: normal in rate, rhythm and volume  Language: normal vocabulary  Mood: anxious at times  Affect: full range and appropriate  Thought Process: logical and goal directed  Associations: intact associations  Thought Content: no SI/HI  Memory: grossly intact  Attention: intact to content of interview  Fund of Knowledge: appears adequate  Insight: good  Judgement: good    MEDICAL DECISION MAKING    DIAGNOSES  Anxiety d/o, unspecified    PROBLEM LIST AND MANAGEMENT PLANS    - anxiety:   continue Xanax 0.5 mg prn[30 tabs for 30 days]  Continue Celexa, Buspar and Trazodone as above for now  - rtc 3  months     Time with patient: 20 min    LABORATORY DATA  No visits with results within 3 Month(s) from this visit.   Latest known visit with results is:   Lab Visit on 10/16/2017   Component Date Value Ref Range Status    Urine Culture, Routine 10/16/2017 No significant growth   Final           Sampson Luke

## 2018-11-13 ENCOUNTER — LAB VISIT (OUTPATIENT)
Dept: LAB | Facility: HOSPITAL | Age: 58
End: 2018-11-13
Attending: UROLOGY
Payer: MEDICARE

## 2018-11-13 DIAGNOSIS — N40.0 BPH WITHOUT OBSTRUCTION/LOWER URINARY TRACT SYMPTOMS: ICD-10-CM

## 2018-11-13 LAB — COMPLEXED PSA SERPL-MCNC: 0.33 NG/ML

## 2018-11-13 PROCEDURE — 84153 ASSAY OF PSA TOTAL: CPT

## 2018-11-13 PROCEDURE — 36415 COLL VENOUS BLD VENIPUNCTURE: CPT | Mod: PO

## 2018-12-04 ENCOUNTER — OFFICE VISIT (OUTPATIENT)
Dept: UROLOGY | Facility: CLINIC | Age: 58
End: 2018-12-04
Payer: MEDICARE

## 2018-12-04 VITALS — WEIGHT: 236.75 LBS | HEIGHT: 69 IN | BODY MASS INDEX: 35.07 KG/M2

## 2018-12-04 DIAGNOSIS — R35.1 NOCTURIA MORE THAN TWICE PER NIGHT: ICD-10-CM

## 2018-12-04 DIAGNOSIS — N50.819 ORCHALGIA: Primary | ICD-10-CM

## 2018-12-04 DIAGNOSIS — N40.0 BPH WITHOUT OBSTRUCTION/LOWER URINARY TRACT SYMPTOMS: ICD-10-CM

## 2018-12-04 DIAGNOSIS — R39.15 URINARY URGENCY: ICD-10-CM

## 2018-12-04 LAB
BILIRUB SERPL-MCNC: NORMAL MG/DL
BLOOD URINE, POC: NORMAL
COLOR, POC UA: YELLOW
GLUCOSE UR QL STRIP: NORMAL
KETONES UR QL STRIP: NORMAL
LEUKOCYTE ESTERASE URINE, POC: NORMAL
NITRITE, POC UA: NORMAL
PH, POC UA: 6
PROTEIN, POC: NORMAL
SPECIFIC GRAVITY, POC UA: 1000
UROBILINOGEN, POC UA: NORMAL

## 2018-12-04 PROCEDURE — 99213 OFFICE O/P EST LOW 20 MIN: CPT | Mod: PBBFAC | Performed by: UROLOGY

## 2018-12-04 PROCEDURE — 81001 URINALYSIS AUTO W/SCOPE: CPT | Mod: PBBFAC | Performed by: UROLOGY

## 2018-12-04 PROCEDURE — 99999 PR PBB SHADOW E&M-EST. PATIENT-LVL III: CPT | Mod: PBBFAC,,, | Performed by: UROLOGY

## 2018-12-04 PROCEDURE — 99214 OFFICE O/P EST MOD 30 MIN: CPT | Mod: S$PBB,,, | Performed by: UROLOGY

## 2018-12-04 NOTE — PROGRESS NOTES
Subjective:       Patient ID: Min Nunez is a 57 y.o. male who was last seen in this office 7/12/2018    Chief Complaint:   Chief Complaint   Patient presents with    Groin Pain     3 month f/u with psa pt states still having pain in groin area        He had a bilateral epididymectomy on 8/5/2016.  He continues to have right sided swelling and bilateral pain.  His right sided pain is worse than left.   He complains of pain with sitting, standing, getting up to stand, after roly movements, lifting, and walking.  He has difficulty walking or performing exercises.   He wants to consider a procedure.      Urinary frequency and Urgency Incontinence  He has some frequency and urgency that is now improving with Vesicare and Myrbetriq and Flomax.      ACTIVE MEDICAL ISSUES:  Patient Active Problem List   Diagnosis    Chest pain, exertional    Coronary artery disease, occlusive    Coronary atherosclerosis of unspecified type of vessel, native or graft    Orchalgia    Dizziness    Syncope    Abnormal brain MRI    Migraines    Atypical migraine    HA (headache)    Numbness    Facial droop    Faintness    Groin pain    Constipation    Hypotension    Urinary urgency    Nocturia more than twice per night       ALLERGIES AND MEDICATIONS: updated and reviewed.  Review of patient's allergies indicates:  No Known Allergies  Current Outpatient Medications   Medication Sig    albuterol (PROAIR HFA) 90 mcg/actuation inhaler Inhale 2 puffs into the lungs every 6 (six) hours as needed for Wheezing.    busPIRone (BUSPAR) 15 MG tablet Take 1 tablet (15 mg total) by mouth 3 (three) times daily.    chlorhexidine (PERIDEX) 0.12 % solution swish AND SPIT FIFTEEN MILLILITERS BY MOUTH TWICE DAILY    citalopram (CELEXA) 40 MG tablet Take 1 tablet (40 mg total) by mouth once daily.    divalproex (DEPAKOTE) 250 MG EC tablet 1 pill in morning, 1 pill in evening (Patient taking differently: 1 pill in morning, 2 pills  in  evening)    fenofibrate 160 MG Tab Take 160 mg by mouth once daily.    gabapentin (NEURONTIN) 300 MG capsule Take 300 mg by mouth once daily.    hydrOXYzine pamoate (VISTARIL) 25 MG Cap 1 pill 3x a day for 2 days, rest left over every 8 hours as needed for migraine    irbesartan (AVAPRO) 300 MG tablet Take 300 mg by mouth once daily.    isosorbide mononitrate (IMDUR) 60 MG 24 hr tablet Take 60 mg by mouth every morning.    lidocaine HCl 3 % Crea APPLY 2 grams to the painful area THREE TIMES A DAY    meloxicam (MOBIC) 7.5 MG tablet Take 7.5 mg by mouth 2 (two) times daily.     metoprolol succinate (TOPROL-XL) 25 MG 24 hr tablet Take 25 mg by mouth 2 (two) times daily.     midodrine (PROAMATINE) 2.5 MG Tab     mirabegron (MYRBETRIQ) 25 mg Tb24 ER tablet Take 1 tablet (25 mg total) by mouth once daily.    mupirocin (BACTROBAN) 2 % ointment 2 (two) times daily.    nitroGLYCERIN (NITROSTAT) 0.4 MG SL tablet Place 0.4 mg under the tongue every 5 (five) minutes as needed for Chest pain.    NON FORMULARY MEDICATION Apply topically 4 (four) times daily as needed. Gabapentin 5%, Orphenadrine 2%, Lidocaine 2%, Prilocaine 2%    omega-3 acid ethyl esters (LOVAZA) 1 gram capsule Take 2 capsules by mouth 2 (two) times daily.    oxycodone-acetaminophen (PERCOCET)  mg per tablet Take 1 tablet by mouth every 4 (four) hours as needed for Pain.    pravastatin (PRAVACHOL) 80 MG tablet Take 80 mg by mouth once daily.    RANEXA 1,000 mg Tb12 Take 1,000 mg by mouth 2 (two) times daily.    RESTASIS 0.05 % ophthalmic emulsion Place 1 drop into both eyes 2 (two) times daily.    solifenacin (VESICARE) 10 MG tablet Take 1 tablet (10 mg total) by mouth once daily.    tamsulosin (FLOMAX) 0.4 mg Cap Take 1 capsule (0.4 mg total) by mouth once daily.    tizanidine (ZANAFLEX) 4 MG tablet ONE TABLET BY MOUTH IN THE EVENING    traZODone (DESYREL) 150 MG tablet Take 2 tablets (300 mg total) by mouth nightly.    XARELTO 15  "mg Tab ONE TABLET BY MOUTH TWICE DAILY FOR 21 DAYS    ALPRAZolam (XANAX) 0.5 MG tablet Take 1 tablet (0.5 mg total) by mouth 2 (two) times daily as needed for Anxiety.     No current facility-administered medications for this visit.        Review of Systems   Constitutional: Negative for activity change, fatigue, fever and unexpected weight change.   HENT: Negative for congestion.    Eyes: Negative for redness.   Respiratory: Negative for chest tightness and shortness of breath.    Cardiovascular: Negative for chest pain and leg swelling.   Gastrointestinal: Negative for abdominal pain, constipation, diarrhea, nausea and vomiting.   Genitourinary: Negative for dysuria, flank pain, frequency, hematuria, penile pain, penile swelling, scrotal swelling, testicular pain and urgency.   Musculoskeletal: Negative for arthralgias and back pain.   Neurological: Negative for dizziness and light-headedness.   Psychiatric/Behavioral: Negative for behavioral problems and confusion. The patient is not nervous/anxious.    All other systems reviewed and are negative.      Objective:      Vitals:    12/04/18 1425   Weight: 107.4 kg (236 lb 12.4 oz)   Height: 5' 9" (1.753 m)     Physical Exam   Nursing note and vitals reviewed.  Constitutional: He is oriented to person, place, and time. He appears well-developed and well-nourished.   HENT:   Head: Normocephalic.   Eyes: Conjunctivae are normal.   Neck: Normal range of motion. Neck supple. No tracheal deviation present. No thyromegaly present.   Cardiovascular: Normal rate and normal heart sounds.    Pulmonary/Chest: Effort normal and breath sounds normal. No respiratory distress. He has no wheezes.   Abdominal: Soft. Bowel sounds are normal. There is no hepatosplenomegaly. There is no tenderness. There is no rebound and no CVA tenderness. No hernia.   Genitourinary: Penis normal. Right testis shows tenderness. Right testis shows no mass. Left testis shows tenderness. Left testis " shows no mass. Circumcised.   Genitourinary Comments: Right epididymis enlarged consistent with recurrent cysts.  Both sides tender R>L.   No erythema.    Musculoskeletal: Normal range of motion. He exhibits no edema or tenderness.   Lymphadenopathy:     He has no cervical adenopathy.   Neurological: He is alert and oriented to person, place, and time.   Skin: Skin is warm and dry. No rash noted. No erythema.     Psychiatric: He has a normal mood and affect. His behavior is normal. Judgment and thought content normal.       Urine dipstick shows negative for all components.  Micro exam: negative for WBC's or RBC's.      PSA DIAGNOSTIC 0.00 - 4.00 ng/mL 0.33    Comment: PSA Expected levels:   Hormonal Therapy: <0.05 ng/ml   Prostatectomy: <0.01 ng/ml   Radiation Therapy: <1.00 ng/ml    Resulting Agency  [facility]      Narrative   Performed by: [facility]   PSA 3 months      Specimen Collected: 11/13/18 11:30   Last Resulted: 11/13/18 17:47          Assessment:       1. Orchalgia    2. Nocturia more than twice per night    3. Urinary urgency    4. BPH without obstruction/lower urinary tract symptoms          Plan:       1. Orchalgia  I feel that repeat scrotal surgery will not help.  I want him to talk to Dr. Crowley about a block to help with the pain.    - US Scrotum And Testicles; Future  - POCT urinalysis, dipstick or tablet reag    2. Nocturia more than twice per night  stable    3. Urinary urgency  improved    4. BPH without obstruction/lower urinary tract symptoms  JEAN in 6 months            Follow-up in about 3 months (around 3/4/2019) for Follow up.

## 2018-12-12 ENCOUNTER — OFFICE VISIT (OUTPATIENT)
Dept: PSYCHIATRY | Facility: CLINIC | Age: 58
End: 2018-12-12
Payer: MEDICARE

## 2018-12-12 VITALS
HEART RATE: 87 BPM | BODY MASS INDEX: 34.79 KG/M2 | SYSTOLIC BLOOD PRESSURE: 146 MMHG | WEIGHT: 235.56 LBS | DIASTOLIC BLOOD PRESSURE: 83 MMHG

## 2018-12-12 DIAGNOSIS — F41.9 ANXIETY: Primary | ICD-10-CM

## 2018-12-12 PROCEDURE — 99212 OFFICE O/P EST SF 10 MIN: CPT | Mod: PBBFAC | Performed by: PSYCHIATRY & NEUROLOGY

## 2018-12-12 PROCEDURE — 99213 OFFICE O/P EST LOW 20 MIN: CPT | Mod: S$PBB,,, | Performed by: PSYCHIATRY & NEUROLOGY

## 2018-12-12 PROCEDURE — 99999 PR PBB SHADOW E&M-EST. PATIENT-LVL II: CPT | Mod: PBBFAC,,, | Performed by: PSYCHIATRY & NEUROLOGY

## 2018-12-12 RX ORDER — CITALOPRAM 40 MG/1
40 TABLET, FILM COATED ORAL DAILY
Qty: 30 TABLET | Refills: 3 | Status: SHIPPED | OUTPATIENT
Start: 2018-12-12 | End: 2019-03-13 | Stop reason: SDUPTHER

## 2018-12-12 RX ORDER — TRAZODONE HYDROCHLORIDE 150 MG/1
TABLET ORAL
Qty: 45 TABLET | Refills: 3 | Status: SHIPPED | OUTPATIENT
Start: 2018-12-12 | End: 2019-03-13 | Stop reason: SDUPTHER

## 2018-12-12 RX ORDER — BUSPIRONE HYDROCHLORIDE 15 MG/1
15 TABLET ORAL 3 TIMES DAILY
Qty: 90 TABLET | Refills: 3 | Status: SHIPPED | OUTPATIENT
Start: 2018-12-12 | End: 2019-03-13 | Stop reason: SDUPTHER

## 2018-12-12 RX ORDER — ALPRAZOLAM 0.5 MG/1
0.5 TABLET ORAL 2 TIMES DAILY PRN
Qty: 30 TABLET | Refills: 2 | Status: SHIPPED | OUTPATIENT
Start: 2018-12-12 | End: 2019-06-26 | Stop reason: SDUPTHER

## 2018-12-12 NOTE — PROGRESS NOTES
ESTABLISHED OUTPATIENT VISIT   E/M LEVEL 3: [id number]    ENCOUNTER DATE: 12/12/2018  SITE: [facility]    HISTORY    CHIEF COMPLAINT   Min Nunez is a 57 y.o. male who presents for follow up of anxiety/depression.      HPI    Underwent surgery on neck. Only able to whisper since then.    Physical issues remain troublesome, affect mood.     Overall appears stable psychiatrically.    Psychiatric Review Of Systems - Is patient experiencing or having changes in:  sleep: varies  appetite: no  weight: has lost 5 lb's since previous visit  energy/anergy: no  interest/pleasure/anhedonia: no  somatic symptoms: no  libido: no  anxiety/panic: anxiety at times  guilty/hopelessness: no  concentration: no  S.I.B.s/risky behavior: no  Irritability: no  Racing thoughts: no  Impulsive behaviors: no  Paranoia:no  AVH:no    Recent alcohol: no  Recent drug: no    Medical ROS   Multiple physical problems     PAST MEDICAL, FAMILY AND SOCIAL HISTORY: The patient's past medical, family and social history have been reviewed and updated as appropriate within the electronic medical record - see encounter notes.    PSYCHOTROPIC MEDICATIONS   Xanax prn, Trazodone 150-300 mg at bedtime, Buspar 15 mg tid, Celexa 40 mg qam, Depakote 500 mg qam, Depakote 500 mg at bedtime[for headaches], Neurontin 300 mg tid, Hydroxyzine prn for migraines    Scheduled and PRN Medications     Current Outpatient Medications:     albuterol (PROAIR HFA) 90 mcg/actuation inhaler, Inhale 2 puffs into the lungs every 6 (six) hours as needed for Wheezing., Disp: , Rfl:     ALPRAZolam (XANAX) 0.5 MG tablet, Take 1 tablet (0.5 mg total) by mouth 2 (two) times daily as needed for Anxiety., Disp: 30 tablet, Rfl: 2    busPIRone (BUSPAR) 15 MG tablet, Take 1 tablet (15 mg total) by mouth 3 (three) times daily., Disp: 90 tablet, Rfl: 3    chlorhexidine (PERIDEX) 0.12 % solution, swish AND SPIT FIFTEEN MILLILITERS BY MOUTH TWICE DAILY, Disp: , Rfl:  0    citalopram (CELEXA) 40 MG tablet, Take 1 tablet (40 mg total) by mouth once daily., Disp: 30 tablet, Rfl: 3    divalproex (DEPAKOTE) 250 MG EC tablet, 1 pill in morning, 1 pill in evening (Patient taking differently: 1 pill in morning, 2 pills  in evening), Disp: 60 tablet, Rfl: 6    fenofibrate 160 MG Tab, Take 160 mg by mouth once daily., Disp: , Rfl:     gabapentin (NEURONTIN) 300 MG capsule, Take 300 mg by mouth once daily., Disp: , Rfl:     hydrOXYzine pamoate (VISTARIL) 25 MG Cap, 1 pill 3x a day for 2 days, rest left over every 8 hours as needed for migraine, Disp: 15 capsule, Rfl: 3    irbesartan (AVAPRO) 300 MG tablet, Take 300 mg by mouth once daily., Disp: , Rfl:     isosorbide mononitrate (IMDUR) 60 MG 24 hr tablet, Take 60 mg by mouth every morning., Disp: , Rfl: 3    lidocaine HCl 3 % Crea, APPLY 2 grams to the painful area THREE TIMES A DAY, Disp: , Rfl: 3    meloxicam (MOBIC) 7.5 MG tablet, Take 7.5 mg by mouth 2 (two) times daily. , Disp: , Rfl:     metoprolol succinate (TOPROL-XL) 25 MG 24 hr tablet, Take 25 mg by mouth 2 (two) times daily. , Disp: , Rfl:     midodrine (PROAMATINE) 2.5 MG Tab, , Disp: , Rfl:     mirabegron (MYRBETRIQ) 25 mg Tb24 ER tablet, Take 1 tablet (25 mg total) by mouth once daily., Disp: 90 tablet, Rfl: 3    mupirocin (BACTROBAN) 2 % ointment, 2 (two) times daily., Disp: , Rfl: 0    nitroGLYCERIN (NITROSTAT) 0.4 MG SL tablet, Place 0.4 mg under the tongue every 5 (five) minutes as needed for Chest pain., Disp: , Rfl:     NON FORMULARY MEDICATION, Apply topically 4 (four) times daily as needed. Gabapentin 5%, Orphenadrine 2%, Lidocaine 2%, Prilocaine 2%, Disp: , Rfl:     omega-3 acid ethyl esters (LOVAZA) 1 gram capsule, Take 2 capsules by mouth 2 (two) times daily., Disp: , Rfl: 3    oxycodone-acetaminophen (PERCOCET)  mg per tablet, Take 1 tablet by mouth every 4 (four) hours as needed for Pain., Disp: 30 tablet, Rfl: 0    pravastatin  (PRAVACHOL) 80 MG tablet, Take 80 mg by mouth once daily., Disp: , Rfl: 4    RANEXA 1,000 mg Tb12, Take 1,000 mg by mouth 2 (two) times daily., Disp: , Rfl: 3    RESTASIS 0.05 % ophthalmic emulsion, Place 1 drop into both eyes 2 (two) times daily., Disp: , Rfl: 4    solifenacin (VESICARE) 10 MG tablet, Take 1 tablet (10 mg total) by mouth once daily., Disp: 90 tablet, Rfl: 3    tamsulosin (FLOMAX) 0.4 mg Cap, Take 1 capsule (0.4 mg total) by mouth once daily., Disp: 90 capsule, Rfl: 3    tizanidine (ZANAFLEX) 4 MG tablet, ONE TABLET BY MOUTH IN THE EVENING, Disp: , Rfl: 2    traZODone (DESYREL) 150 MG tablet, Take 2 tablets (300 mg total) by mouth nightly., Disp: 60 tablet, Rfl: 3    XARELTO 15 mg Tab, ONE TABLET BY MOUTH TWICE DAILY FOR 21 DAYS, Disp: , Rfl: 0    EXAMINATION    Vitals:    12/12/18 1102   BP: (!) 146/83   Pulse: 87     Weight 235 lb's    CONSTITUTIONAL  General Appearance: well nourished    MUSCULOSKELETAL  Muscle Strength and Tone: normal strength and tone  Abnormal Involuntary Movements: no abnormal movement noted  Gait and Station: normal gait    PSYCHIATRIC   Level of Consciousness: alert  Orientation: oriented to person, place and time  Grooming: well groomed  Psychomotor Behavior: no restlessness/agitation  Speech: normal in rate, rhythm and volume  Language: normal vocabulary  Mood: anxious at times  Affect: full range and appropriate  Thought Process: logical and goal directed  Associations: intact associations  Thought Content: no SI/HI  Memory: grossly intact  Attention: intact to content of interview  Fund of Knowledge: appears adequate  Insight: good  Judgement: good    MEDICAL DECISION MAKING    DIAGNOSES  Anxiety d/o, unspecified    PROBLEM LIST AND MANAGEMENT PLANS    - anxiety:   continue Xanax 0.5 mg prn[30 tabs for 30 days]  Continue Celexa, Buspar and Trazodone as above for now  - rtc 3 months     Time with patient: 20 min    LABORATORY DATA  Office Visit on 12/04/2018    Component Date Value Ref Range Status    Color, UA 12/04/2018 yellow   Final    Spec Grav UA 12/04/2018 1,000   Final    pH, UA 12/04/2018 6   Final    WBC, UA 12/04/2018 neg   Final    Nitrite, UA 12/04/2018 neg   Final    Protein 12/04/2018 neg   Final    Glucose, UA 12/04/2018 neg   Final    Ketones, UA 12/04/2018 neg   Final    Urobilinogen, UA 12/04/2018 neg   Final    Bilirubin 12/04/2018 neg   Final    Blood, UA 12/04/2018 neg   Final   Lab Visit on 11/13/2018   Component Date Value Ref Range Status    PSA DIAGNOSTIC 11/13/2018 0.33  0.00 - 4.00 ng/mL Final    Comment: PSA Expected levels:  Hormonal Therapy: <0.05 ng/ml  Prostatectomy: <0.01 ng/ml  Radiation Therapy: <1.00 ng/ml             Sampson Luke

## 2019-02-15 ENCOUNTER — HOSPITAL ENCOUNTER (OUTPATIENT)
Facility: HOSPITAL | Age: 59
Discharge: HOME | End: 2019-02-15
Attending: EMERGENCY MEDICINE | Admitting: EMERGENCY MEDICINE
Payer: MEDICARE

## 2019-02-15 VITALS
SYSTOLIC BLOOD PRESSURE: 154 MMHG | OXYGEN SATURATION: 95 % | RESPIRATION RATE: 18 BRPM | DIASTOLIC BLOOD PRESSURE: 91 MMHG | HEIGHT: 69 IN | TEMPERATURE: 98 F | WEIGHT: 315 LBS | BODY MASS INDEX: 46.65 KG/M2 | HEART RATE: 81 BPM

## 2019-02-15 DIAGNOSIS — R07.9 CHEST PAIN: ICD-10-CM

## 2019-02-15 DIAGNOSIS — J18.9 PNEUMONIA OF RIGHT LOWER LOBE DUE TO INFECTIOUS ORGANISM: Primary | ICD-10-CM

## 2019-02-15 DIAGNOSIS — R07.9 CHEST PAIN, EXERTIONAL: ICD-10-CM

## 2019-02-15 PROBLEM — F41.9 ANXIETY: Status: ACTIVE | Noted: 2019-02-15

## 2019-02-15 PROBLEM — E78.2 MIXED HYPERLIPIDEMIA: Status: ACTIVE | Noted: 2019-02-15

## 2019-02-15 PROBLEM — I10 HYPERTENSION: Status: ACTIVE | Noted: 2019-02-15

## 2019-02-15 PROBLEM — E66.01 MORBID OBESITY: Status: ACTIVE | Noted: 2019-02-15

## 2019-02-15 LAB
ALBUMIN SERPL BCP-MCNC: 3.7 G/DL
ALP SERPL-CCNC: 67 U/L
ALT SERPL W/O P-5'-P-CCNC: 39 U/L
ANION GAP SERPL CALC-SCNC: 11 MMOL/L
AORTIC ROOT ANNULUS: 3.91 CM
AORTIC VALVE CUSP SEPERATION: 2.21 CM
ASCENDING AORTA: 3.45 CM
AST SERPL-CCNC: 21 U/L
AV INDEX (PROSTH): 0.84
AV MEAN GRADIENT: 4.37 MMHG
AV PEAK GRADIENT: 6.45 MMHG
AV VALVE AREA: 3.17 CM2
AV VELOCITY RATIO: 0.81
BASOPHILS # BLD AUTO: 0.02 K/UL
BASOPHILS NFR BLD: 0.2 %
BILIRUB SERPL-MCNC: 0.7 MG/DL
BNP SERPL-MCNC: 15 PG/ML
BSA FOR ECHO PROCEDURE: 3.04 M2
BUN SERPL-MCNC: 17 MG/DL
CALCIUM SERPL-MCNC: 9.1 MG/DL
CHLORIDE SERPL-SCNC: 101 MMOL/L
CHOLEST SERPL-MCNC: 127 MG/DL
CHOLEST/HDLC SERPL: 5.5 {RATIO}
CO2 SERPL-SCNC: 25 MMOL/L
CREAT SERPL-MCNC: 1 MG/DL
CTP QC/QA: YES
CV ECHO LV RWT: 0.74 CM
DEPRECATED S PYO AG THROAT QL EIA: NEGATIVE
DIFFERENTIAL METHOD: ABNORMAL
DOP CALC AO PEAK VEL: 1.27 M/S
DOP CALC AO VTI: 27.91 CM
DOP CALC LVOT AREA: 3.76 CM2
DOP CALC LVOT DIAMETER: 2.19 CM
DOP CALC LVOT PEAK VEL: 1.03 M/S
DOP CALC LVOT STROKE VOLUME: 88.4 CM3
DOP CALCLVOT PEAK VEL VTI: 23.48 CM
E WAVE DECELERATION TIME: 186.23 MSEC
E/A RATIO: 1.16
E/E' RATIO: 8.8
ECHO LV POSTERIOR WALL: 1.47 CM (ref 0.6–1.1)
EOSINOPHIL # BLD AUTO: 0 K/UL
EOSINOPHIL NFR BLD: 0 %
ERYTHROCYTE [DISTWIDTH] IN BLOOD BY AUTOMATED COUNT: 14.1 %
EST. GFR  (AFRICAN AMERICAN): >60 ML/MIN/1.73 M^2
EST. GFR  (NON AFRICAN AMERICAN): >60 ML/MIN/1.73 M^2
FLUAV AG NPH QL: NEGATIVE
FLUBV AG NPH QL: NEGATIVE
FRACTIONAL SHORTENING: 23 % (ref 28–44)
GLUCOSE SERPL-MCNC: 148 MG/DL
HCT VFR BLD AUTO: 40.6 %
HDLC SERPL-MCNC: 23 MG/DL
HDLC SERPL: 18.1 %
HGB BLD-MCNC: 13.1 G/DL
INTERVENTRICULAR SEPTUM: 1.5 CM (ref 0.6–1.1)
IVRT: 0.13 MSEC
LA MAJOR: 5.44 CM
LA MINOR: 5.11 CM
LA WIDTH: 3.8 CM
LDLC SERPL CALC-MCNC: 75.4 MG/DL
LEFT ATRIUM SIZE: 3.55 CM
LEFT ATRIUM VOLUME INDEX: 21.4 ML/M2
LEFT ATRIUM VOLUME: 60.43 CM3
LEFT INTERNAL DIMENSION IN SYSTOLE: 3.05 CM (ref 2.1–4)
LEFT VENTRICLE DIASTOLIC VOLUME INDEX: 24.37 ML/M2
LEFT VENTRICLE DIASTOLIC VOLUME: 68.91 ML
LEFT VENTRICLE MASS INDEX: 80.1 G/M2
LEFT VENTRICLE SYSTOLIC VOLUME INDEX: 12.9 ML/M2
LEFT VENTRICLE SYSTOLIC VOLUME: 36.45 ML
LEFT VENTRICULAR INTERNAL DIMENSION IN DIASTOLE: 3.97 CM (ref 3.5–6)
LEFT VENTRICULAR MASS: 226.64 G
LV LATERAL E/E' RATIO: 7.33
LV SEPTAL E/E' RATIO: 11
LYMPHOCYTES # BLD AUTO: 1.4 K/UL
LYMPHOCYTES NFR BLD: 13.1 %
MCH RBC QN AUTO: 29.4 PG
MCHC RBC AUTO-ENTMCNC: 32.3 G/DL
MCV RBC AUTO: 91 FL
MONOCYTES # BLD AUTO: 1.3 K/UL
MONOCYTES NFR BLD: 12.4 %
MV PEAK A VEL: 0.57 M/S
MV PEAK E VEL: 0.66 M/S
NEUTROPHILS # BLD AUTO: 8 K/UL
NEUTROPHILS NFR BLD: 74.3 %
NONHDLC SERPL-MCNC: 104 MG/DL
PISA TR MAX VEL: 2.23 M/S
PLATELET # BLD AUTO: 118 K/UL
PMV BLD AUTO: 12.4 FL
POTASSIUM SERPL-SCNC: 3.9 MMOL/L
PROT SERPL-MCNC: 7 G/DL
PULM VEIN S/D RATIO: 1.27
PV PEAK D VEL: 0.41 M/S
PV PEAK S VEL: 0.52 M/S
PV PEAK VELOCITY: 0.85 CM/S
RA MAJOR: 4.96 CM
RA PRESSURE: 3 MMHG
RA WIDTH: 3.57 CM
RBC # BLD AUTO: 4.45 M/UL
RIGHT VENTRICULAR END-DIASTOLIC DIMENSION: 3.96 CM
RV TISSUE DOPPLER FREE WALL SYSTOLIC VELOCITY 1 (APICAL 4 CHAMBER VIEW): 11.36 M/S
SINUS: 3.88 CM
SODIUM SERPL-SCNC: 137 MMOL/L
STJ: 3.48 CM
TDI LATERAL: 0.09
TDI SEPTAL: 0.06
TDI: 0.08
TR MAX PG: 19.89 MMHG
TRICUSPID ANNULAR PLANE SYSTOLIC EXCURSION: 2.56 CM
TRIGL SERPL-MCNC: 143 MG/DL
TROPONIN I SERPL DL<=0.01 NG/ML-MCNC: 0.01 NG/ML
TROPONIN I SERPL DL<=0.01 NG/ML-MCNC: <0.006 NG/ML
TROPONIN I SERPL DL<=0.01 NG/ML-MCNC: <0.006 NG/ML
TSH SERPL DL<=0.005 MIU/L-ACNC: 0.75 UIU/ML
TV REST PULMONARY ARTERY PRESSURE: 23 MMHG
WBC # BLD AUTO: 10.73 K/UL

## 2019-02-15 PROCEDURE — S0028 INJECTION, FAMOTIDINE, 20 MG: HCPCS | Performed by: EMERGENCY MEDICINE

## 2019-02-15 PROCEDURE — 96375 TX/PRO/DX INJ NEW DRUG ADDON: CPT | Mod: 59

## 2019-02-15 PROCEDURE — 80061 LIPID PANEL: CPT

## 2019-02-15 PROCEDURE — 25000003 PHARM REV CODE 250: Performed by: EMERGENCY MEDICINE

## 2019-02-15 PROCEDURE — 93010 EKG 12-LEAD: ICD-10-PCS | Mod: 76,,, | Performed by: INTERNAL MEDICINE

## 2019-02-15 PROCEDURE — G0378 HOSPITAL OBSERVATION PER HR: HCPCS

## 2019-02-15 PROCEDURE — 87040 BLOOD CULTURE FOR BACTERIA: CPT | Mod: 59

## 2019-02-15 PROCEDURE — 96376 TX/PRO/DX INJ SAME DRUG ADON: CPT

## 2019-02-15 PROCEDURE — 96375 TX/PRO/DX INJ NEW DRUG ADDON: CPT | Performed by: EMERGENCY MEDICINE

## 2019-02-15 PROCEDURE — 85025 COMPLETE CBC W/AUTO DIFF WBC: CPT

## 2019-02-15 PROCEDURE — 63600175 PHARM REV CODE 636 W HCPCS: Performed by: EMERGENCY MEDICINE

## 2019-02-15 PROCEDURE — 84484 ASSAY OF TROPONIN QUANT: CPT | Mod: 91

## 2019-02-15 PROCEDURE — 87081 CULTURE SCREEN ONLY: CPT

## 2019-02-15 PROCEDURE — 99285 EMERGENCY DEPT VISIT HI MDM: CPT | Mod: 25

## 2019-02-15 PROCEDURE — 84443 ASSAY THYROID STIM HORMONE: CPT

## 2019-02-15 PROCEDURE — 87880 STREP A ASSAY W/OPTIC: CPT

## 2019-02-15 PROCEDURE — 96365 THER/PROPH/DIAG IV INF INIT: CPT | Mod: 59

## 2019-02-15 PROCEDURE — 93010 ELECTROCARDIOGRAM REPORT: CPT | Mod: ,,, | Performed by: INTERNAL MEDICINE

## 2019-02-15 PROCEDURE — 96361 HYDRATE IV INFUSION ADD-ON: CPT

## 2019-02-15 PROCEDURE — 83880 ASSAY OF NATRIURETIC PEPTIDE: CPT

## 2019-02-15 PROCEDURE — 99220 PR INITIAL OBSERVATION CARE,LEVL III: ICD-10-PCS | Mod: 25,,, | Performed by: INTERNAL MEDICINE

## 2019-02-15 PROCEDURE — 63600175 PHARM REV CODE 636 W HCPCS: Performed by: NURSE PRACTITIONER

## 2019-02-15 PROCEDURE — 25000003 PHARM REV CODE 250: Performed by: NURSE PRACTITIONER

## 2019-02-15 PROCEDURE — 99220 PR INITIAL OBSERVATION CARE,LEVL III: CPT | Mod: 25,,, | Performed by: INTERNAL MEDICINE

## 2019-02-15 PROCEDURE — 93005 ELECTROCARDIOGRAM TRACING: CPT

## 2019-02-15 PROCEDURE — 36415 COLL VENOUS BLD VENIPUNCTURE: CPT

## 2019-02-15 PROCEDURE — 84484 ASSAY OF TROPONIN QUANT: CPT

## 2019-02-15 PROCEDURE — 80053 COMPREHEN METABOLIC PANEL: CPT

## 2019-02-15 PROCEDURE — 96372 THER/PROPH/DIAG INJ SC/IM: CPT | Mod: 59 | Performed by: EMERGENCY MEDICINE

## 2019-02-15 RX ORDER — ALBUTEROL SULFATE 90 UG/1
2 AEROSOL, METERED RESPIRATORY (INHALATION) EVERY 6 HOURS PRN
Qty: 18 G | Refills: 0 | Status: SHIPPED | OUTPATIENT
Start: 2019-02-15

## 2019-02-15 RX ORDER — FUROSEMIDE 10 MG/ML
20 INJECTION INTRAMUSCULAR; INTRAVENOUS ONCE
Status: DISCONTINUED | OUTPATIENT
Start: 2019-02-15 | End: 2019-02-15

## 2019-02-15 RX ORDER — AMOXICILLIN AND CLAVULANATE POTASSIUM 875; 125 MG/1; MG/1
1 TABLET, FILM COATED ORAL EVERY 12 HOURS
Qty: 14 TABLET | Refills: 0 | Status: SHIPPED | OUTPATIENT
Start: 2019-02-15 | End: 2019-02-15 | Stop reason: SDUPTHER

## 2019-02-15 RX ORDER — MORPHINE SULFATE 10 MG/ML
2 INJECTION INTRAMUSCULAR; INTRAVENOUS; SUBCUTANEOUS EVERY 4 HOURS PRN
Status: DISCONTINUED | OUTPATIENT
Start: 2019-02-15 | End: 2019-02-15

## 2019-02-15 RX ORDER — ACETAMINOPHEN 325 MG/1
650 TABLET ORAL EVERY 8 HOURS PRN
Status: DISCONTINUED | OUTPATIENT
Start: 2019-02-15 | End: 2019-02-15 | Stop reason: HOSPADM

## 2019-02-15 RX ORDER — ISOSORBIDE MONONITRATE 30 MG/1
60 TABLET, EXTENDED RELEASE ORAL DAILY
Status: DISCONTINUED | OUTPATIENT
Start: 2019-02-15 | End: 2019-02-15 | Stop reason: HOSPADM

## 2019-02-15 RX ORDER — SODIUM CHLORIDE 0.9 % (FLUSH) 0.9 %
5 SYRINGE (ML) INJECTION
Status: DISCONTINUED | OUTPATIENT
Start: 2019-02-15 | End: 2019-02-15 | Stop reason: HOSPADM

## 2019-02-15 RX ORDER — ENOXAPARIN SODIUM 100 MG/ML
40 INJECTION SUBCUTANEOUS EVERY 24 HOURS
Status: DISCONTINUED | OUTPATIENT
Start: 2019-02-15 | End: 2019-02-15 | Stop reason: HOSPADM

## 2019-02-15 RX ORDER — MORPHINE SULFATE 10 MG/ML
4 INJECTION INTRAMUSCULAR; INTRAVENOUS; SUBCUTANEOUS EVERY 4 HOURS PRN
Status: DISCONTINUED | OUTPATIENT
Start: 2019-02-15 | End: 2019-02-15

## 2019-02-15 RX ORDER — ONDANSETRON 2 MG/ML
4 INJECTION INTRAMUSCULAR; INTRAVENOUS EVERY 8 HOURS PRN
Status: DISCONTINUED | OUTPATIENT
Start: 2019-02-15 | End: 2019-02-15 | Stop reason: HOSPADM

## 2019-02-15 RX ORDER — NITROGLYCERIN 0.4 MG/1
0.4 TABLET SUBLINGUAL EVERY 5 MIN PRN
Status: DISCONTINUED | OUTPATIENT
Start: 2019-02-15 | End: 2019-02-15 | Stop reason: HOSPADM

## 2019-02-15 RX ORDER — FUROSEMIDE 10 MG/ML
20 INJECTION INTRAMUSCULAR; INTRAVENOUS ONCE
Status: COMPLETED | OUTPATIENT
Start: 2019-02-15 | End: 2019-02-15

## 2019-02-15 RX ORDER — AZITHROMYCIN 250 MG/1
250 TABLET, FILM COATED ORAL DAILY
Qty: 6 TABLET | Refills: 0 | Status: SHIPPED | OUTPATIENT
Start: 2019-02-15 | End: 2019-02-15 | Stop reason: SDUPTHER

## 2019-02-15 RX ORDER — ASPIRIN 325 MG
325 TABLET, DELAYED RELEASE (ENTERIC COATED) ORAL
Status: DISCONTINUED | OUTPATIENT
Start: 2019-02-15 | End: 2019-02-15

## 2019-02-15 RX ORDER — AZITHROMYCIN 250 MG/1
250 TABLET, FILM COATED ORAL DAILY
Status: DISCONTINUED | OUTPATIENT
Start: 2019-02-15 | End: 2019-02-15 | Stop reason: HOSPADM

## 2019-02-15 RX ORDER — PRAVASTATIN SODIUM 40 MG/1
80 TABLET ORAL DAILY
Status: DISCONTINUED | OUTPATIENT
Start: 2019-02-15 | End: 2019-02-15 | Stop reason: HOSPADM

## 2019-02-15 RX ORDER — ALBUTEROL SULFATE 90 UG/1
2 AEROSOL, METERED RESPIRATORY (INHALATION) EVERY 6 HOURS PRN
Qty: 18 G | Refills: 0 | Status: SHIPPED | OUTPATIENT
Start: 2019-02-15 | End: 2019-02-15 | Stop reason: SDUPTHER

## 2019-02-15 RX ORDER — IRBESARTAN 150 MG/1
300 TABLET ORAL DAILY
Status: DISCONTINUED | OUTPATIENT
Start: 2019-02-15 | End: 2019-02-15 | Stop reason: HOSPADM

## 2019-02-15 RX ORDER — METOPROLOL SUCCINATE 25 MG/1
25 TABLET, EXTENDED RELEASE ORAL 2 TIMES DAILY
Status: DISCONTINUED | OUTPATIENT
Start: 2019-02-15 | End: 2019-02-15 | Stop reason: HOSPADM

## 2019-02-15 RX ORDER — AMOXICILLIN 250 MG
1 CAPSULE ORAL 2 TIMES DAILY
Status: DISCONTINUED | OUTPATIENT
Start: 2019-02-15 | End: 2019-02-15 | Stop reason: HOSPADM

## 2019-02-15 RX ORDER — AMOXICILLIN AND CLAVULANATE POTASSIUM 875; 125 MG/1; MG/1
1 TABLET, FILM COATED ORAL EVERY 12 HOURS
Qty: 14 TABLET | Refills: 0 | Status: SHIPPED | OUTPATIENT
Start: 2019-02-15 | End: 2019-02-22

## 2019-02-15 RX ORDER — AZITHROMYCIN 250 MG/1
250 TABLET, FILM COATED ORAL DAILY
Qty: 6 TABLET | Refills: 0 | Status: SHIPPED | OUTPATIENT
Start: 2019-02-15 | End: 2019-04-02

## 2019-02-15 RX ORDER — MORPHINE SULFATE 10 MG/ML
4 INJECTION INTRAMUSCULAR; INTRAVENOUS; SUBCUTANEOUS
Status: COMPLETED | OUTPATIENT
Start: 2019-02-15 | End: 2019-02-15

## 2019-02-15 RX ORDER — MORPHINE SULFATE 10 MG/ML
4 INJECTION INTRAMUSCULAR; INTRAVENOUS; SUBCUTANEOUS
Status: DISCONTINUED | OUTPATIENT
Start: 2019-02-15 | End: 2019-02-15

## 2019-02-15 RX ORDER — KETOROLAC TROMETHAMINE 30 MG/ML
30 INJECTION, SOLUTION INTRAMUSCULAR; INTRAVENOUS
Status: COMPLETED | OUTPATIENT
Start: 2019-02-15 | End: 2019-02-15

## 2019-02-15 RX ORDER — AZITHROMYCIN 250 MG/1
500 TABLET, FILM COATED ORAL
Status: COMPLETED | OUTPATIENT
Start: 2019-02-15 | End: 2019-02-15

## 2019-02-15 RX ORDER — ALPRAZOLAM 0.5 MG/1
0.5 TABLET ORAL 2 TIMES DAILY PRN
Status: DISCONTINUED | OUTPATIENT
Start: 2019-02-15 | End: 2019-02-15 | Stop reason: HOSPADM

## 2019-02-15 RX ORDER — FAMOTIDINE 10 MG/ML
20 INJECTION INTRAVENOUS EVERY 12 HOURS
Status: DISCONTINUED | OUTPATIENT
Start: 2019-02-15 | End: 2019-02-15 | Stop reason: HOSPADM

## 2019-02-15 RX ORDER — DOXYCYCLINE HYCLATE 100 MG
100 TABLET ORAL EVERY 12 HOURS
Status: DISCONTINUED | OUTPATIENT
Start: 2019-02-15 | End: 2019-02-15 | Stop reason: HOSPADM

## 2019-02-15 RX ADMIN — FUROSEMIDE 20 MG: 10 INJECTION, SOLUTION INTRAMUSCULAR; INTRAVENOUS at 04:02

## 2019-02-15 RX ADMIN — MORPHINE SULFATE 4 MG: 10 INJECTION INTRAVENOUS at 02:02

## 2019-02-15 RX ADMIN — NITROGLYCERIN 0.4 MG: 0.4 TABLET SUBLINGUAL at 03:02

## 2019-02-15 RX ADMIN — ALPRAZOLAM 0.5 MG: 0.5 TABLET ORAL at 03:02

## 2019-02-15 RX ADMIN — MORPHINE SULFATE 4 MG: 10 INJECTION INTRAVENOUS at 05:02

## 2019-02-15 RX ADMIN — SODIUM CHLORIDE 1000 ML: 0.9 INJECTION, SOLUTION INTRAVENOUS at 03:02

## 2019-02-15 RX ADMIN — DOXYCYCLINE HYCLATE 100 MG: 100 TABLET, COATED ORAL at 08:02

## 2019-02-15 RX ADMIN — ISOSORBIDE MONONITRATE 60 MG: 30 TABLET, EXTENDED RELEASE ORAL at 03:02

## 2019-02-15 RX ADMIN — IRBESARTAN 300 MG: 150 TABLET ORAL at 03:02

## 2019-02-15 RX ADMIN — AZITHROMYCIN 250 MG: 250 TABLET, FILM COATED ORAL at 08:02

## 2019-02-15 RX ADMIN — CEFTRIAXONE 1 G: 1 INJECTION, SOLUTION INTRAVENOUS at 05:02

## 2019-02-15 RX ADMIN — KETOROLAC TROMETHAMINE 30 MG: 30 INJECTION, SOLUTION INTRAMUSCULAR at 04:02

## 2019-02-15 RX ADMIN — AZITHROMYCIN 500 MG: 250 TABLET, FILM COATED ORAL at 03:02

## 2019-02-15 RX ADMIN — SENNOSIDES AND DOCUSATE SODIUM 1 TABLET: 8.6; 5 TABLET ORAL at 08:02

## 2019-02-15 RX ADMIN — ENOXAPARIN SODIUM 40 MG: 100 INJECTION SUBCUTANEOUS at 04:02

## 2019-02-15 RX ADMIN — METOPROLOL SUCCINATE 25 MG: 25 TABLET, EXTENDED RELEASE ORAL at 08:02

## 2019-02-15 RX ADMIN — FAMOTIDINE 20 MG: 10 INJECTION, SOLUTION INTRAVENOUS at 08:02

## 2019-02-15 NOTE — ED PROVIDER NOTES
Encounter Date: 2/15/2019       History     Chief Complaint   Patient presents with    Chest Pain     midsternal x 20 minutes, woke pt up out of sleep 10/10, given 2SL nitro and 325 ASA rates pain 5/10     58-year-old male with a past medical history of heart attacks with heart stents, cholesterol, hypertension, stroke with residual slight facial droop presenting today secondary to 10 at 10 chest pain that resolved down to 5/5 chest pain on arrival and states that it is moving down with those interventions of aspirin and nitro.  States that he had been having a fever yesterday (101.8) and the day before and having some body aches on review of systems.  Some cough.  No increased frequency of urination or burning on urination.  No change in bowel movements.  No rashes or lesions noted. No recent travel.  No unilateral leg swelling or leg swelling in general.  States that his throat is a little bit on the sore side.  No other complaints at this particular time.  [place] due to them being on diversion he got diverted to here.  Patient states this been years since he has had a stress test, echo, or heart catheterization.  Last admission 6 months ago    2014 Heart Cath  C. SUMMARY:    1. 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  Unable to be crossed emergently a month ago.  2. Successful recannalization of the RCA with ZOLTAN to the proximal and mid vessel (60mm long).    D. RECOMMENDATIONS:    1. 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  2. Cardiac rehab.  3. F/u [facility] Dr. Watkins for general cardiology and cardiac rehab.          Review of patient's allergies indicates:  No Known Allergies  Past Medical History:   Diagnosis Date    Arthritis     Black-out (not amnesia)     Cardiac angina     pt states intermittent since 2014. Regularly sees Dr. Flaherty    Coronary artery disease     Hearing loss     Hypertension     Neck problem     problems C5-6-pain radiates to both arms  and down spine    Stroke     mini  stroke end of  or beginning of      Past Surgical History:   Procedure Laterality Date    CORONARY ANGIOPLASTY      x 3 angiograms . 1st angio no stents place. 2nd Angio x2 stents placed., 3rd angio f/u to check coronary arteries again. no intervention taht last time    CORONARY STENT PLACEMENT      x 2 stents    CYST REMOVAL Left     posterior wrist    EPIDIDYMECTOMY Bilateral 2016    Performed by MARIA DE JESUS Hendrickson [facility] at [facility] OR    INSERTION, STENT, CORONARY ARTERY N/A 2014    Performed by Joshua Murillo MD at [facility] CATH LAB    neck surgery 2018      testicular surgery 2016       Family History   Problem Relation Age of Onset    Hypertension Mother     Heart disease Father     Heart failure Father     Hypertension Father     Heart attack Father      Social History     Tobacco Use    Smoking status: Former Smoker     Packs/day: 0.50     Types: Cigarettes     Last attempt to quit: 9/3/2014     Years since quittin.4    Smokeless tobacco: Former User     Types: Snuff     Quit date: 9/3/2007    Tobacco comment: pt states quit smoking 7 months ago   Substance Use Topics    Alcohol use: No     Alcohol/week: 0.6 - 1.2 oz     Types: 1 - 2 Standard drinks or equivalent per week    Drug use: No     Review of Systems   Constitutional: Positive for fatigue and fever. Negative for activity change.   HENT: Negative for congestion and rhinorrhea.    Eyes: Negative for pain and visual disturbance.   Respiratory: Positive for chest tightness and shortness of breath.    Cardiovascular: Positive for chest pain.   Gastrointestinal: Negative for abdominal distention and abdominal pain.   Endocrine: Negative for polydipsia and polyuria.   Genitourinary: Negative for difficulty urinating and dysuria.   Musculoskeletal: Negative for arthralgias and back pain.   Skin: Negative for color change and wound.   Neurological: Negative for dizziness and weakness.    Hematological: Does not bruise/bleed easily.   Psychiatric/Behavioral: Negative for agitation and behavioral problems.   All other systems reviewed and are negative.      Physical Exam     Initial Vitals [02/15/19 0132]   BP Pulse Resp Temp SpO2   114/72 84 18 99.5 °F (37.5 °C) 96 %      MAP       --         Physical Exam    Nursing note and vitals reviewed.  Constitutional: He appears well-developed and well-nourished.   HENT:   Head: Normocephalic and atraumatic.   Mouth/Throat: Oropharynx is clear and moist.   Eyes: EOM are normal. Pupils are equal, round, and reactive to light.   Neck: Normal range of motion. No tracheal deviation present.   Cardiovascular: Normal rate, regular rhythm and intact distal pulses.   Equal radial pulses bilaterally    No chest wall tenderness   Pulmonary/Chest: Breath sounds normal. No respiratory distress.   Abdominal: Soft. Bowel sounds are normal. He exhibits no distension.   Musculoskeletal: Normal range of motion. He exhibits no edema or tenderness.   Neurological: He is alert and oriented to person, place, and time. GCS score is 15. GCS eye subscore is 4. GCS verbal subscore is 5. GCS motor subscore is 6.   Skin: Skin is warm and dry. Capillary refill takes less than 2 seconds.   Psychiatric: He has a normal mood and affect. Thought content normal.         ED Course   Procedures  Labs Reviewed   CBC W/ AUTO DIFFERENTIAL - Abnormal; Notable for the following components:       Result Value    RBC 4.45 (*)     Hemoglobin 13.1 (*)     Platelets 118 (*)     Gran # (ANC) 8.0 (*)     Mono # 1.3 (*)     Gran% 74.3 (*)     Lymph% 13.1 (*)     All other components within normal limits   COMPREHENSIVE METABOLIC PANEL - Abnormal; Notable for the following components:    Glucose 148 (*)     All other components within normal limits   THROAT SCREEN, RAPID   CULTURE, STREP A,  THROAT   B-TYPE NATRIURETIC PEPTIDE   TROPONIN I   TROPONIN I   POCT INFLUENZA A/B     EKG Readings: (Independently  Interpreted)   EKG done at 1:39 a.m. on February 15, 2019 showing normal sinus rhythm with a rate of 92.  Incomplete right bundle branch block.  No ST elevation.  Normal axis.  Compared to previous and unchanged.  Nonspecific EKG.    Repeat EKG done at 3:23 a.m..  Normal sinus rhythm with a rate of 90.  Incomplete right bundle-branch block.  No ST elevation or T-wave abnormality.  Normal axis and QRS.  Compared to previous EKG and unchanged.       Imaging Results          X-Ray Chest AP Portable (Final result)  Result time 02/15/19 02:06:54    Final result by Rip Orozco MD (02/15/19 02:06:54)                 Impression:      See above.      Electronically signed by: Rip Orozco MD  Date:    02/15/2019  Time:    02:06             Narrative:    EXAMINATION:  XR CHEST AP PORTABLE    CLINICAL HISTORY:  chest pain;    TECHNIQUE:  Single frontal view of the chest was performed.    COMPARISON:  June 2015.    FINDINGS:  Heart is normal in size.  Loop recorder device is seen.  Lungs are symmetrically expanded.  Nonspecific increased attenuation is seen within the right lower lung zone.  Additional small focal opacity is seen within the right midlung zone which is somewhat nodular in configuration.  No evidence of focal consolidation. Findings may reflect infectious or inflammatory process.  Potential pulmonary nodule not excluded.  Future radiograph follow-up may be obtained to ensure resolution versus future nonemergent CT follow-up.  No evidence of pneumothorax or significant effusion.  No acute osseous abnormality identified.                                 Medical Decision Making:   History:   Old Medical Records: I decided to obtain old medical records.  Old Records Summarized: records from previous admission(s).       <> Summary of Records: Previous admission with heart catheterization back in 2014 with stents placed  Initial Assessment:   58 year old patient with hx of heart attacks with heart stents,  "cholesterol, hypertension, stroke with residual slight facial droop presenting secondary to chest pain and fever and body aches. Patient is at higher risk of ACS due to risk factors and HPI with a heart score of 4 but pneumonia is also a consideration for this patient due to his symptoms. Patient received 4 mg of morphine for pain.     [email or web address]    Also considered but less likely:     PE: normal rate, no recent immovilization/surgery/travel/family history  Tamponade: unlikely due to chest xray and ekg  STEMI: No STEMI on ekg  Dissection: equal pulses bilaterally and no ripping chest pain to the back  Esophageal rupture: no dysphagia or vomiting and chest xray negative for mediastinal air  Arrhythmia: no arrhythmia on ekg  CHF: no fluid overload on Cxr and physical exam  Pneumothorax: bilateral breath sounds and no signs of pneumothorax on chest xray     Troponins, Cxr, ekg, and labs ordered which concerning for pneumonia/inflammation on cxr.  With history of body aches, cough, fever and chest pain pneumonia does fit HPI.  Labs reassuring and ekg reassuring. Will repeat troponin at 4:10am and if negative to be discharged. Not septic. Azithromycin for CAP ordered for patient. Patient agreeable with plan.  Patient states that due to neck surgery he was told he would be at a higher risk of developing pneumonia.  Port score 66    3:26 AM  Patient complaining of some mild chest pain. Doesn't want anything strong so refused morphine. requested "one nitro". Repeat ekg unchanged. Pending second troponin at 4:10 am.  Discuss lab results with the patient who states that he was reassured with this information.  Patient agreeable on getting 2nd troponin at 4:10 a.m..    4:00 AM  Signed out to Dr Mcnamara pending repeat troponin and reassessment. If patient comfortable with going home on abx and repeat troponin negative to MO.     Aayush Mcnamara  Patient with recurrent chest pain, " worsening, requesting pain medication. Reported Hx of cardiac disease, abnormal stress, and stent. Given this, hearts score high, will admit for ACS r/o.         Clinical Tests:   Lab Tests: Ordered and Reviewed  Radiological Study: Reviewed and Ordered  Medical Tests: Ordered and Reviewed                      Clinical Impression:       ICD-10-CM ICD-9-CM   1. Pneumonia of right lower lobe due to infectious organism J18.1 486   2. Chest pain R07.9 786.50   3. Chest pain, exertional R07.9 786.50         Disposition:   Disposition: Placed in Observation  Condition: Stable                        Aayush Mcnamara MD  03/28/19 0243

## 2019-02-15 NOTE — ASSESSMENT & PLAN NOTE
Anxiety likely contributory to patient's symptom.  Restarted Xanax 0.5 while in the hospital, he can continue this dose when he discharges but recommend weaning.  He does see psych outpatient, will defer

## 2019-02-15 NOTE — PLAN OF CARE
To patient's room to discuss patient managing his care at home.      TN Role Explained.  Patient identified by using 2 identifiers:  Name and date of birth    Patient stated that his son WILL HELP AT HOME WITH his RECOVERY.    Patient states that he normally sees [facility] doctors.  Dr Flaherty is his cardiologist.    TN name and contact info placed on the communication board    Preferred Pharmacy:  [facility] - [place], [place] - [address]  [address]  [place] [address]  Phone: [phone] Fax: [phone]       02/15/19 1607   Discharge Assessment   Assessment Type Discharge Planning Assessment   Confirmed/corrected address and phone number on facesheet? Yes   Assessment information obtained from? Patient   Expected Length of Stay (days) 2   Communicated expected length of stay with patient/caregiver yes   Prior to hospitilization cognitive status: Alert/Oriented   Prior to hospitalization functional status: Assistive Equipment;Needs Assistance   Current cognitive status: Alert/Oriented   Current Functional Status: Assistive Equipment;Needs Assistance   Lives With child(kimberly), adult   Able to Return to Prior Arrangements yes   Is patient able to care for self after discharge? No   Who are your caregiver(s) and their phone number(s)? Scott Mcleod [phone]   Patient's perception of discharge disposition home or selfcare   Readmission Within the Last 30 Days no previous admission in last 30 days   Patient currently being followed by outpatient case management? No   Patient currently receives any other outside agency services? No   Equipment Currently Used at Home cane, straight;oxygen   Do you have any problems affording any of your prescribed medications? Yes   If yes, what medications? during deductable period problem with arthritis meds   Is the patient taking medications as prescribed? yes   Does the patient have transportation home? Yes   Transportation Anticipated family or  friend will provide   Does the patient receive services at the [facility]? No   Discharge Plan A Home with family   Discharge Plan B Home Health   DME Needed Upon Discharge  (TBD)   Patient/Family in Agreement with Plan yes

## 2019-02-15 NOTE — SUBJECTIVE & OBJECTIVE
Past Medical History:   Diagnosis Date    Arthritis     Black-out (not amnesia)     Cardiac angina     pt states intermittent since 2014. Regularly sees Dr. Flaherty    Coronary artery disease     Hearing loss     Hypertension     Neck problem     problems C5-6-pain radiates to both arms and down spine    Stroke     mini  stroke end of 2014 or beginning of 2015       Past Surgical History:   Procedure Laterality Date    CORONARY ANGIOPLASTY      x 3 angiograms 2014. 1st angio no stents place. 2nd Angio x2 stents placed., 3rd angio f/u to check coronary arteries again. no intervention taht last time    CORONARY STENT PLACEMENT      x 2 stents    CYST REMOVAL Left     posterior wrist    EPIDIDYMECTOMY Bilateral 8/5/2016    Performed by MARIA DE JESUS [facility]rickson [facility] at [facility] OR    INSERTION, STENT, CORONARY ARTERY N/A 4/23/2014    Performed by Joshua Murillo MD at [facility] CATH LAB    neck surgery 2018      testicular surgery 2016         Review of patient's allergies indicates:  No Known Allergies    No current facility-administered medications on file prior to encounter.      Current Outpatient Medications on File Prior to Encounter   Medication Sig    ALPRAZolam (XANAX) 0.5 MG tablet Take 1 tablet (0.5 mg total) by mouth 2 (two) times daily as needed for Anxiety.    busPIRone (BUSPAR) 15 MG tablet Take 1 tablet (15 mg total) by mouth 3 (three) times daily.    chlorhexidine (PERIDEX) 0.12 % solution swish AND SPIT FIFTEEN MILLILITERS BY MOUTH TWICE DAILY    citalopram (CELEXA) 40 MG tablet Take 1 tablet (40 mg total) by mouth once daily.    divalproex (DEPAKOTE) 250 MG EC tablet 1 pill in morning, 1 pill in evening (Patient taking differently: Take 500 mg by mouth 2 (two) times daily. )    fenofibrate 160 MG Tab Take 160 mg by mouth once daily.    gabapentin (NEURONTIN) 300 MG capsule Take 300 mg by mouth once daily.    hydrOXYzine pamoate (VISTARIL) 25 MG Cap 1 pill 3x a day for 2 days, rest left over  every 8 hours as needed for migraine (Patient taking differently: Take 25 mg by mouth once daily. 1 pill 3x a day for 2 days, rest left over every 8 hours as needed for migraine)    irbesartan (AVAPRO) 300 MG tablet Take 300 mg by mouth once daily.    isosorbide mononitrate (IMDUR) 60 MG 24 hr tablet Take 60 mg by mouth every morning.    lidocaine HCl 3 % Crea APPLY 2 grams to the painful area THREE TIMES A DAY    meloxicam (MOBIC) 7.5 MG tablet Take 7.5 mg by mouth 2 (two) times daily.     metoprolol succinate (TOPROL-XL) 25 MG 24 hr tablet Take 25 mg by mouth 2 (two) times daily.     midodrine (PROAMATINE) 2.5 MG Tab Take 2.5 mg by mouth 2 (two) times daily with meals.     mirabegron (MYRBETRIQ) 25 mg Tb24 ER tablet Take 1 tablet (25 mg total) by mouth once daily.    mupirocin (BACTROBAN) 2 % ointment 2 (two) times daily.    nitroGLYCERIN (NITROSTAT) 0.4 MG SL tablet Place 0.4 mg under the tongue every 5 (five) minutes as needed for Chest pain.    NON FORMULARY MEDICATION Apply topically 4 (four) times daily as needed. Gabapentin 5%, Orphenadrine 2%, Lidocaine 2%, Prilocaine 2%    omega-3 acid ethyl esters (LOVAZA) 1 gram capsule Take 2 capsules by mouth 2 (two) times daily.    oxycodone-acetaminophen (PERCOCET)  mg per tablet Take 1 tablet by mouth every 4 (four) hours as needed for Pain.    pravastatin (PRAVACHOL) 80 MG tablet Take 80 mg by mouth once daily.    RANEXA 1,000 mg Tb12 Take 1,000 mg by mouth 2 (two) times daily.    RESTASIS 0.05 % ophthalmic emulsion Place 1 drop into both eyes 2 (two) times daily.    solifenacin (VESICARE) 10 MG tablet Take 1 tablet (10 mg total) by mouth once daily.    tamsulosin (FLOMAX) 0.4 mg Cap Take 1 capsule (0.4 mg total) by mouth once daily.    tizanidine (ZANAFLEX) 4 MG tablet ONE TABLET BY MOUTH IN THE EVENING    traZODone (DESYREL) 150 MG tablet Take 150 mg or 300 mg at bedtime for sleep    XARELTO 15 mg Tab ONE TABLET BY MOUTH TWICE DAILY FOR  21 DAYS    [DISCONTINUED] albuterol (PROAIR HFA) 90 mcg/actuation inhaler Inhale 2 puffs into the lungs every 6 (six) hours as needed for Wheezing.     Family History     Problem Relation (Age of Onset)    Heart attack Father    Heart disease Father    Heart failure Father    Hypertension Mother, Father        Tobacco Use    Smoking status: Former Smoker     Packs/day: 0.50     Types: Cigarettes     Last attempt to quit: 9/3/2014     Years since quittin.4    Smokeless tobacco: Former User     Types: Snuff     Quit date: 9/3/2007    Tobacco comment: pt states quit smoking 7 months ago   Substance and Sexual Activity    Alcohol use: No     Alcohol/week: 0.6 - 1.2 oz     Types: 1 - 2 Standard drinks or equivalent per week    Drug use: No    Sexual activity: No     Review of Systems   Constitutional: Negative for appetite change, chills, diaphoresis and fever.   HENT: Negative for congestion, hearing loss, sore throat, tinnitus and trouble swallowing.    Eyes: Negative for photophobia, discharge, itching and visual disturbance.   Respiratory: Positive for shortness of breath. Negative for apnea, cough, wheezing and stridor.    Cardiovascular: Positive for chest pain. Negative for palpitations and leg swelling.   Gastrointestinal: Negative for abdominal distention, abdominal pain, blood in stool, constipation, diarrhea and nausea.   Endocrine: Negative for polydipsia, polyphagia and polyuria.   Genitourinary: Negative for difficulty urinating, dysuria, flank pain and frequency.   Musculoskeletal: Negative for arthralgias, joint swelling and neck stiffness.   Skin: Negative for color change, rash and wound.   Neurological: Negative for dizziness, tremors, seizures, light-headedness, numbness and headaches.   Hematological: Negative for adenopathy.   Psychiatric/Behavioral: Negative for hallucinations and self-injury.     Objective:     Vital Signs (Most Recent):  Temp: 98.6 °F (37 °C) (02/15/19 1143)  Pulse: 81  (02/15/19 1143)  Resp: 18 (02/15/19 1143)  BP: (!) 163/97 (02/15/19 1143)  SpO2: 95 % (02/15/19 1143) Vital Signs (24h Range):  Temp:  [97.7 °F (36.5 °C)-99.5 °F (37.5 °C)] 98.6 °F (37 °C)  Pulse:  [70-97] 81  Resp:  [10-20] 18  SpO2:  [93 %-97 %] 95 %  BP: ()/(54-97) 163/97     Weight: (!) 190 kg (418 lb 14 oz)  Body mass index is 61.86 kg/m².    Physical Exam   Constitutional: He appears well-developed and well-nourished. He is cooperative.   HENT:   Head: Normocephalic and atraumatic.   Eyes: Conjunctivae, EOM and lids are normal. Pupils are equal, round, and reactive to light.   Neck: Normal range of motion and full passive range of motion without pain. Neck supple. No JVD present. No edema present. No thyroid mass present.   Cardiovascular: Normal rate, S1 normal, S2 normal and intact distal pulses.   No murmur heard.  Pulmonary/Chest: Effort normal.   Abdominal: Soft. Bowel sounds are normal. He exhibits no distension and no abdominal bruit. There is no splenomegaly or hepatomegaly. There is no tenderness. There is no CVA tenderness.   Lymphadenopathy:     He has no cervical adenopathy.     He has no axillary adenopathy.   Neurological: He is alert. He has normal reflexes. He displays no tremor. He displays no seizure activity.   Skin: Skin is warm, dry and intact.   Psychiatric: He has a normal mood and affect. His speech is normal. Thought content normal. Cognition and memory are normal.         CRANIAL NERVES     CN III, IV, VI   Pupils are equal, round, and reactive to light.  Extraocular motions are normal.        Significant Labs:   CBC:   Recent Labs   Lab 02/15/19  0203   WBC 10.73   HGB 13.1*   HCT 40.6   *     CMP:   Recent Labs   Lab 02/15/19  0203      K 3.9      CO2 25   *   BUN 17   CREATININE 1.0   CALCIUM 9.1   PROT 7.0   ALBUMIN 3.7   BILITOT 0.7   ALKPHOS 67   AST 21   ALT 39   ANIONGAP 11   EGFRNONAA >60     Cardiac Markers:   Recent Labs   Lab 02/15/19  9731    BNP 15   Troponin:   Recent Labs   Lab 02/15/19  0203 02/15/19  0410 02/15/19  0951   TROPONINI 0.009 <0.006 <0.006     Significant Imaging:   Imaging Results          X-Ray Chest AP Portable (Final result)  Result time 02/15/19 02:06:54    Final result by Rip Orozco MD (02/15/19 02:06:54)                 Impression:      See above.      Electronically signed by: Rip Orozco MD  Date:    02/15/2019  Time:    02:06             Narrative:    EXAMINATION:  XR CHEST AP PORTABLE    CLINICAL HISTORY:  chest pain;    TECHNIQUE:  Single frontal view of the chest was performed.    COMPARISON:  June 2015.    FINDINGS:  Heart is normal in size.  Loop recorder device is seen.  Lungs are symmetrically expanded.  Nonspecific increased attenuation is seen within the right lower lung zone.  Additional small focal opacity is seen within the right midlung zone which is somewhat nodular in configuration.  No evidence of focal consolidation. Findings may reflect infectious or inflammatory process.  Potential pulmonary nodule not excluded.  Future radiograph follow-up may be obtained to ensure resolution versus future nonemergent CT follow-up.  No evidence of pneumothorax or significant effusion.  No acute osseous abnormality identified.

## 2019-02-15 NOTE — HOSPITAL COURSE
Patient started on dual antibiotic therapy to cover him for CAP secondary to abnormal chest x-ray.  Augmentin as part of his dual therapy to cover patient for possible aspiration as he takes multiple sedating medications including his psych meds and narcotics.  His chest pain was atypical mostly epigastric nonradiating, suspect pleuritic in nature secondary to his likely for respiratory infection.  Troponin 1 levels trended negative, Cardiology consulted for opinion given patient's risk factors.  2D echo repeated and shows EF 50%.  He is afebrile without white count, renal functions and liver functions within normal limits, BNP, blood cultures, strep screen, influenza all negative.  Stable for further testing if deemed necessary by his Cardiologist.  Instructed to follow up with his PCP and cardiologist.  Findings and plan discussed with patient, verbalizes agreement and understanding.  Discharged home in stable condition.

## 2019-02-15 NOTE — H&P
[facility] Medicine  History & Physical    Patient Name: Min Nunez  MRN: [id number]  Admission Date: 2/15/2019  Attending Physician: Kiana Bui MD   Primary Care Provider: Boubacar Williamson MD         Patient information was obtained from patient and ER records.     Subjective:     Principal Problem:Chest pain, exertional    Chief Complaint:   Chief Complaint   Patient presents with    Chest Pain     midsternal x 20 minutes, woke pt up out of sleep 10/10, given 2SL nitro and 325 ASA rates pain 5/10        HPI: Min Nuenz is a 58 y.o. WM with PMHx including but not limited to CAD (sees Dr. Flaherty), hypertension, self-reported stroke 2014, and C5-C6 neck pain. Patient presented for evaluation of what he described as sharp sticky like mid sternal to lower sternal chest pain nonradiating (10/10), that awaken him from sleep.  Since that time it feels as if it has dropped a little bit more toward epigastric.  During interview the patient has a hoarse voice states is secondary to recent thyroid surgery.  Additionally the patient reports body aches, and nonproductive cough.  Chest x-ray revealed small focal opacity seen in the right mid lung zone which is somewhat nodular in configuration without evidence of focal consolidation, suggestive of infection versus inflammatory.       Chart review reveal, 1. 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  Unable to be crossed emergently a month ago.  2. Successful recannalization of the RCA with ZOLTAN to the proximal and mid vessel (60mm long).    Workup revealed normal white count afebrile, lytes within normal limits, renal and liver functions within normal limits, serial troponins negative, BNP within normal limits.  Rapid strep and flu negative.      Past Medical History:   Diagnosis Date    Arthritis     Black-out (not amnesia)     Cardiac angina     pt states intermittent since 2014. Regularly sees Dr. Flaherty    Coronary  artery disease     Hearing loss     Hypertension     Neck problem     problems C5-6-pain radiates to both arms and down spine    Stroke     mini  stroke end of 2014 or beginning of 2015       Past Surgical History:   Procedure Laterality Date    CORONARY ANGIOPLASTY      x 3 angiograms 2014. 1st angio no stents place. 2nd Angio x2 stents placed., 3rd angio f/u to check coronary arteries again. no intervention taht last time    CORONARY STENT PLACEMENT      x 2 stents    CYST REMOVAL Left     posterior wrist    EPIDIDYMECTOMY Bilateral 8/5/2016    Performed by MARIA DE JESUS Hendrickson [facility] at [facility] OR    INSERTION, STENT, CORONARY ARTERY N/A 4/23/2014    Performed by Joshua Murillo MD at [facility] CATH LAB    neck surgery 2018      testicular surgery 2016         Review of patient's allergies indicates:  No Known Allergies    No current facility-administered medications on file prior to encounter.      Current Outpatient Medications on File Prior to Encounter   Medication Sig    ALPRAZolam (XANAX) 0.5 MG tablet Take 1 tablet (0.5 mg total) by mouth 2 (two) times daily as needed for Anxiety.    busPIRone (BUSPAR) 15 MG tablet Take 1 tablet (15 mg total) by mouth 3 (three) times daily.    chlorhexidine (PERIDEX) 0.12 % solution swish AND SPIT FIFTEEN MILLILITERS BY MOUTH TWICE DAILY    citalopram (CELEXA) 40 MG tablet Take 1 tablet (40 mg total) by mouth once daily.    divalproex (DEPAKOTE) 250 MG EC tablet 1 pill in morning, 1 pill in evening (Patient taking differently: Take 500 mg by mouth 2 (two) times daily. )    fenofibrate 160 MG Tab Take 160 mg by mouth once daily.    gabapentin (NEURONTIN) 300 MG capsule Take 300 mg by mouth once daily.    hydrOXYzine pamoate (VISTARIL) 25 MG Cap 1 pill 3x a day for 2 days, rest left over every 8 hours as needed for migraine (Patient taking differently: Take 25 mg by mouth once daily. 1 pill 3x a day for 2 days, rest left over every 8 hours as needed for migraine)     irbesartan (AVAPRO) 300 MG tablet Take 300 mg by mouth once daily.    isosorbide mononitrate (IMDUR) 60 MG 24 hr tablet Take 60 mg by mouth every morning.    lidocaine HCl 3 % Crea APPLY 2 grams to the painful area THREE TIMES A DAY    meloxicam (MOBIC) 7.5 MG tablet Take 7.5 mg by mouth 2 (two) times daily.     metoprolol succinate (TOPROL-XL) 25 MG 24 hr tablet Take 25 mg by mouth 2 (two) times daily.     midodrine (PROAMATINE) 2.5 MG Tab Take 2.5 mg by mouth 2 (two) times daily with meals.     mirabegron (MYRBETRIQ) 25 mg Tb24 ER tablet Take 1 tablet (25 mg total) by mouth once daily.    mupirocin (BACTROBAN) 2 % ointment 2 (two) times daily.    nitroGLYCERIN (NITROSTAT) 0.4 MG SL tablet Place 0.4 mg under the tongue every 5 (five) minutes as needed for Chest pain.    NON FORMULARY MEDICATION Apply topically 4 (four) times daily as needed. Gabapentin 5%, Orphenadrine 2%, Lidocaine 2%, Prilocaine 2%    omega-3 acid ethyl esters (LOVAZA) 1 gram capsule Take 2 capsules by mouth 2 (two) times daily.    oxycodone-acetaminophen (PERCOCET)  mg per tablet Take 1 tablet by mouth every 4 (four) hours as needed for Pain.    pravastatin (PRAVACHOL) 80 MG tablet Take 80 mg by mouth once daily.    RANEXA 1,000 mg Tb12 Take 1,000 mg by mouth 2 (two) times daily.    RESTASIS 0.05 % ophthalmic emulsion Place 1 drop into both eyes 2 (two) times daily.    solifenacin (VESICARE) 10 MG tablet Take 1 tablet (10 mg total) by mouth once daily.    tamsulosin (FLOMAX) 0.4 mg Cap Take 1 capsule (0.4 mg total) by mouth once daily.    tizanidine (ZANAFLEX) 4 MG tablet ONE TABLET BY MOUTH IN THE EVENING    traZODone (DESYREL) 150 MG tablet Take 150 mg or 300 mg at bedtime for sleep    XARELTO 15 mg Tab ONE TABLET BY MOUTH TWICE DAILY FOR 21 DAYS    [DISCONTINUED] albuterol (PROAIR HFA) 90 mcg/actuation inhaler Inhale 2 puffs into the lungs every 6 (six) hours as needed for Wheezing.     Family History     Problem  Relation (Age of Onset)    Heart attack Father    Heart disease Father    Heart failure Father    Hypertension Mother, Father        Tobacco Use    Smoking status: Former Smoker     Packs/day: 0.50     Types: Cigarettes     Last attempt to quit: 9/3/2014     Years since quittin.4    Smokeless tobacco: Former User     Types: Snuff     Quit date: 9/3/2007    Tobacco comment: pt states quit smoking 7 months ago   Substance and Sexual Activity    Alcohol use: No     Alcohol/week: 0.6 - 1.2 oz     Types: 1 - 2 Standard drinks or equivalent per week    Drug use: No    Sexual activity: No     Review of Systems   Constitutional: Negative for appetite change, chills, diaphoresis and fever.   HENT: Negative for congestion, hearing loss, sore throat, tinnitus and trouble swallowing.    Eyes: Negative for photophobia, discharge, itching and visual disturbance.   Respiratory: Positive for shortness of breath. Negative for apnea, cough, wheezing and stridor.    Cardiovascular: Positive for chest pain. Negative for palpitations and leg swelling.   Gastrointestinal: Negative for abdominal distention, abdominal pain, blood in stool, constipation, diarrhea and nausea.   Endocrine: Negative for polydipsia, polyphagia and polyuria.   Genitourinary: Negative for difficulty urinating, dysuria, flank pain and frequency.   Musculoskeletal: Negative for arthralgias, joint swelling and neck stiffness.   Skin: Negative for color change, rash and wound.   Neurological: Negative for dizziness, tremors, seizures, light-headedness, numbness and headaches.   Hematological: Negative for adenopathy.   Psychiatric/Behavioral: Negative for hallucinations and self-injury.     Objective:     Vital Signs (Most Recent):  Temp: 98.6 °F (37 °C) (02/15/19 1143)  Pulse: 81 (02/15/19 1143)  Resp: 18 (02/15/19 1143)  BP: (!) 163/97 (02/15/19 1143)  SpO2: 95 % (02/15/19 1143) Vital Signs (24h Range):  Temp:  [97.7 °F (36.5 °C)-99.5 °F (37.5 °C)] 98.6  °F (37 °C)  Pulse:  [70-97] 81  Resp:  [10-20] 18  SpO2:  [93 %-97 %] 95 %  BP: ()/(54-97) 163/97     Weight: (!) 190 kg (418 lb 14 oz)  Body mass index is 61.86 kg/m².    Physical Exam   Constitutional: He appears well-developed and well-nourished. He is cooperative.   HENT:   Head: Normocephalic and atraumatic.   Eyes: Conjunctivae, EOM and lids are normal. Pupils are equal, round, and reactive to light.   Neck: Normal range of motion and full passive range of motion without pain. Neck supple. No JVD present. No edema present. No thyroid mass present.   Cardiovascular: Normal rate, S1 normal, S2 normal and intact distal pulses.   No murmur heard.  Pulmonary/Chest: Effort normal.   Abdominal: Soft. Bowel sounds are normal. He exhibits no distension and no abdominal bruit. There is no splenomegaly or hepatomegaly. There is no tenderness. There is no CVA tenderness.   Lymphadenopathy:     He has no cervical adenopathy.     He has no axillary adenopathy.   Neurological: He is alert. He has normal reflexes. He displays no tremor. He displays no seizure activity.   Skin: Skin is warm, dry and intact.   Psychiatric: He has a normal mood and affect. His speech is normal. Thought content normal. Cognition and memory are normal.         CRANIAL NERVES     CN III, IV, VI   Pupils are equal, round, and reactive to light.  Extraocular motions are normal.        Significant Labs:   CBC:   Recent Labs   Lab 02/15/19  0203   WBC 10.73   HGB 13.1*   HCT 40.6   *     CMP:   Recent Labs   Lab 02/15/19  0203      K 3.9      CO2 25   *   BUN 17   CREATININE 1.0   CALCIUM 9.1   PROT 7.0   ALBUMIN 3.7   BILITOT 0.7   ALKPHOS 67   AST 21   ALT 39   ANIONGAP 11   EGFRNONAA >60     Cardiac Markers:   Recent Labs   Lab 02/15/19  0203   BNP 15   Troponin:   Recent Labs   Lab 02/15/19  0203 02/15/19  0410 02/15/19  0951   TROPONINI 0.009 <0.006 <0.006     Significant Imaging:   Imaging Results          X-Ray  Chest AP Portable (Final result)  Result time 02/15/19 02:06:54    Final result by Rip Orozco MD (02/15/19 02:06:54)                 Impression:      See above.      Electronically signed by: Rip Orozco MD  Date:    02/15/2019  Time:    02:06             Narrative:    EXAMINATION:  XR CHEST AP PORTABLE    CLINICAL HISTORY:  chest pain;    TECHNIQUE:  Single frontal view of the chest was performed.    COMPARISON:  June 2015.    FINDINGS:  Heart is normal in size.  Loop recorder device is seen.  Lungs are symmetrically expanded.  Nonspecific increased attenuation is seen within the right lower lung zone.  Additional small focal opacity is seen within the right midlung zone which is somewhat nodular in configuration.  No evidence of focal consolidation. Findings may reflect infectious or inflammatory process.  Potential pulmonary nodule not excluded.  Future radiograph follow-up may be obtained to ensure resolution versus future nonemergent CT follow-up.  No evidence of pneumothorax or significant effusion.  No acute osseous abnormality identified.                                  Assessment/Plan:     * Chest pain, exertional    Atypical sticky sharp mid-->lower sternal non-radiating chest pain with normal troponin 1 level thus far and normal bnp. Patient reports history of stent?? Continuous cardiac monitoring - unclear etiology however cannot rule out cardiac etiology considering patient's extensive cardiac history- -his LVEF was 70% in 2015, and had abnormal stress at that time  -- will continue to trend trops however, likely more consistent with his back pain and neck pain moreso than chest --  Continue to trend CE Q8 hrs X3 sets (1/3 neg)  ASA  81 mg PO Daily  Nitro SL prn CP  Supplemental oxygen at 2L NC PRN SOB  2D Echo  UA, TSH, lipid panel  Defer narcotics - patient recently ordered 135 pills on 01/18/2019       Pneumonia of right lower lobe due to infectious organism    Chest x-ray suggestive of  inflammation versus infectious process in the right lower lung zone.  Started on dual antibiotics for CAP, however will continue macrolide and had Augmentin for cross coverage of aspiration given the amount of psych, narcotic, and benzodiazepine treatments ordered for patient.  Wheezy in a prescription monitoring notes 135 Percocet pills ordered on 01/19/2019, 30 Xanax around 01/23/2019, Fioricet 30 around the same time.  Patient also prescribed BuSpar, Celexa, Depakote, gabapentin, hydroxyzine.  He is requesting Percocet neck pain.  -continue Zithromax plus Augmentin  -troponins trended negative, atypical chest pain, will defer to Cardiology for pin  -he is afebrile without white count and otherwise stable looking and nontoxic appearing, he can likely discharge home after seen by Cardiology     Anxiety    Anxiety likely contributory to patient's symptom.  Restarted Xanax 0.5 while in the hospital, he can continue this dose when he discharges but recommend weaning.  He does see psych outpatient, will defer       Hypertension    Patient's blood pressure at the time of admission was 114/72.  This is good control continue home meds     Mixed hyperlipidemia    Takes fenofibrate 160 at home continue this dose  The PICC panel         VTE Risk Mitigation (From admission, onward)        Ordered     enoxaparin injection 40 mg  Daily      02/15/19 0815           TREMAYNE Del Cid, ELDAP-C  Hospitalist - Department of Hospital Medicine  [address], [place] [address]  Office [phone]; Pager [phone]

## 2019-02-15 NOTE — ASSESSMENT & PLAN NOTE
Patient's blood pressure at the time of admission was 114/72.  This is good control continue home meds

## 2019-02-15 NOTE — HPI
58-year-old male with a past medical history of heart attacks with heart stents, cholesterol, hypertension, stroke with residual slight facial droop presenting today secondary to 10 at 10 chest pain that resolved down to 5/5 chest pain on arrival and states that it is moving down with those interventions of aspirin and nitro.  States that he had been having a fever yesterday (101.8) and the day before and having some body aches on review of systems.  Some cough.  No increased frequency of urination or burning on urination.  No change in bowel movements.  No rashes or lesions noted. No recent travel.  No unilateral leg swelling or leg swelling in general.  States that his throat is a little bit on the sore side.  No other complaints at this particular time.  He was supposed to go to [facility] but due to them being on diversion he got diverted to here.  Patient states this been years since he has had a stress test, echo, or heart catheterization.  Last admission 6 months ago    Follows with Dr. Flaherty in clinic.  Says he has an appointment this month to go see him.  He has ruled out for ACS and echo shows low-normal function.  He denies any chest pain currently.  He says is somewhat dissimilar to his previous episodes of angina but it was so sharp and severe that he was scared he was having a heart attack.  He denies any other associated symptoms.  He did not have any relief with nitroglycerin.  He denies any PND, orthopnea or lower extremity edema.  He has not had any dizziness, presyncope or syncope.

## 2019-02-15 NOTE — ASSESSMENT & PLAN NOTE
Chest x-ray suggestive of inflammation versus infectious process in the right lower lung zone.  Started on dual antibiotics for CAP, however will continue macrolide and had Augmentin for cross coverage of aspiration given the amount of psych, narcotic, and benzodiazepine treatments ordered for patient.  Wheezy in a prescription monitoring notes 135 Percocet pills ordered on 01/19/2019, 30 Xanax around 01/23/2019, Fioricet 30 around the same time.  Patient also prescribed BuSpar, Celexa, Depakote, gabapentin, hydroxyzine.  He is requesting Percocet neck pain.  -continue Zithromax plus Augmentin  -troponins trended negative, atypical chest pain, will defer to Cardiology for pin  -he is afebrile without white count and otherwise stable looking and nontoxic appearing, he can likely discharge home after seen by Cardiology

## 2019-02-15 NOTE — ASSESSMENT & PLAN NOTE
Atypical sticky sharp mid-->lower sternal non-radiating chest pain with normal troponin 1 level thus far and normal bnp. Patient reports history of stent?? Continuous cardiac monitoring - unclear etiology however cannot rule out cardiac etiology considering patient's extensive cardiac history- -his LVEF was 70% in 2015, and had abnormal stress at that time  -- will continue to trend trops however, likely more consistent with his back pain and neck pain moreso than chest --  Continue to trend CE Q8 hrs X3 sets (1/3 neg)  ASA  81 mg PO Daily  Nitro SL prn CP  Supplemental oxygen at 2L NC PRN SOB  2D Echo  UA, TSH, lipid panel  Defer narcotics - patient recently ordered 135 pills on 01/18/2019

## 2019-02-15 NOTE — SUBJECTIVE & OBJECTIVE
Past Medical History:   Diagnosis Date    Arthritis     Black-out (not amnesia)     Cardiac angina     pt states intermittent since 2014. Regularly sees Dr. Flaherty    Coronary artery disease     Hearing loss     Hypertension     Neck problem     problems C5-6-pain radiates to both arms and down spine    Stroke     mini  stroke end of 2014 or beginning of 2015       Past Surgical History:   Procedure Laterality Date    CORONARY ANGIOPLASTY      x 3 angiograms 2014. 1st angio no stents place. 2nd Angio x2 stents placed., 3rd angio f/u to check coronary arteries again. no intervention taht last time    CORONARY STENT PLACEMENT      x 2 stents    CYST REMOVAL Left     posterior wrist    EPIDIDYMECTOMY Bilateral 8/5/2016    Performed by MARIA DE JESUS Hendrickson [facility] at [facility] OR    INSERTION, STENT, CORONARY ARTERY N/A 4/23/2014    Performed by Joshua Murillo MD at [facility] CATH LAB    neck surgery 2018      testicular surgery 2016         Review of patient's allergies indicates:  No Known Allergies    No current facility-administered medications on file prior to encounter.      Current Outpatient Medications on File Prior to Encounter   Medication Sig    ALPRAZolam (XANAX) 0.5 MG tablet Take 1 tablet (0.5 mg total) by mouth 2 (two) times daily as needed for Anxiety.    busPIRone (BUSPAR) 15 MG tablet Take 1 tablet (15 mg total) by mouth 3 (three) times daily.    chlorhexidine (PERIDEX) 0.12 % solution swish AND SPIT FIFTEEN MILLILITERS BY MOUTH TWICE DAILY    citalopram (CELEXA) 40 MG tablet Take 1 tablet (40 mg total) by mouth once daily.    divalproex (DEPAKOTE) 250 MG EC tablet 1 pill in morning, 1 pill in evening (Patient taking differently: Take 500 mg by mouth 2 (two) times daily. )    fenofibrate 160 MG Tab Take 160 mg by mouth once daily.    gabapentin (NEURONTIN) 300 MG capsule Take 300 mg by mouth once daily.    hydrOXYzine pamoate (VISTARIL) 25 MG Cap 1 pill 3x a day for 2 days, rest left over  every 8 hours as needed for migraine (Patient taking differently: Take 25 mg by mouth once daily. 1 pill 3x a day for 2 days, rest left over every 8 hours as needed for migraine)    irbesartan (AVAPRO) 300 MG tablet Take 300 mg by mouth once daily.    isosorbide mononitrate (IMDUR) 60 MG 24 hr tablet Take 60 mg by mouth every morning.    lidocaine HCl 3 % Crea APPLY 2 grams to the painful area THREE TIMES A DAY    meloxicam (MOBIC) 7.5 MG tablet Take 7.5 mg by mouth 2 (two) times daily.     metoprolol succinate (TOPROL-XL) 25 MG 24 hr tablet Take 25 mg by mouth 2 (two) times daily.     midodrine (PROAMATINE) 2.5 MG Tab Take 2.5 mg by mouth 2 (two) times daily with meals.     mirabegron (MYRBETRIQ) 25 mg Tb24 ER tablet Take 1 tablet (25 mg total) by mouth once daily.    mupirocin (BACTROBAN) 2 % ointment 2 (two) times daily.    nitroGLYCERIN (NITROSTAT) 0.4 MG SL tablet Place 0.4 mg under the tongue every 5 (five) minutes as needed for Chest pain.    NON FORMULARY MEDICATION Apply topically 4 (four) times daily as needed. Gabapentin 5%, Orphenadrine 2%, Lidocaine 2%, Prilocaine 2%    omega-3 acid ethyl esters (LOVAZA) 1 gram capsule Take 2 capsules by mouth 2 (two) times daily.    oxycodone-acetaminophen (PERCOCET)  mg per tablet Take 1 tablet by mouth every 4 (four) hours as needed for Pain.    pravastatin (PRAVACHOL) 80 MG tablet Take 80 mg by mouth once daily.    RANEXA 1,000 mg Tb12 Take 1,000 mg by mouth 2 (two) times daily.    RESTASIS 0.05 % ophthalmic emulsion Place 1 drop into both eyes 2 (two) times daily.    solifenacin (VESICARE) 10 MG tablet Take 1 tablet (10 mg total) by mouth once daily.    tamsulosin (FLOMAX) 0.4 mg Cap Take 1 capsule (0.4 mg total) by mouth once daily.    tizanidine (ZANAFLEX) 4 MG tablet ONE TABLET BY MOUTH IN THE EVENING    traZODone (DESYREL) 150 MG tablet Take 150 mg or 300 mg at bedtime for sleep    XARELTO 15 mg Tab ONE TABLET BY MOUTH TWICE DAILY FOR  21 DAYS    [DISCONTINUED] albuterol (PROAIR HFA) 90 mcg/actuation inhaler Inhale 2 puffs into the lungs every 6 (six) hours as needed for Wheezing.     Family History     Problem Relation (Age of Onset)    Heart attack Father    Heart disease Father    Heart failure Father    Hypertension Mother, Father        Tobacco Use    Smoking status: Former Smoker     Packs/day: 0.50     Types: Cigarettes     Last attempt to quit: 9/3/2014     Years since quittin.4    Smokeless tobacco: Former User     Types: Snuff     Quit date: 9/3/2007    Tobacco comment: pt states quit smoking 7 months ago   Substance and Sexual Activity    Alcohol use: No     Alcohol/week: 0.6 - 1.2 oz     Types: 1 - 2 Standard drinks or equivalent per week    Drug use: No    Sexual activity: No     Review of Systems   Constitution: Negative.   HENT: Negative.    Eyes: Negative.    Cardiovascular: Positive for chest pain and dyspnea on exertion. Negative for irregular heartbeat, leg swelling, near-syncope, orthopnea, palpitations, paroxysmal nocturnal dyspnea and syncope.   Respiratory: Positive for cough and shortness of breath.    Skin: Negative.    Musculoskeletal: Negative.    Gastrointestinal: Negative for abdominal pain, constipation and diarrhea.   Genitourinary: Negative for dysuria.   Neurological: Negative for dizziness.   Psychiatric/Behavioral: Negative.      Objective:     Vital Signs (Most Recent):  Temp: 97.7 °F (36.5 °C) (02/15/19 0814)  Pulse: 74 (02/15/19 0814)  Resp: 16 (02/15/19 0814)  BP: 133/86 (02/15/19 0814)  SpO2: 96 % (02/15/19 0814) Vital Signs (24h Range):  Temp:  [97.7 °F (36.5 °C)-99.5 °F (37.5 °C)] 97.7 °F (36.5 °C)  Pulse:  [71-97] 74  Resp:  [10-20] 16  SpO2:  [93 %-97 %] 96 %  BP: ()/(54-86) 133/86     Weight: (!) 190 kg (418 lb 14 oz)  Body mass index is 61.86 kg/m².    SpO2: 96 %  O2 Device (Oxygen Therapy): room air      Intake/Output Summary (Last 24 hours) at 2/15/2019 0842  Last data filed at  2/15/2019 0532  Gross per 24 hour   Intake 50 ml   Output --   Net 50 ml       Lines/Drains/Airways     Peripheral Intravenous Line                 Peripheral IV - Single Lumen 02/15/19 0134 Right Antecubital less than 1 day                Physical Exam   Constitutional: He is oriented to person, place, and time. He appears well-developed and well-nourished. No distress.   HENT:   Head: Normocephalic and atraumatic.   Eyes: Conjunctivae and EOM are normal. Pupils are equal, round, and reactive to light.   Neck: Normal range of motion. Neck supple. No thyromegaly present.   Cardiovascular: Normal rate, regular rhythm and normal heart sounds.   No murmur heard.  Pulmonary/Chest: Effort normal and breath sounds normal. No respiratory distress. He has no wheezes. He has no rales. He exhibits no tenderness.   Course decreased breath sounds  Reproducible chest pain which is worse with respiration   Abdominal: Soft. Bowel sounds are normal.   Musculoskeletal: He exhibits no edema.   Neurological: He is alert and oriented to person, place, and time.   Skin: Skin is warm and dry.   Psychiatric: He has a normal mood and affect. His behavior is normal.       Significant Labs:   CMP   Recent Labs   Lab 02/15/19  0203      K 3.9      CO2 25   *   BUN 17   CREATININE 1.0   CALCIUM 9.1   PROT 7.0   ALBUMIN 3.7   BILITOT 0.7   ALKPHOS 67   AST 21   ALT 39   ANIONGAP 11   ESTGFRAFRICA >60   EGFRNONAA >60   , CBC   Recent Labs   Lab 02/15/19  0203   WBC 10.73   HGB 13.1*   HCT 40.6   *   , INR No results for input(s): INR, PROTIME in the last 48 hours., Lipid Panel No results for input(s): CHOL, HDL, LDLCALC, TRIG, CHOLHDL in the last 48 hours. and Troponin   Recent Labs   Lab 02/15/19  0203 02/15/19  0410   TROPONINI 0.009 <0.006       Significant Imaging: EKG: Normal sinus rhythm with nonspecific ST-T changes (* personally reviewed and interpreted)    2014 Heart Cath  C. SUMMARY:     1. S/P Inferior Wall  MI with  of the RCA.  Unable to be crossed emergently a month ago.  2. Successful recannalization of the RCA with ZOLTAN to the proximal and mid vessel (60mm long).     D. RECOMMENDATIONS:     1. 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  2. Cardiac rehab.  3. F/u [facility] Dr. Watkins for general cardiology and cardiac rehab.    PET stress:  2015  CONCLUSIONS: ABNORMAL MYOCARDIAL PERFUSION PET STRESS TEST  1. There is a very small sized mild intensity fixed defect consistent with non-transmural infarct in the inferoapical wall. This defect comprises 2 % of the left ventricular myocardium.  Absolute flow and CFR within this defect is consistent with patency   of the RCA.  2. There is a small sized mild reversible defect in the base to mid inferolateral wall in the usual distribution of the OM2. This defect comprises 10 % of the left ventricular myocardium.  Absolute flow within this defect is only mildly reduced and   typically not low enough to cause clinical signs of ischemia.  However, relative CFR, the non-invasive surrogate of FFR, is reduced in this defect consistent with discrete stenosis of OM2.   3. Resting wall motion is physiologic. Stress wall motion is physiologic.   4. Resting LV function is normal.  (normal is >= 51%)Visually estimated LV function is normal at stress.   5. The ventricular volumes are normal at rest and stress.   6. The extracardiac distribution of radioactivity is normal.

## 2019-02-15 NOTE — ASSESSMENT & PLAN NOTE
Atypical sticky sharp mid-->lower sternal non-radiating chest pain with normal troponin 1 level thus far and normal bnp. Patient reports history of stent?? Continuous cardiac monitoring - unclear etiology however cannot rule out cardiac etiology considering patient's extensive cardiac history- will continue to trend trops however, likely more consistent with his back pain and neck pain moreso than chest ---  Continue to trend CE Q8 hrs X3 sets (1/3 neg)  ASA  81 mg PO Daily  Nitro SL prn CP  Supplemental oxygen at 2L NC PRN SOB  2D Echo  UA, TSH, lipid panel  Defer narcotics - patient recently ordered 135 pills on 01/18/2019

## 2019-02-15 NOTE — HPI
Min Nunez is a 58 y.o. WM with PMHx including but not limited to CAD (sees Dr. Flaherty), hypertension, self-reported stroke 2014, and C5-C6 neck pain. Patient presented for evaluation of what he described as sharp sticky like mid sternal to lower sternal chest pain nonradiating (10/10), that awaken him from sleep.  Since that time it feels as if it has dropped a little bit more toward epigastric.  During interview the patient has a hoarse voice states is secondary to recent thyroid surgery.  Additionally the patient reports body aches, and nonproductive cough.  Chest x-ray revealed small focal opacity seen in the right mid lung zone which is somewhat nodular in configuration without evidence of focal consolidation, suggestive of infection versus inflammatory.       Chart review reveal, 1. 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  Unable to be crossed emergently a month ago.  2. Successful recannalization of the RCA with ZOLTAN to the proximal and mid vessel (60mm long).    Workup revealed normal white count afebrile, lytes within normal limits, renal and liver functions within normal limits, serial troponins negative, BNP within normal limits.  Rapid strep and flu negative.

## 2019-02-15 NOTE — ASSESSMENT & PLAN NOTE
No new signs of ACS  Symptoms currently appear more pleuritic in nature  Consider ischemic workup when stable, okay as an outpatient

## 2019-02-15 NOTE — ED TRIAGE NOTES
Chest Pain: Patient complains of chest pain. Onset was 1 hour ago, with worsening course since that time. The patient describes the pain as intermittent, dull and sharp in nature, does not radiate. Patient rates pain as a 8/10 in intensity.  Associated symptoms are chest pain and chest pressure/discomfort.

## 2019-02-16 NOTE — NURSING TRANSFER
Nursing Transfer Note      2/15/2019     Transfer From: ED    Transfer via stretcher    Transfer with cardiac monitoring    Transported by Transport    Medicines sent: no    Chart send with patient: Yes    Notified: Mesha Francois NP    Patient reassessed at: 2/15/2019, 0810    Upon arrival to floor: cardiac monitor applied, patient oriented to room, call bell in reach and bed in lowest position. Patient ambulated from stretcher to bed without difficulty. Patient denies any complaints or chest pain at this time. No SOB noted after ambulating. Admission completed. Home Medications reviewed. POC of reviewed. Will continue to monitor.

## 2019-02-16 NOTE — NURSING
Patient reported feeling anxious, request his home medication of xanax. Patient medicated per PRN orders. Will continue to monitor.

## 2019-02-16 NOTE — NURSING
Reviewed all discharge instructions with patient including, new medications, continued medications,  follow-up appointments and signs/symptoms to report to PCP or seek emergency medical care. Patient verbalized understanding to all instructions and education. Patient denies any complaints or concerns at this time. Awaiting transport.

## 2019-02-16 NOTE — CONSULTS
[facility]  Cardiology  Consult Note    Patient Name: Min Nunez  MRN: [id number]  Admission Date: 2/15/2019  Hospital Length of Stay: 0 days  Code Status: Full Code   Attending Provider: No att. providers found   Consulting Provider: Jorge Suazo MD  Primary Care Physician: Boubacar Williamson MD  Principal Problem:Chest pain, exertional    Patient information was obtained from patient, past medical records and ER records.     Inpatient consult to Cardiology  Consult performed by: Jorge Suazo MD  Consult ordered by: PARISA Del Cid  Reason for consult: Chest pain        Subjective:     Chief Complaint:  Chest pain     HPI:   58-year-old male with a past medical history of heart attacks with heart stents, cholesterol, hypertension, stroke with residual slight facial droop presenting today secondary to 10 at 10 chest pain that resolved down to 5/5 chest pain on arrival and states that it is moving down with those interventions of aspirin and nitro.  States that he had been having a fever yesterday (101.8) and the day before and having some body aches on review of systems.  Some cough.  No increased frequency of urination or burning on urination.  No change in bowel movements.  No rashes or lesions noted. No recent travel.  No unilateral leg swelling or leg swelling in general.  States that his throat is a little bit on the sore side.  No other complaints at this particular time.  He was supposed to go to [facility] but due to them being on diversion he got diverted to here.  Patient states this been years since he has had a stress test, echo, or heart catheterization.  Last admission 6 months ago    Follows with Dr. Flaherty in clinic.  Says he has an appointment this month to go see him.  He has ruled out for ACS and echo shows low-normal function.  He denies any chest pain currently.  He says is somewhat dissimilar to his previous episodes of angina but it was so sharp and  severe that he was scared he was having a heart attack.  He denies any other associated symptoms.  He did not have any relief with nitroglycerin.  He denies any PND, orthopnea or lower extremity edema.  He has not had any dizziness, presyncope or syncope.    Past Medical History:   Diagnosis Date    Arthritis     Black-out (not amnesia)     Cardiac angina     pt states intermittent since 2014. Regularly sees Dr. Flaherty    Coronary artery disease     Hearing loss     Hypertension     Neck problem     problems C5-6-pain radiates to both arms and down spine    Stroke     mini  stroke end of 2014 or beginning of 2015       Past Surgical History:   Procedure Laterality Date    CORONARY ANGIOPLASTY      x 3 angiograms 2014. 1st angio no stents place. 2nd Angio x2 stents placed., 3rd angio f/u to check coronary arteries again. no intervention taht last time    CORONARY STENT PLACEMENT      x 2 stents    CYST REMOVAL Left     posterior wrist    EPIDIDYMECTOMY Bilateral 8/5/2016    Performed by MARIA DE JESUS Hendrickson [facility] at [facility] OR    INSERTION, STENT, CORONARY ARTERY N/A 4/23/2014    Performed by [facility]illo [facility] at [facility] CATH LAB    neck surgery 2018      testicular surgery 2016         Review of patient's allergies indicates:  No Known Allergies    No current facility-administered medications on file prior to encounter.      Current Outpatient Medications on File Prior to Encounter   Medication Sig    ALPRAZolam (XANAX) 0.5 MG tablet Take 1 tablet (0.5 mg total) by mouth 2 (two) times daily as needed for Anxiety.    busPIRone (BUSPAR) 15 MG tablet Take 1 tablet (15 mg total) by mouth 3 (three) times daily.    chlorhexidine (PERIDEX) 0.12 % solution swish AND SPIT FIFTEEN MILLILITERS BY MOUTH TWICE DAILY    citalopram (CELEXA) 40 MG tablet Take 1 tablet (40 mg total) by mouth once daily.    divalproex (DEPAKOTE) 250 MG EC tablet 1 pill in morning, 1 pill in evening (Patient taking differently: Take 500  mg by mouth 2 (two) times daily. )    fenofibrate 160 MG Tab Take 160 mg by mouth once daily.    gabapentin (NEURONTIN) 300 MG capsule Take 300 mg by mouth once daily.    hydrOXYzine pamoate (VISTARIL) 25 MG Cap 1 pill 3x a day for 2 days, rest left over every 8 hours as needed for migraine (Patient taking differently: Take 25 mg by mouth once daily. 1 pill 3x a day for 2 days, rest left over every 8 hours as needed for migraine)    irbesartan (AVAPRO) 300 MG tablet Take 300 mg by mouth once daily.    isosorbide mononitrate (IMDUR) 60 MG 24 hr tablet Take 60 mg by mouth every morning.    lidocaine HCl 3 % Crea APPLY 2 grams to the painful area THREE TIMES A DAY    meloxicam (MOBIC) 7.5 MG tablet Take 7.5 mg by mouth 2 (two) times daily.     metoprolol succinate (TOPROL-XL) 25 MG 24 hr tablet Take 25 mg by mouth 2 (two) times daily.     midodrine (PROAMATINE) 2.5 MG Tab Take 2.5 mg by mouth 2 (two) times daily with meals.     mirabegron (MYRBETRIQ) 25 mg Tb24 ER tablet Take 1 tablet (25 mg total) by mouth once daily.    mupirocin (BACTROBAN) 2 % ointment 2 (two) times daily.    nitroGLYCERIN (NITROSTAT) 0.4 MG SL tablet Place 0.4 mg under the tongue every 5 (five) minutes as needed for Chest pain.    NON FORMULARY MEDICATION Apply topically 4 (four) times daily as needed. Gabapentin 5%, Orphenadrine 2%, Lidocaine 2%, Prilocaine 2%    omega-3 acid ethyl esters (LOVAZA) 1 gram capsule Take 2 capsules by mouth 2 (two) times daily.    oxycodone-acetaminophen (PERCOCET)  mg per tablet Take 1 tablet by mouth every 4 (four) hours as needed for Pain.    pravastatin (PRAVACHOL) 80 MG tablet Take 80 mg by mouth once daily.    RANEXA 1,000 mg Tb12 Take 1,000 mg by mouth 2 (two) times daily.    RESTASIS 0.05 % ophthalmic emulsion Place 1 drop into both eyes 2 (two) times daily.    solifenacin (VESICARE) 10 MG tablet Take 1 tablet (10 mg total) by mouth once daily.    tamsulosin (FLOMAX) 0.4 mg Cap Take  1 capsule (0.4 mg total) by mouth once daily.    tizanidine (ZANAFLEX) 4 MG tablet ONE TABLET BY MOUTH IN THE EVENING    traZODone (DESYREL) 150 MG tablet Take 150 mg or 300 mg at bedtime for sleep    XARELTO 15 mg Tab ONE TABLET BY MOUTH TWICE DAILY FOR 21 DAYS    [DISCONTINUED] albuterol (PROAIR HFA) 90 mcg/actuation inhaler Inhale 2 puffs into the lungs every 6 (six) hours as needed for Wheezing.     Family History     Problem Relation (Age of Onset)    Heart attack Father    Heart disease Father    Heart failure Father    Hypertension Mother, Father        Tobacco Use    Smoking status: Former Smoker     Packs/day: 0.50     Types: Cigarettes     Last attempt to quit: 9/3/2014     Years since quittin.4    Smokeless tobacco: Former User     Types: Snuff     Quit date: 9/3/2007    Tobacco comment: pt states quit smoking 7 months ago   Substance and Sexual Activity    Alcohol use: No     Alcohol/week: 0.6 - 1.2 oz     Types: 1 - 2 Standard drinks or equivalent per week    Drug use: No    Sexual activity: No     Review of Systems   Constitution: Negative.   HENT: Negative.    Eyes: Negative.    Cardiovascular: Positive for chest pain and dyspnea on exertion. Negative for irregular heartbeat, leg swelling, near-syncope, orthopnea, palpitations, paroxysmal nocturnal dyspnea and syncope.   Respiratory: Positive for cough and shortness of breath.    Skin: Negative.    Musculoskeletal: Negative.    Gastrointestinal: Negative for abdominal pain, constipation and diarrhea.   Genitourinary: Negative for dysuria.   Neurological: Negative for dizziness.   Psychiatric/Behavioral: Negative.      Objective:     Vital Signs (Most Recent):  Temp: 97.7 °F (36.5 °C) (02/15/19 0814)  Pulse: 74 (02/15/19 0814)  Resp: 16 (02/15/19 0814)  BP: 133/86 (02/15/19 0814)  SpO2: 96 % (02/15/19 0814) Vital Signs (24h Range):  Temp:  [97.7 °F (36.5 °C)-99.5 °F (37.5 °C)] 97.7 °F (36.5 °C)  Pulse:  [71-97] 74  Resp:  [10-20]  16  SpO2:  [93 %-97 %] 96 %  BP: ()/(54-86) 133/86     Weight: (!) 190 kg (418 lb 14 oz)  Body mass index is 61.86 kg/m².    SpO2: 96 %  O2 Device (Oxygen Therapy): room air      Intake/Output Summary (Last 24 hours) at 2/15/2019 0842  Last data filed at 2/15/2019 0532  Gross per 24 hour   Intake 50 ml   Output --   Net 50 ml       Lines/Drains/Airways     Peripheral Intravenous Line                 Peripheral IV - Single Lumen 02/15/19 0134 Right Antecubital less than 1 day                Physical Exam   Constitutional: He is oriented to person, place, and time. He appears well-developed and well-nourished. No distress.   HENT:   Head: Normocephalic and atraumatic.   Eyes: Conjunctivae and EOM are normal. Pupils are equal, round, and reactive to light.   Neck: Normal range of motion. Neck supple. No thyromegaly present.   Cardiovascular: Normal rate, regular rhythm and normal heart sounds.   No murmur heard.  Pulmonary/Chest: Effort normal and breath sounds normal. No respiratory distress. He has no wheezes. He has no rales. He exhibits no tenderness.   Course decreased breath sounds  Reproducible chest pain which is worse with respiration   Abdominal: Soft. Bowel sounds are normal.   Musculoskeletal: He exhibits no edema.   Neurological: He is alert and oriented to person, place, and time.   Skin: Skin is warm and dry.   Psychiatric: He has a normal mood and affect. His behavior is normal.       Significant Labs:   CMP   Recent Labs   Lab 02/15/19  0203      K 3.9      CO2 25   *   BUN 17   CREATININE 1.0   CALCIUM 9.1   PROT 7.0   ALBUMIN 3.7   BILITOT 0.7   ALKPHOS 67   AST 21   ALT 39   ANIONGAP 11   ESTGFRAFRICA >60   EGFRNONAA >60   , CBC   Recent Labs   Lab 02/15/19  0203   WBC 10.73   HGB 13.1*   HCT 40.6   *   , INR No results for input(s): INR, PROTIME in the last 48 hours., Lipid Panel No results for input(s): CHOL, HDL, LDLCALC, TRIG, CHOLHDL in the last 48 hours. and  Troponin   Recent Labs   Lab 02/15/19  0203 02/15/19  0410   TROPONINI 0.009 <0.006       Significant Imaging: EKG: Normal sinus rhythm with nonspecific ST-T changes (* personally reviewed and interpreted)    2014 Heart Cath  C. SUMMARY:     1. 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  Unable to be crossed emergently a month ago.  2. Successful recannalization of the RCA with ZOLTAN to the proximal and mid vessel (60mm long).     D. RECOMMENDATIONS:     1. 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  2. Cardiac rehab.  3. F/u [facility] Dr. Watkins for general cardiology and cardiac rehab.    PET stress:  2015  CONCLUSIONS: ABNORMAL MYOCARDIAL PERFUSION PET STRESS TEST  1. There is a very small sized mild intensity fixed defect consistent with non-transmural infarct in the inferoapical wall. This defect comprises 2 % of the left ventricular myocardium.  Absolute flow and CFR within this defect is consistent with patency   of the RCA.  2. There is a small sized mild reversible defect in the base to mid inferolateral wall in the usual distribution of the OM2. This defect comprises 10 % of the left ventricular myocardium.  Absolute flow within this defect is only mildly reduced and   typically not low enough to cause clinical signs of ischemia.  However, relative CFR, the non-invasive surrogate of FFR, is reduced in this defect consistent with discrete stenosis of OM2.   3. Resting wall motion is physiologic. Stress wall motion is physiologic.   4. Resting LV function is normal.  (normal is >= 51%)Visually estimated LV function is normal at stress.   5. The ventricular volumes are normal at rest and stress.   6. The extracardiac distribution of radioactivity is normal.     Assessment and Plan:     * Chest pain, exertional    No new signs of ACS  Symptoms currently appear more pleuritic in nature  Consider ischemic workup when stable, okay as an outpatient     Coronary atherosclerosis    History of PCI as above  Optimize  medicines for secondary prevention as tolerated     Pneumonia of right lower lobe due to infectious organism    On antibiotics per primary     Hypertension           Mixed hyperlipidemia    On statin and Lovaza     Morbid obesity    Counseled on diet and exercise       * all labs and studies were personally reviewed.    He is okay for DC in follow-up with his primary cardiologist from our standpoint.    VTE Risk Mitigation (From admission, onward)        Ordered     enoxaparin injection 40 mg  Daily      02/15/19 0815          Thank you for your consult. I will sign off. Please contact us if you have any additional questions.    Jorge Suazo MD  Cardiology   [facility]

## 2019-02-16 NOTE — DISCHARGE SUMMARY
[facility] Hospital Medicine  Discharge Summary      Patient Name: Min Nunez  MRN: [id number]  Admission Date: 2/15/2019  Hospital Length of Stay: 0 days  Discharge Date and Time: 2/15/2019  6:19 PM  Attending Physician: Kiana Bui MD  Discharging Provider: Ren Robles Jr, NP  Primary Care Provider: Boubacar Williamson MD      HPI:   Min Nunez is a 58 y.o. WM with PMHx including but not limited to CAD (sees Dr. Flaherty), hypertension, self-reported stroke 2014, and C5-C6 neck pain. Patient presented for evaluation of what he described as sharp sticky like mid sternal to lower sternal chest pain nonradiating (10/10), that awaken him from sleep.  Since that time it feels as if it has dropped a little bit more toward epigastric.  During interview the patient has a hoarse voice states is secondary to recent thyroid surgery.  Additionally the patient reports body aches, and nonproductive cough.  Chest x-ray revealed small focal opacity seen in the right mid lung zone which is somewhat nodular in configuration without evidence of focal consolidation, suggestive of infection versus inflammatory.       Chart review reveal, 1. 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  Unable to be crossed emergently a month ago.  2. Successful recannalization of the RCA with ZOLTAN to the proximal and mid vessel (60mm long).    Workup revealed normal white count afebrile, lytes within normal limits, renal and liver functions within normal limits, serial troponins negative, BNP within normal limits.  Rapid strep and flu negative.      * No surgery found *      Hospital Course:   Patient started on dual antibiotic therapy to cover him for CAP secondary to abnormal chest x-ray.  Augmentin as part of his dual therapy to cover patient for possible aspiration as he takes multiple sedating medications including his psych meds and narcotics.  His chest pain was atypical mostly epigastric nonradiating, suspect  pleuritic in nature secondary to his likely for respiratory infection.  Troponin 1 levels trended negative, Cardiology consulted for opinion given patient's risk factors.  2D echo repeated and shows EF 50%.  He is afebrile without white count, renal functions and liver functions within normal limits, BNP, blood cultures, strep screen, influenza all negative.  Stable for further testing if deemed necessary by his Cardiologist.  Instructed to follow up with his PCP and cardiologist.  Findings and plan discussed with patient, verbalizes agreement and understanding.  Discharged home in stable condition.     Consults:   Consults (From admission, onward)        Status Ordering Provider     Inpatient consult to Cardiology  Once     Provider:  Jorge Suazo MD    Completed BERRY PATEL          * Chest pain, exertional    Atypical sticky sharp mid-->lower sternal non-radiating chest pain with normal troponin 1 level thus far and normal bnp. Patient reports history of stent?? Continuous cardiac monitoring - unclear etiology however cannot rule out cardiac etiology considering patient's extensive cardiac history- -his LVEF was 70% in 2015, and had abnormal stress at that time  -- will continue to trend trops however, likely more consistent with his back pain and neck pain moreso than chest --  Continue to trend CE Q8 hrs X3 sets (1/3 neg)  ASA  81 mg PO Daily  Nitro SL prn CP  Supplemental oxygen at 2L NC PRN SOB  2D Echo  UA, TSH, lipid panel  Defer narcotics - patient recently ordered 135 pills on 01/18/2019         Final Active Diagnoses:    Diagnosis Date Noted POA    PRINCIPAL PROBLEM:  Chest pain, exertional [R07.9] 04/14/2014 Yes    Pneumonia of right lower lobe due to infectious organism [J18.1] 02/15/2019 Yes    Hypertension [I10] 02/15/2019 Unknown    Mixed hyperlipidemia [E78.2] 02/15/2019 Unknown    Morbid obesity [E66.01] 02/15/2019 Unknown    Anxiety [F41.9] 02/15/2019 Yes    Coronary  atherosclerosis [I25.10] 04/23/2014 Yes      Problems Resolved During this Admission:       Discharged Condition: stable    Disposition: Home or Self Care    Follow Up:  Follow-up Information     Boubacar Williamson MD. Schedule an appointment as soon as possible for a visit in 2 days.    Specialty:  General Practice  Contact information:  [address] [facility]  [place] [address]  [phone]             Schedule an appointment as soon as possible for a visit with Rey Flaherty MD.    Specialty:  Cardiology  Why:  for Cardiology follow up as needed  Contact information:  [address] [place] [address] [address]  [place] [id number] [phone]                 Patient Instructions:   No discharge procedures on file.    Significant Diagnostic Studies: Labs:   CMP   Recent Labs   Lab 02/15/19  0203      K 3.9      CO2 25   *   BUN 17   CREATININE 1.0   CALCIUM 9.1   PROT 7.0   ALBUMIN 3.7   BILITOT 0.7   ALKPHOS 67   AST 21   ALT 39   ANIONGAP 11   ESTGFRAFRICA >60   EGFRNONAA >60   , CBC   Recent Labs   Lab 02/15/19  0203   WBC 10.73   HGB 13.1*   HCT 40.6   *    and Troponin   Recent Labs   Lab 02/15/19  0951   TROPONINI <0.006     Cardiac Graphics: Echocardiogram:   Transthoracic echo (TTE) complete (Cupid Only):   Results for orders placed or performed during the hospital encounter of 02/15/19   Transthoracic echo (TTE) complete (Cupid Only)   Result Value Ref Range    BSA 3.04 m2    TDI SEPTAL 0.06     LV LATERAL E/E' RATIO 7.33     LV SEPTAL E/E' RATIO 11.00     LA WIDTH 3.80 cm    AORTIC VALVE CUSP SEPERATION 2.21 cm    TDI LATERAL 0.09     PV PEAK VELOCITY 0.85 cm/s    LVIDD 3.97 3.5 - 6.0 cm    IVS 1.50 (A) 0.6 - 1.1 cm    PW 1.47 (A) 0.6 - 1.1 cm    Ao root annulus 3.91 cm    LVIDS 3.05 2.1 - 4.0 cm    FS 23 28 - 44 %    LA volume 60.43 cm3    Sinus 3.88 cm    STJ 3.48 cm    Ascending aorta 3.45 cm    LV mass 226.64 g    LA size 3.55 cm    RVDD 3.96 cm    TAPSE 2.56 cm    RV S' 11.36 m/s     Left Ventricle Relative Wall Thickness 0.74 cm    AV mean gradient 4.37 mmHg    AV valve area 3.17 cm2    AV Velocity Ratio 0.81     AV index (prosthetic) 0.84     E/A ratio 1.16     Mean e' 0.08     E wave decelartion time 186.23 msec    IVRT 0.13 msec    Pulm vein S/D ratio 1.27     LVOT diameter 2.19 cm    LVOT area 3.76 cm2    LVOT peak andrew [id number] m/s    LVOT peak VTI 23.48 cm    Ao peak andrew 1.27 m/s    Ao VTI 27.91 cm    LVOT stroke volume 88.40 cm3    AV peak gradient 6.45 mmHg    E/E' ratio 8.80     MV Peak E Andrew 0.66 m/s    TR Max Andrew 2.23 m/s    MV Peak A Andrew 0.57 m/s    PV Peak S Andrew 0.52 m/s    PV Peak D Andrew 0.41 m/s    LV Systolic Volume 36.45 mL    LV Systolic Volume Index 12.9 mL/m2    LV Diastolic Volume 68.91 mL    LV Diastolic Volume Index 24.37 mL/m2    LA Volume Index 21.4 mL/m2    LV Mass Index 80.1 g/m2    RA Major Axis 4.96 cm    Left Atrium Minor Axis 5.11 cm    Left Atrium Major Axis 5.44 cm    Triscuspid Valve Regurgitation Peak Gradient 19.89 mmHg    RA Width 3.57 cm    Right Atrial Pressure (from IVC) 3 mmHg    TV rest pulmonary artery pressure 23 mmHg       Pending Diagnostic Studies:     None         Medications:  Reconciled Home Medications:      Medication List      START taking these medications    albuterol 90 mcg/actuation inhaler  Commonly known as:  PROAIR HFA  Inhale 2 puffs into the lungs every 6 (six) hours as needed for Wheezing.     amoxicillin-clavulanate 875-125mg 875-125 mg per tablet  Commonly known as:  AUGMENTIN  Take 1 tablet by mouth every 12 (twelve) hours. for 7 days     azithromycin 250 MG tablet  Commonly known as:  Z-ABBY  Take 1 tablet (250 mg total) by mouth once daily. Take first 2 tablets together, then 1 every day until finished.        CHANGE how you take these medications    divalproex 250 MG EC tablet  Commonly known as:  DEPAKOTE  1 pill in morning, 1 pill in evening  What changed:    · how much to take  · how to take this  · when to take  this  · additional instructions     hydrOXYzine pamoate 25 MG Cap  Commonly known as:  VISTARIL  1 pill 3x a day for 2 days, rest left over every 8 hours as needed for migraine  What changed:    · how much to take  · how to take this  · when to take this  · additional instructions        CONTINUE taking these medications    ALPRAZolam 0.5 MG tablet  Commonly known as:  XANAX  Take 1 tablet (0.5 mg total) by mouth 2 (two) times daily as needed for Anxiety.     busPIRone 15 MG tablet  Commonly known as:  BUSPAR  Take 1 tablet (15 mg total) by mouth 3 (three) times daily.     chlorhexidine 0.12 % solution  Commonly known as:  PERIDEX  swish AND SPIT FIFTEEN MILLILITERS BY MOUTH TWICE DAILY     citalopram 40 MG tablet  Commonly known as:  CELEXA  Take 1 tablet (40 mg total) by mouth once daily.     fenofibrate 160 MG Tab  Take 160 mg by mouth once daily.     gabapentin 300 MG capsule  Commonly known as:  NEURONTIN  Take 300 mg by mouth once daily.     irbesartan 300 MG tablet  Commonly known as:  AVAPRO  Take 300 mg by mouth once daily.     isosorbide mononitrate 60 MG 24 hr tablet  Commonly known as:  IMDUR  Take 60 mg by mouth every morning.     lidocaine HCl 3 % Crea  APPLY 2 grams to the painful area THREE TIMES A DAY     meloxicam 7.5 MG tablet  Commonly known as:  MOBIC  Take 7.5 mg by mouth 2 (two) times daily.     metoprolol succinate 25 MG 24 hr tablet  Commonly known as:  TOPROL-XL  Take 25 mg by mouth 2 (two) times daily.     midodrine 2.5 MG Tab  Commonly known as:  PROAMATINE  Take 2.5 mg by mouth 2 (two) times daily with meals.     mirabegron 25 mg Tb24 ER tablet  Commonly known as:  MYRBETRIQ  Take 1 tablet (25 mg total) by mouth once daily.     mupirocin 2 % ointment  Commonly known as:  BACTROBAN  2 (two) times daily.     nitroGLYCERIN 0.4 MG SL tablet  Commonly known as:  NITROSTAT  Place 0.4 mg under the tongue every 5 (five) minutes as needed for Chest pain.     NON FORMULARY MEDICATION  Apply  topically 4 (four) times daily as needed. Gabapentin 5%, Orphenadrine 2%, Lidocaine 2%, Prilocaine 2%     omega-3 acid ethyl esters 1 gram capsule  Commonly known as:  LOVAZA  Take 2 capsules by mouth 2 (two) times daily.     oxyCODONE-acetaminophen  mg per tablet  Commonly known as:  PERCOCET  Take 1 tablet by mouth every 4 (four) hours as needed for Pain.     pravastatin 80 MG tablet  Commonly known as:  PRAVACHOL  Take 80 mg by mouth once daily.     RANEXA 1,000 mg Tb12  Generic drug:  ranolazine  Take 1,000 mg by mouth 2 (two) times daily.     RESTASIS 0.05 % ophthalmic emulsion  Generic drug:  cycloSPORINE  Place 1 drop into both eyes 2 (two) times daily.     solifenacin 10 MG tablet  Commonly known as:  VESICARE  Take 1 tablet (10 mg total) by mouth once daily.     tamsulosin 0.4 mg Cap  Commonly known as:  FLOMAX  Take 1 capsule (0.4 mg total) by mouth once daily.     tiZANidine 4 MG tablet  Commonly known as:  ZANAFLEX  ONE TABLET BY MOUTH IN THE EVENING     traZODone 150 MG tablet  Commonly known as:  DESYREL  Take 150 mg or 300 mg at bedtime for sleep     XARELTO 15 mg Tab  Generic drug:  rivaroxaban  ONE TABLET BY MOUTH TWICE DAILY FOR 21 DAYS            Indwelling Lines/Drains at time of discharge:   Lines/Drains/Airways          None          Time spent on the discharge of patient:  35 minutes  Patient was seen and examined on the date of discharge and determined to be suitable for discharge.         Ren Robles Jr, NP  Department of Hospital Medicine  [facility]

## 2019-02-17 LAB — BACTERIA THROAT CULT: NORMAL

## 2019-02-20 LAB
BACTERIA BLD CULT: NORMAL
BACTERIA BLD CULT: NORMAL

## 2019-02-27 ENCOUNTER — TELEPHONE (OUTPATIENT)
Dept: UROLOGY | Facility: CLINIC | Age: 59
End: 2019-02-27

## 2019-02-27 NOTE — TELEPHONE ENCOUNTER
Lt message appt for 03/12/19 will need to be yuli for next available  is out of the office.-trevon

## 2019-03-06 DIAGNOSIS — N40.0 BPH WITHOUT OBSTRUCTION/LOWER URINARY TRACT SYMPTOMS: ICD-10-CM

## 2019-03-06 DIAGNOSIS — R35.0 URINARY FREQUENCY: ICD-10-CM

## 2019-03-06 NOTE — TELEPHONE ENCOUNTER
----- Message from Lawrence William sent at 3/6/2019  2:52 PM CST -----  Contact: Self/[phone]  Refill:mirabegron (MYRBETRIQ) 25 mg Tb24 ER tablet  Refill:tamsulosin (FLOMAX) 0.4 mg [facility] [address] [facility] [id number]  [phone]        Thank you

## 2019-03-07 RX ORDER — TAMSULOSIN HYDROCHLORIDE 0.4 MG/1
0.4 CAPSULE ORAL DAILY
Qty: 90 CAPSULE | Refills: 3 | Status: SHIPPED | OUTPATIENT
Start: 2019-03-07 | End: 2019-04-02 | Stop reason: SDUPTHER

## 2019-03-13 ENCOUNTER — OFFICE VISIT (OUTPATIENT)
Dept: PSYCHIATRY | Facility: CLINIC | Age: 59
End: 2019-03-13
Payer: MEDICARE

## 2019-03-13 VITALS
WEIGHT: 246.5 LBS | HEIGHT: 69 IN | BODY MASS INDEX: 36.51 KG/M2 | HEART RATE: 78 BPM | DIASTOLIC BLOOD PRESSURE: 72 MMHG | SYSTOLIC BLOOD PRESSURE: 132 MMHG

## 2019-03-13 DIAGNOSIS — F32.89 OTHER DEPRESSION: Primary | ICD-10-CM

## 2019-03-13 PROCEDURE — 99213 PR OFFICE/OUTPT VISIT, EST, LEVL III, 20-29 MIN: ICD-10-PCS | Mod: S$PBB,,, | Performed by: PSYCHIATRY & NEUROLOGY

## 2019-03-13 PROCEDURE — 99212 OFFICE O/P EST SF 10 MIN: CPT | Mod: PBBFAC | Performed by: PSYCHIATRY & NEUROLOGY

## 2019-03-13 PROCEDURE — 99999 PR PBB SHADOW E&M-EST. PATIENT-LVL II: ICD-10-PCS | Mod: PBBFAC,,, | Performed by: PSYCHIATRY & NEUROLOGY

## 2019-03-13 PROCEDURE — 99213 OFFICE O/P EST LOW 20 MIN: CPT | Mod: S$PBB,,, | Performed by: PSYCHIATRY & NEUROLOGY

## 2019-03-13 PROCEDURE — 99999 PR PBB SHADOW E&M-EST. PATIENT-LVL II: CPT | Mod: PBBFAC,,, | Performed by: PSYCHIATRY & NEUROLOGY

## 2019-03-13 RX ORDER — HYDROXYZINE PAMOATE 25 MG/1
25 CAPSULE ORAL 3 TIMES DAILY PRN
Qty: 30 CAPSULE | Refills: 3 | Status: SHIPPED | OUTPATIENT
Start: 2019-03-13

## 2019-03-13 RX ORDER — BUSPIRONE HYDROCHLORIDE 15 MG/1
15 TABLET ORAL 3 TIMES DAILY
Qty: 90 TABLET | Refills: 3 | Status: SHIPPED | OUTPATIENT
Start: 2019-03-13 | End: 2019-06-18 | Stop reason: SDUPTHER

## 2019-03-13 RX ORDER — TRAZODONE HYDROCHLORIDE 150 MG/1
TABLET ORAL
Qty: 45 TABLET | Refills: 3 | Status: SHIPPED | OUTPATIENT
Start: 2019-03-13 | End: 2019-06-26 | Stop reason: SDUPTHER

## 2019-03-13 RX ORDER — CITALOPRAM 40 MG/1
40 TABLET, FILM COATED ORAL DAILY
Qty: 30 TABLET | Refills: 3 | Status: SHIPPED | OUTPATIENT
Start: 2019-03-13 | End: 2019-06-26 | Stop reason: SDUPTHER

## 2019-03-13 NOTE — PROGRESS NOTES
ESTABLISHED OUTPATIENT VISIT   E/M LEVEL 3: [id number]    ENCOUNTER DATE: 3/13/2019  SITE: [facility]    HISTORY    CHIEF COMPLAINT   Min Nunez is a 58 y.o. male who presents for follow up of anxiety/depression.      HPI    Remains only able to whisper.    Physical issues remain troublesome, affect mood.     Overall appears stable psychiatrically.    No longer taking Xanax.    Psychiatric Review Of Systems - Is patient experiencing or having changes in:  sleep: varies  appetite: no  weight: has gained weight  energy/anergy: no  interest/pleasure/anhedonia: no  somatic symptoms: no  libido: no  anxiety/panic: anxiety at times  guilty/hopelessness: no  concentration: no  S.I.B.s/risky behavior: no  Irritability: no  Racing thoughts: no  Impulsive behaviors: no  Paranoia:no  AVH:no    Recent alcohol: no  Recent drug: no    Medical ROS   Multiple physical problems     PAST MEDICAL, FAMILY AND SOCIAL HISTORY: The patient's past medical, family and social history have been reviewed and updated as appropriate within the electronic medical record - see encounter notes.    PSYCHOTROPIC MEDICATIONS   Trazodone 150-300 mg at bedtime, Buspar 15 mg tid, Celexa 40 mg qam, Depakote 500 mg qam, Depakote 500 mg at bedtime[for headaches], Neurontin 300 mg tid, Hydroxyzine prn for migraines    Scheduled and PRN Medications     Current Outpatient Medications:     albuterol (PROAIR HFA) 90 mcg/actuation inhaler, Inhale 2 puffs into the lungs every 6 (six) hours as needed for Wheezing., Disp: 18 g, Rfl: 0    ALPRAZolam (XANAX) 0.5 MG tablet, Take 1 tablet (0.5 mg total) by mouth 2 (two) times daily as needed for Anxiety., Disp: 30 tablet, Rfl: 2    azithromycin (Z-ABBY) 250 MG tablet, Take 1 tablet (250 mg total) by mouth once daily. Take first 2 tablets together, then 1 every day until finished., Disp: 6 tablet, Rfl: 0    busPIRone (BUSPAR) 15 MG tablet, Take 1 tablet (15 mg total) by mouth 3 (three)  times daily., Disp: 90 tablet, Rfl: 3    chlorhexidine (PERIDEX) 0.12 % solution, swish AND SPIT FIFTEEN MILLILITERS BY MOUTH TWICE DAILY, Disp: , Rfl: 0    citalopram (CELEXA) 40 MG tablet, Take 1 tablet (40 mg total) by mouth once daily., Disp: 30 tablet, Rfl: 3    divalproex (DEPAKOTE) 250 MG EC tablet, 1 pill in morning, 1 pill in evening (Patient taking differently: Take 500 mg by mouth 2 (two) times daily. ), Disp: 60 tablet, Rfl: 6    fenofibrate 160 MG Tab, Take 160 mg by mouth once daily., Disp: , Rfl:     gabapentin (NEURONTIN) 300 MG capsule, Take 300 mg by mouth once daily., Disp: , Rfl:     hydrOXYzine pamoate (VISTARIL) 25 MG Cap, 1 pill 3x a day for 2 days, rest left over every 8 hours as needed for migraine (Patient taking differently: Take 25 mg by mouth once daily. 1 pill 3x a day for 2 days, rest left over every 8 hours as needed for migraine), Disp: 15 capsule, Rfl: 3    irbesartan (AVAPRO) 300 MG tablet, Take 300 mg by mouth once daily., Disp: , Rfl:     isosorbide mononitrate (IMDUR) 60 MG 24 hr tablet, Take 60 mg by mouth every morning., Disp: , Rfl: 3    lidocaine HCl 3 % Crea, APPLY 2 grams to the painful area THREE TIMES A DAY, Disp: , Rfl: 3    meloxicam (MOBIC) 7.5 MG tablet, Take 7.5 mg by mouth 2 (two) times daily. , Disp: , Rfl:     metoprolol succinate (TOPROL-XL) 25 MG 24 hr tablet, Take 25 mg by mouth 2 (two) times daily. , Disp: , Rfl:     midodrine (PROAMATINE) 2.5 MG Tab, Take 2.5 mg by mouth 2 (two) times daily with meals. , Disp: , Rfl:     mirabegron (MYRBETRIQ) 25 mg Tb24 ER tablet, Take 1 tablet (25 mg total) by mouth once daily., Disp: 90 tablet, Rfl: 3    mupirocin (BACTROBAN) 2 % ointment, 2 (two) times daily., Disp: , Rfl: 0    nitroGLYCERIN (NITROSTAT) 0.4 MG SL tablet, Place 0.4 mg under the tongue every 5 (five) minutes as needed for Chest pain., Disp: , Rfl:     NON FORMULARY MEDICATION, Apply topically 4 (four) times daily as needed. Gabapentin 5%,  Orphenadrine 2%, Lidocaine 2%, Prilocaine 2%, Disp: , Rfl:     omega-3 acid ethyl esters (LOVAZA) 1 gram capsule, Take 2 capsules by mouth 2 (two) times daily., Disp: , Rfl: 3    oxycodone-acetaminophen (PERCOCET)  mg per tablet, Take 1 tablet by mouth every 4 (four) hours as needed for Pain., Disp: 30 tablet, Rfl: 0    pravastatin (PRAVACHOL) 80 MG tablet, Take 80 mg by mouth once daily., Disp: , Rfl: 4    RANEXA 1,000 mg Tb12, Take 1,000 mg by mouth 2 (two) times daily., Disp: , Rfl: 3    RESTASIS 0.05 % ophthalmic emulsion, Place 1 drop into both eyes 2 (two) times daily., Disp: , Rfl: 4    solifenacin (VESICARE) 10 MG tablet, Take 1 tablet (10 mg total) by mouth once daily., Disp: 90 tablet, Rfl: 3    tamsulosin (FLOMAX) 0.4 mg Cap, Take 1 capsule (0.4 mg total) by mouth once daily., Disp: 90 capsule, Rfl: 3    tizanidine (ZANAFLEX) 4 MG tablet, ONE TABLET BY MOUTH IN THE EVENING, Disp: , Rfl: 2    traZODone (DESYREL) 150 MG tablet, Take 150 mg or 300 mg at bedtime for sleep, Disp: 45 tablet, Rfl: 3    XARELTO 15 mg Tab, ONE TABLET BY MOUTH TWICE DAILY FOR 21 DAYS, Disp: , Rfl: 0    EXAMINATION    Vitals:    03/13/19 1310   BP: 132/72   Pulse: 78     Weight 246 lb's    CONSTITUTIONAL  General Appearance: well nourished    MUSCULOSKELETAL  Muscle Strength and Tone: normal strength and tone  Abnormal Involuntary Movements: no abnormal movement noted  Gait and Station: normal gait    PSYCHIATRIC   Level of Consciousness: alert  Orientation: oriented to person, place and time  Grooming: well groomed  Psychomotor Behavior: no restlessness/agitation  Speech: normal in rate, rhythm and volume  Language: normal vocabulary  Mood: anxious at times  Affect: full range and appropriate  Thought Process: logical and goal directed  Associations: intact associations  Thought Content: no SI/HI  Memory: grossly intact  Attention: intact to content of interview  Fund of Knowledge: appears adequate  Insight:  good  Judgement: good    MEDICAL DECISION MAKING    DIAGNOSES  Anxiety d/o, unspecified    PROBLEM LIST AND MANAGEMENT PLANS    - anxiety:     Continue Celexa, Buspar, Trazodone and Hydroxyzine as above for now  - rtc 3 months     Time with patient: 20 min    LABORATORY DATA  Admission on 02/15/2019, Discharged on 02/15/2019   Component Date Value Ref Range Status    BNP 02/15/2019 15  0 - 99 pg/mL Final    Values of less than 100 pg/ml are consistent with non-CHF populations.    WBC 02/15/2019 10.73  3.90 - 12.70 K/uL Final    RBC 02/15/2019 4.45* 4.60 - 6.20 M/uL Final    Hemoglobin 02/15/2019 13.1* 14.0 - 18.0 g/dL Final    Hematocrit 02/15/2019 40.6  40.0 - 54.0 % Final    MCV 02/15/2019 91  82 - 98 fL Final    MCH 02/15/2019 29.4  27.0 - 31.0 pg Final    MCHC 02/15/2019 32.3  32.0 - 36.0 g/dL Final    RDW 02/15/2019 14.1  11.5 - 14.5 % Final    Platelets 02/15/2019 118* 150 - 350 K/uL Final    MPV 02/15/2019 12.4  9.2 - 12.9 fL Final    Gran # (ANC) 02/15/2019 8.0* 1.8 - 7.7 K/uL Final    Lymph # 02/15/2019 1.4  1.0 - 4.8 K/uL Final    Mono # 02/15/2019 1.3* 0.3 - 1.0 K/uL Final    Eos # 02/15/2019 0.0  0.0 - 0.5 K/uL Final    Baso # 02/15/2019 0.02  0.00 - 0.20 K/uL Final    Gran% 02/15/2019 74.3* 38.0 - 73.0 % Final    Lymph% 02/15/2019 13.1* 18.0 - 48.0 % Final    Mono% 02/15/2019 12.4  4.0 - 15.0 % Final    Eosinophil% 02/15/2019 0.0  0.0 - 8.0 % Final    Basophil% 02/15/2019 0.2  0.0 - 1.9 % Final    Differential Method 02/15/2019 Automated   Final    Sodium 02/15/2019 137  136 - 145 mmol/L Final    Potassium 02/15/2019 3.9  3.5 - 5.1 mmol/L Final    Chloride 02/15/2019 101  95 - 110 mmol/L Final    CO2 02/15/2019 25  23 - 29 mmol/L Final    Glucose 02/15/2019 148* 70 - 110 mg/dL Final    BUN, Bld 02/15/2019 17  6 - 20 mg/dL Final    Creatinine 02/15/2019 1.0  0.5 - 1.4 mg/dL Final    Calcium 02/15/2019 9.1  8.7 - 10.5 mg/dL Final    Total Protein 02/15/2019 7.0  6.0 - 8.4  g/dL Final    Albumin 02/15/2019 3.7  3.5 - 5.2 g/dL Final    Total Bilirubin 02/15/2019 0.7  0.1 - 1.0 mg/dL Final    Comment: For infants and newborns, interpretation of results should be based  on gestational age, weight and in agreement with clinical  observations.  Premature Infant recommended reference ranges:  Up to 24 hours.............<8.0 mg/dL  Up to 48 hours............<12.0 mg/dL  3-5 days..................<15.0 mg/dL  6-29 days.................<15.0 mg/dL      Alkaline Phosphatase 02/15/2019 67  55 - 135 U/L Final    AST 02/15/2019 21  10 - 40 U/L Final    ALT 02/15/2019 39  10 - 44 U/L Final    Anion Gap 02/15/2019 11  8 - 16 mmol/L Final    eGFR if African American 02/15/2019 >60  >60 mL/min/1.73 m^2 Final    eGFR if non African American 02/15/2019 >60  >60 mL/min/1.73 m^2 Final    Comment: Calculation used to obtain the estimated glomerular filtration  rate (eGFR) is the CKD-EPI equation.       Troponin I 02/15/2019 0.009  0.000 - 0.026 ng/mL Final    Comment: The reference interval for Troponin I represents the 99th percentile   cutoff   for our facility and is consistent with 3rd generation assay   performance.      Rapid Influenza A Ag 02/15/2019 Negative  Negative Final    Rapid Influenza B Ag 02/15/2019 Negative  Negative Final     Acceptable 02/15/2019 Yes   Final    Rapid Strep A Screen 02/15/2019 Negative  Negative Final    See Micro for reflexed Strep culture.    Troponin I 02/15/2019 <0.006  0.000 - 0.026 ng/mL Final    Comment: The reference interval for Troponin I represents the 99th percentile   cutoff   for our facility and is consistent with 3rd generation assay   performance.      Strep A Culture 02/15/2019 No  Group A  Streptococcus isolated   Final    Blood Culture, Routine 02/15/2019 No growth after 5 days.   Final    Blood Culture, Routine 02/15/2019 No growth after 5 days.   Final    Troponin I 02/15/2019 <0.006  0.000 - 0.026 ng/mL Final     Comment: The reference interval for Troponin I represents the 99th percentile   cutoff   for our facility and is consistent with 3rd generation assay   performance.      BSA 02/15/2019 3.04  m2 Final    TDI SEPTAL 02/15/2019 0.06   Final    LV LATERAL E/E' RATIO 02/15/2019 7.33   Final    LV SEPTAL E/E' RATIO 02/15/2019 11.00   Final    LA WIDTH 02/15/2019 3.80  cm Final    AORTIC VALVE CUSP SEPERATION 02/15/2019 2.21  cm Final    TDI LATERAL 02/15/2019 0.09   Final    PV PEAK VELOCITY 02/15/2019 0.85  cm/s Final    LVIDD 02/15/2019 3.97  3.5 - 6.0 cm Final    IVS 02/15/2019 1.50* 0.6 - 1.1 cm Final    PW 02/15/2019 1.47* 0.6 - 1.1 cm Final    Ao root annulus 02/15/2019 3.91  cm Final    LVIDS 02/15/2019 3.05  2.1 - 4.0 cm Final    FS 02/15/2019 23  28 - 44 % Final    LA volume 02/15/2019 60.43  cm3 Final    Sinus 02/15/2019 3.88  cm Final    STJ 02/15/2019 3.48  cm Final    Ascending aorta 02/15/2019 3.45  cm Final    LV mass 02/15/2019 226.64  g Final    LA size 02/15/2019 3.55  cm Final    RVDD 02/15/2019 3.96  cm Final    TAPSE 02/15/2019 2.56  cm Final    RV S' 02/15/2019 11.36  m/s Final    Left Ventricle Relative Wall Thick* 02/15/2019 0.74  cm Final    AV mean gradient 02/15/2019 4.37  mmHg Final    AV valve area 02/15/2019 3.17  cm2 Final    AV Velocity Ratio 02/15/2019 0.81   Final    AV index (prosthetic) 02/15/2019 0.84   Final    E/A ratio 02/15/2019 1.16   Final    Mean e' 02/15/2019 0.08   Final    E wave decelartion time 02/15/2019 186.23  msec Final    IVRT 02/15/2019 0.13  msec Final    Pulm vein S/D ratio 02/15/2019 1.27   Final    LVOT diameter 02/15/2019 2.19  cm Final    LVOT area 02/15/2019 3.76  cm2 Final    LVOT peak jody 02/15/2019 [id number]  m/s Final    LVOT peak VTI 02/15/2019 23.48  cm Final    Ao peak ojdy 02/15/2019 1.27  m/s Final    Ao VTI 02/15/2019 27.91  cm Final    LVOT stroke volume 02/15/2019 88.40  cm3 Final    AV peak gradient  02/15/2019 6.45  mmHg Final    E/E' ratio 02/15/2019 8.80   Final    MV Peak E Andrew 02/15/2019 0.66  m/s Final    TR Max Andrew 02/15/2019 2.23  m/s Final    MV Peak A Andrew 02/15/2019 0.57  m/s Final    PV Peak S Andrew 02/15/2019 0.52  m/s Final    PV Peak D Andrew 02/15/2019 0.41  m/s Final    LV Systolic Volume 02/15/2019 36.45  mL Final    LV Systolic Volume Index 02/15/2019 12.9  mL/m2 Final    LV Diastolic Volume 02/15/2019 68.91  mL Final    LV Diastolic Volume Index 02/15/2019 24.37  mL/m2 Final    LA Volume Index 02/15/2019 21.4  mL/m2 Final    LV Mass Index 02/15/2019 80.1  g/m2 Final    RA Major Axis 02/15/2019 4.96  cm Final    Left Atrium Minor Axis 02/15/2019 5.11  cm Final    Left Atrium Major Axis 02/15/2019 5.44  cm Final    Triscuspid Valve Regurgitation Pea* 02/15/2019 19.89  mmHg Final    RA Width 02/15/2019 3.57  cm Final    Right Atrial Pressure (from IVC) 02/15/2019 3  mmHg Final    TV rest pulmonary artery pressure 02/15/2019 23  mmHg Final    TSH 02/15/2019 0.754  0.400 - 4.000 uIU/mL Final    Cholesterol 02/15/2019 127  120 - 199 mg/dL Final    Comment: The National Cholesterol Education Program (NCEP) has set the  following guidelines (reference ranges) for Cholesterol:  Optimal.....................<200 mg/dL  Borderline High.............200-239 mg/dL  High........................> or = 240 mg/dL      Triglycerides 02/15/2019 143  30 - 150 mg/dL Final    Comment: The National Cholesterol Education Program (NCEP) has set the  following guidelines (reference values) for triglycerides:  Normal......................<150 mg/dL  Borderline High.............150-199 mg/dL  High........................200-499 mg/dL      HDL 02/15/2019 23* 40 - 75 mg/dL Final    Comment: The National Cholesterol Education Program (NCEP) has set the  following guidelines (reference values) for HDL Cholesterol:  Low...............<40 mg/dL  Optimal...........>60 mg/dL      LDL Cholesterol 02/15/2019 75.4   63.0 - 159.0 mg/dL Final    Comment: The National Cholesterol Education Program (NCEP) has set the  following guidelines (reference values) for LDL Cholesterol:  Optimal.......................<130 mg/dL  Borderline High...............130-159 mg/dL  High..........................160-189 mg/dL  Very High.....................>190 mg/dL      HDL/Chol Ratio 02/15/2019 18.1* 20.0 - 50.0 % Final    Total Cholesterol/HDL Ratio 02/15/2019 5.5* 2.0 - 5.0 Final    Non-HDL Cholesterol 02/15/2019 104  mg/dL Final    Comment: Risk category and Non-HDL cholesterol goals:  Coronary heart disease (CHD)or equivalent (10-year risk of CHD >20%):  Non-HDL cholesterol goal     <130 mg/dL  Two or more CHD risk factors and 10-year risk of CHD <= 20%:  Non-HDL cholesterol goal     <160 mg/dL  0 to 1 CHD risk factor:  Non-HDL cholesterol goal     <190 mg/dL             Sampson Luke

## 2019-04-02 ENCOUNTER — OFFICE VISIT (OUTPATIENT)
Dept: UROLOGY | Facility: CLINIC | Age: 59
End: 2019-04-02
Payer: MEDICARE

## 2019-04-02 VITALS — BODY MASS INDEX: 36.43 KG/M2 | HEIGHT: 69 IN | WEIGHT: 246 LBS

## 2019-04-02 DIAGNOSIS — R39.15 URINARY URGENCY: ICD-10-CM

## 2019-04-02 DIAGNOSIS — N50.819 ORCHALGIA: Primary | ICD-10-CM

## 2019-04-02 DIAGNOSIS — R35.0 URINARY FREQUENCY: ICD-10-CM

## 2019-04-02 DIAGNOSIS — N40.0 BPH WITHOUT OBSTRUCTION/LOWER URINARY TRACT SYMPTOMS: ICD-10-CM

## 2019-04-02 DIAGNOSIS — R35.1 NOCTURIA MORE THAN TWICE PER NIGHT: ICD-10-CM

## 2019-04-02 PROCEDURE — 99999 PR PBB SHADOW E&M-EST. PATIENT-LVL III: ICD-10-PCS | Mod: PBBFAC,,, | Performed by: UROLOGY

## 2019-04-02 PROCEDURE — 81001 URINALYSIS AUTO W/SCOPE: CPT | Mod: PBBFAC,91 | Performed by: UROLOGY

## 2019-04-02 PROCEDURE — 81001 URINALYSIS AUTO W/SCOPE: CPT | Mod: PBBFAC | Performed by: UROLOGY

## 2019-04-02 PROCEDURE — 99999 PR PBB SHADOW E&M-EST. PATIENT-LVL III: CPT | Mod: PBBFAC,,, | Performed by: UROLOGY

## 2019-04-02 PROCEDURE — 99214 OFFICE O/P EST MOD 30 MIN: CPT | Mod: S$PBB,,, | Performed by: UROLOGY

## 2019-04-02 PROCEDURE — 99213 OFFICE O/P EST LOW 20 MIN: CPT | Mod: PBBFAC | Performed by: UROLOGY

## 2019-04-02 PROCEDURE — 99214 PR OFFICE/OUTPT VISIT, EST, LEVL IV, 30-39 MIN: ICD-10-PCS | Mod: S$PBB,,, | Performed by: UROLOGY

## 2019-04-02 RX ORDER — TAMSULOSIN HYDROCHLORIDE 0.4 MG/1
0.4 CAPSULE ORAL DAILY
Qty: 90 CAPSULE | Refills: 3 | Status: SHIPPED | OUTPATIENT
Start: 2019-04-02 | End: 2019-07-30 | Stop reason: SDUPTHER

## 2019-04-02 RX ORDER — OXYBUTYNIN CHLORIDE 5 MG/1
5 TABLET, EXTENDED RELEASE ORAL DAILY
Qty: 90 TABLET | Refills: 3 | Status: SHIPPED | OUTPATIENT
Start: 2019-04-02 | End: 2019-07-08

## 2019-04-02 NOTE — PROGRESS NOTES
Subjective:       Patient ID: Min Nunez is a 58 y.o. male who was last seen in this office 12/4/2018    Chief Complaint:   Chief Complaint   Patient presents with    Testicle Pain     3 month f/u pt states still having scrotal pain        Orchalgia  He had a bilateral epididymectomy on 8/5/2016.  He continues to have right sided swelling and bilateral pain.  His right sided pain is worse than left.   He complains of pain with sitting, standing, getting up to stand, after roly movements, lifting, and walking.  He has difficulty walking or performing exercises.         Urinary frequency and Urgency Incontinence  He has some frequency and urgency that is now improving with Vesicare and Myrbetriq and Flomax.  Vesicare is no longer covered.  He would like to try something else.      ACTIVE MEDICAL ISSUES:  Patient Active Problem List   Diagnosis    Chest pain, exertional    Coronary artery disease, occlusive    Coronary atherosclerosis    Orchalgia    Dizziness    Syncope    Abnormal brain MRI    Migraines    Atypical migraine    HA (headache)    Numbness    Facial droop    Faintness    Groin pain    Constipation    Hypotension    Urinary urgency    Nocturia more than twice per night    Pneumonia of right lower lobe due to infectious organism    Hypertension    Mixed hyperlipidemia    Morbid obesity    Anxiety       ALLERGIES AND MEDICATIONS: updated and reviewed.  Review of patient's allergies indicates:  No Known Allergies  Current Outpatient Medications   Medication Sig    albuterol (PROAIR HFA) 90 mcg/actuation inhaler Inhale 2 puffs into the lungs every 6 (six) hours as needed for Wheezing.    busPIRone (BUSPAR) 15 MG tablet Take 1 tablet (15 mg total) by mouth 3 (three) times daily.    chlorhexidine (PERIDEX) 0.12 % solution swish AND SPIT FIFTEEN MILLILITERS BY MOUTH TWICE DAILY    citalopram (CELEXA) 40 MG tablet Take 1 tablet (40 mg total) by mouth once daily.    divalproex  (DEPAKOTE) 250 MG EC tablet 1 pill in morning, 1 pill in evening (Patient taking differently: Take 500 mg by mouth 2 (two) times daily. )    fenofibrate 160 MG Tab Take 160 mg by mouth once daily.    gabapentin (NEURONTIN) 300 MG capsule Take 300 mg by mouth once daily.    hydrOXYzine pamoate (VISTARIL) 25 MG Cap 1 pill 3x a day for 2 days, rest left over every 8 hours as needed for migraine (Patient taking differently: Take 25 mg by mouth once daily. 1 pill 3x a day for 2 days, rest left over every 8 hours as needed for migraine)    hydrOXYzine pamoate (VISTARIL) 25 MG Cap Take 1 capsule (25 mg total) by mouth 3 (three) times daily as needed.    irbesartan (AVAPRO) 300 MG tablet Take 300 mg by mouth once daily.    isosorbide mononitrate (IMDUR) 60 MG 24 hr tablet Take 60 mg by mouth every morning.    lidocaine HCl 3 % Crea APPLY 2 grams to the painful area THREE TIMES A DAY    meloxicam (MOBIC) 7.5 MG tablet Take 7.5 mg by mouth 2 (two) times daily.     metoprolol succinate (TOPROL-XL) 25 MG 24 hr tablet Take 25 mg by mouth 2 (two) times daily.     midodrine (PROAMATINE) 2.5 MG Tab Take 2.5 mg by mouth 2 (two) times daily with meals.     mirabegron (MYRBETRIQ) 25 mg Tb24 ER tablet Take 1 tablet (25 mg total) by mouth once daily.    mupirocin (BACTROBAN) 2 % ointment 2 (two) times daily.    nitroGLYCERIN (NITROSTAT) 0.4 MG SL tablet Place 0.4 mg under the tongue every 5 (five) minutes as needed for Chest pain.    NON FORMULARY MEDICATION Apply topically 4 (four) times daily as needed. Gabapentin 5%, Orphenadrine 2%, Lidocaine 2%, Prilocaine 2%    omega-3 acid ethyl esters (LOVAZA) 1 gram capsule Take 2 capsules by mouth 2 (two) times daily.    oxycodone-acetaminophen (PERCOCET)  mg per tablet Take 1 tablet by mouth every 4 (four) hours as needed for Pain.    pravastatin (PRAVACHOL) 80 MG tablet Take 80 mg by mouth once daily.    RANEXA 1,000 mg Tb12 Take 1,000 mg by mouth 2 (two) times daily.  "   RESTASIS 0.05 % ophthalmic emulsion Place 1 drop into both eyes 2 (two) times daily.    tamsulosin (FLOMAX) 0.4 mg Cap Take 1 capsule (0.4 mg total) by mouth once daily.    tizanidine (ZANAFLEX) 4 MG tablet ONE TABLET BY MOUTH IN THE EVENING    traZODone (DESYREL) 150 MG tablet Take 150 mg or 300 mg at bedtime for sleep    XARELTO 15 mg Tab ONE TABLET BY MOUTH TWICE DAILY FOR 21 DAYS    ALPRAZolam (XANAX) 0.5 MG tablet Take 1 tablet (0.5 mg total) by mouth 2 (two) times daily as needed for Anxiety.    oxybutynin (DITROPAN-XL) 5 MG TR24 Take 1 tablet (5 mg total) by mouth once daily.     No current facility-administered medications for this visit.        Review of Systems   Constitutional: Negative for activity change, fatigue, fever and unexpected weight change.   HENT: Positive for voice change. Negative for congestion.    Eyes: Negative for redness.   Respiratory: Negative for chest tightness and shortness of breath.    Cardiovascular: Negative for chest pain and leg swelling.   Gastrointestinal: Negative for abdominal pain, constipation, diarrhea, nausea and vomiting.   Genitourinary: Positive for frequency and testicular pain. Negative for dysuria, flank pain, hematuria, penile pain, penile swelling, scrotal swelling and urgency.   Musculoskeletal: Positive for back pain. Negative for arthralgias.   Neurological: Negative for dizziness and light-headedness.   Psychiatric/Behavioral: Negative for behavioral problems and confusion. The patient is not nervous/anxious.    All other systems reviewed and are negative.      Objective:      Vitals:    04/02/19 1330   Weight: 111.6 kg (246 lb)   Height: 5' 9" (1.753 m)     Physical Exam   Nursing note and vitals reviewed.  Constitutional: He is oriented to person, place, and time. He appears well-developed and well-nourished.   HENT:   Head: Normocephalic.   Eyes: Conjunctivae are normal.   Neck: Normal range of motion. Neck supple. No tracheal deviation " present. No thyromegaly present.   Cardiovascular: Normal rate and normal heart sounds.    Pulmonary/Chest: Effort normal and breath sounds normal. No respiratory distress. He has no wheezes.   Abdominal: Soft. Bowel sounds are normal. There is no hepatosplenomegaly. There is no tenderness. There is no rebound and no CVA tenderness. No hernia.   Genitourinary: Testes normal and penis normal.   Genitourinary Comments: Bilateral tenderness, bilateral epididymal cysts r>l   Musculoskeletal: Normal range of motion. He exhibits no edema or tenderness.   Lymphadenopathy:     He has no cervical adenopathy.   Neurological: He is alert and oriented to person, place, and time.   Skin: Skin is warm and dry. No rash noted. No erythema.     Psychiatric: He has a normal mood and affect. His behavior is normal. Judgment and thought content normal.       Urine dipstick shows negative for all components.  Micro exam: negative for WBC's or RBC's.    Assessment:       1. Orchalgia    2. Urinary urgency    3. Nocturia more than twice per night    4. Urinary frequency    5. BPH without obstruction/lower urinary tract symptoms          Plan:       1. Orchalgia  Avoid surgery  Pain medication per Dr. Crowley  Consider a block per Pain Management     - US Scrotum And Testicles; Future  - POCT urinalysis, dipstick or tablet reag    2. Urinary urgency    - oxybutynin (DITROPAN-XL) 5 MG TR24; Take 1 tablet (5 mg total) by mouth once daily.  Dispense: 90 tablet; Refill: 3    3. Nocturia more than twice per night  Limit evening fluids    4. Urinary frequency    - mirabegron (MYRBETRIQ) 25 mg Tb24 ER tablet; Take 1 tablet (25 mg total) by mouth once daily.  Dispense: 90 tablet; Refill: 3    5. BPH without obstruction/lower urinary tract symptoms    - tamsulosin (FLOMAX) 0.4 mg Cap; Take 1 capsule (0.4 mg total) by mouth once daily.  Dispense: 90 capsule; Refill: 3            Follow up in about 3 months (around 7/2/2019) for Follow up, Review  X-ray.

## 2019-05-02 DIAGNOSIS — R49.9 VOICE DISORDER: Primary | ICD-10-CM

## 2019-05-02 DIAGNOSIS — J38.1 VOCAL CORD POLYPS: ICD-10-CM

## 2019-05-23 ENCOUNTER — TELEPHONE (OUTPATIENT)
Dept: PSYCHIATRY | Facility: CLINIC | Age: 59
End: 2019-05-23

## 2019-06-05 ENCOUNTER — HOSPITAL ENCOUNTER (OUTPATIENT)
Dept: RADIOLOGY | Facility: HOSPITAL | Age: 59
Discharge: HOME | End: 2019-06-05
Attending: UROLOGY
Payer: MEDICARE

## 2019-06-05 DIAGNOSIS — N50.819 ORCHALGIA: ICD-10-CM

## 2019-06-05 PROCEDURE — 76870 US EXAM SCROTUM: CPT | Mod: 26,,, | Performed by: RADIOLOGY

## 2019-06-05 PROCEDURE — 76870 US EXAM SCROTUM: CPT | Mod: TC

## 2019-06-05 PROCEDURE — 76870 US SCROTUM AND TESTICLES: ICD-10-PCS | Mod: 26,,, | Performed by: RADIOLOGY

## 2019-06-13 ENCOUNTER — CLINICAL SUPPORT (OUTPATIENT)
Dept: SPEECH THERAPY | Facility: HOSPITAL | Age: 59
End: 2019-06-13
Payer: MEDICARE

## 2019-06-13 DIAGNOSIS — J38.1 VOCAL CORD POLYPS: ICD-10-CM

## 2019-06-13 DIAGNOSIS — R49.9 VOICE DISORDER: ICD-10-CM

## 2019-06-13 PROCEDURE — 92524 BEHAVRAL QUALIT ANALYS VOICE: CPT | Mod: GN | Performed by: SPEECH-LANGUAGE PATHOLOGIST

## 2019-06-13 PROCEDURE — 31579 LARYNGOSCOPY TELESCOPIC: CPT | Mod: GN | Performed by: SPEECH-LANGUAGE PATHOLOGIST

## 2019-06-13 NOTE — PROGRESS NOTES
Referring provider: Dr. Dipak Borrego Jr.  Reason for visit:  Behavioral and qualitative analysis of voice and resonance with videostroboscopy (CPT 89764, 15820)     Subjective / History    Mr. Min Nunez is a 58 y.o. male referred for voice evaluation (CPT 31684, 93690) by Dr. Borrego.  He presents with complaints of hoarseness which began following ACDF about 8 months ago.  The patient also reported the following complaints:  voice worse in afternoon/evening and reduced volume.  Sometimes his voice is worse in the morning.  He had a whisper voice following his surgery and only began feeling his voice starting to return a few weeks ago.  He still reports his voice is quiet and hoarse.  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  He has been scoped 2x before but no strobe on record.    Swallowing: reports intermittent sensation of food getting caught/ wrong pipe  Breathing: no c/o    Smoking: quit 5 years ago  Caffeine: <1 cups/day   Reflux: no  Water: 3 bottles/day     Past Medical History:   Diagnosis Date    Arthritis     Black-out (not amnesia)     Cardiac angina     pt states intermittent since 2014. Regularly sees Dr. Flaherty    Coronary artery disease     Hearing loss     Hypertension     Neck problem     problems C5-6-pain radiates to both arms and down spine    Stroke     mini  stroke end of 2014 or beginning of 2015     Current Outpatient Medications on File Prior to Visit   Medication Sig Dispense Refill    albuterol (PROAIR HFA) 90 mcg/actuation inhaler Inhale 2 puffs into the lungs every 6 (six) hours as needed for Wheezing. 18 g 0    ALPRAZolam (XANAX) 0.5 MG tablet Take 1 tablet (0.5 mg total) by mouth 2 (two) times daily as needed for Anxiety. 30 tablet 2    busPIRone (BUSPAR) 15 MG tablet Take 1 tablet (15 mg total) by mouth 3 (three) times daily. 90 tablet 3    chlorhexidine (PERIDEX) 0.12 % solution swish AND SPIT FIFTEEN MILLILITERS BY MOUTH  TWICE DAILY  0    citalopram (CELEXA) 40 MG tablet Take 1 tablet (40 mg total) by mouth once daily. 30 tablet 3    divalproex (DEPAKOTE) 250 MG EC tablet 1 pill in morning, 1 pill in evening (Patient taking differently: Take 500 mg by mouth 2 (two) times daily. ) 60 tablet 6    fenofibrate 160 MG Tab Take 160 mg by mouth once daily.      gabapentin (NEURONTIN) 300 MG capsule Take 300 mg by mouth once daily.      hydrOXYzine pamoate (VISTARIL) 25 MG Cap 1 pill 3x a day for 2 days, rest left over every 8 hours as needed for migraine (Patient taking differently: Take 25 mg by mouth once daily. 1 pill 3x a day for 2 days, rest left over every 8 hours as needed for migraine) 15 capsule 3    hydrOXYzine pamoate (VISTARIL) 25 MG Cap Take 1 capsule (25 mg total) by mouth 3 (three) times daily as needed. 30 capsule 3    irbesartan (AVAPRO) 300 MG tablet Take 300 mg by mouth once daily.      isosorbide mononitrate (IMDUR) 60 MG 24 hr tablet Take 60 mg by mouth every morning.  3    lidocaine HCl 3 % Crea APPLY 2 grams to the painful area THREE TIMES A DAY  3    meloxicam (MOBIC) 7.5 MG tablet Take 7.5 mg by mouth 2 (two) times daily.       metoprolol succinate (TOPROL-XL) 25 MG 24 hr tablet Take 25 mg by mouth 2 (two) times daily.       midodrine (PROAMATINE) 2.5 MG Tab Take 2.5 mg by mouth 2 (two) times daily with meals.       mirabegron (MYRBETRIQ) 25 mg Tb24 ER tablet Take 1 tablet (25 mg total) by mouth once daily. 90 tablet 3    mupirocin (BACTROBAN) 2 % ointment 2 (two) times daily.  0    nitroGLYCERIN (NITROSTAT) 0.4 MG SL tablet Place 0.4 mg under the tongue every 5 (five) minutes as needed for Chest pain.      NON FORMULARY MEDICATION Apply topically 4 (four) times daily as needed. Gabapentin 5%, Orphenadrine 2%, Lidocaine 2%, Prilocaine 2%      omega-3 acid ethyl esters (LOVAZA) 1 gram capsule Take 2 capsules by mouth 2 (two) times daily.  3    oxybutynin (DITROPAN-XL) 5 MG TR24 Take 1 tablet (5 mg  total) by mouth once daily. 90 tablet 3    oxycodone-acetaminophen (PERCOCET)  mg per tablet Take 1 tablet by mouth every 4 (four) hours as needed for Pain. 30 tablet 0    pravastatin (PRAVACHOL) 80 MG tablet Take 80 mg by mouth once daily.  4    RANEXA 1,000 mg Tb12 Take 1,000 mg by mouth 2 (two) times daily.  3    RESTASIS 0.05 % ophthalmic emulsion Place 1 drop into both eyes 2 (two) times daily.  4    tamsulosin (FLOMAX) 0.4 mg Cap Take 1 capsule (0.4 mg total) by mouth once daily. 90 capsule 3    tizanidine (ZANAFLEX) 4 MG tablet ONE TABLET BY MOUTH IN THE EVENING  2    traZODone (DESYREL) 150 MG tablet Take 150 mg or 300 mg at bedtime for sleep 45 tablet 3    XARELTO 15 mg Tab ONE TABLET BY MOUTH TWICE DAILY FOR 21 DAYS  0     No current facility-administered medications on file prior to visit.        Objective    Perceptual/behavioral assessment  -CAPE-V Overall Score: 51  -Quality: rough and breathy  -Volume: decreased projection  -Pitch: appropriate for age and gender  -Flexibility: diminished  -Habitual respiratory pattern: chest/clavicular.    Flexible Laryngeal Videostroboscopy (47923): Laryngeal videostroboscopy is indicated to assess laryngeal vibratory biomechanics and vocal fold oscillation, which cannot be assessed with a plain light examination. This was carried out transnasally with a distal chip videoendoscope. After verbal consent was obtained, the patient was positioned and the nose was topically decongested with 1% phenylephrine and topically anesthetized with 4% lidocaine. The endoscope was passed through the most patent nasal cavity and positioned to image the nasopharynx, larynx, and hypopharynx in detail. The following features were examined: nasopharyngeal, laryngeal, hypopharyngeal masses; velopharyngeal strength, closure, and symmetry of motion; vocal fold range and symmetry of motion; laryngeal mucosal edema, erythema, inflammation, and hydration; salivary pooling; and  gross laryngeal sensation. During phonation, the vocal folds were assessed for glottal closure; mucosal wave; vocal fold lesions; vibratory periodicity, amplitude, and phase symmetry; and vertical height match. The equipment was removed. The patient tolerated the procedure well without complication. All findings were normal except:  - right vocal fold with reduced/absent ad/abduction  - left vocal fold motion intact  - reduced/touch glottic closure across pitch range; mildly improved in high/falsetto range  - reduced R TVF mucosal wave with slightly bowing  - AP/concentric hyperfunction with prolonged voice tasks  - very mild phonotraumatic-appearing changes on the mid membranous L TVF without appearance of organized excrescence or hemorrhage     Education / Stimulability Trials   Discussed importance of vocal hygiene including: hydration. Patient was only modestly stimulable for improved voice quality uses projected/exuberant voice exercises, but these were noted to cause breathlessness more quickly.  Very mild stimulability with SOVT /u/ sound but also noted pt to lose breath support quickly, like due to findings on stroboscopy consistent with right vocal fold immobility resulting in reduced TVF closure.      Assessment     Mr. 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 Borrego.  Prognosis for continued improvement with therapy alone is poor.     Recommendations / POC    I explained the findings to the patient and his mother.  We decided to defer on voice therapy at this time since his voice has improved over the last few weeks and due to the structural nature of the voice issue.  Recommend f/u with Dr. Borrego to discuss surgical or procedural options to address glottic insufficiency.  Pending progress with surgical/procedural/no intervention, he may benefit from 1-2 sessions of voice/speech therapy over 2-4 weeks with a speech-language pathologist  to optimize glottal postures for improved vocal function, vocal efficiency, and ease of phonation.  He should continue the exercises as discussed in session and should contact me with any further questions.

## 2019-06-18 RX ORDER — BUSPIRONE HYDROCHLORIDE 15 MG/1
TABLET ORAL
Qty: 90 TABLET | Refills: 3 | Status: SHIPPED | OUTPATIENT
Start: 2019-06-18 | End: 2019-10-01 | Stop reason: SDUPTHER

## 2019-06-26 ENCOUNTER — OFFICE VISIT (OUTPATIENT)
Dept: PSYCHIATRY | Facility: CLINIC | Age: 59
End: 2019-06-26
Payer: MEDICARE

## 2019-06-26 VITALS
WEIGHT: 250.75 LBS | DIASTOLIC BLOOD PRESSURE: 80 MMHG | HEART RATE: 82 BPM | BODY MASS INDEX: 37.14 KG/M2 | SYSTOLIC BLOOD PRESSURE: 135 MMHG | HEIGHT: 69 IN

## 2019-06-26 DIAGNOSIS — F32.A DEPRESSIVE DISORDER: Primary | ICD-10-CM

## 2019-06-26 PROCEDURE — 99213 OFFICE O/P EST LOW 20 MIN: CPT | Mod: S$PBB,,, | Performed by: PSYCHIATRY & NEUROLOGY

## 2019-06-26 PROCEDURE — 99212 OFFICE O/P EST SF 10 MIN: CPT | Mod: PBBFAC | Performed by: PSYCHIATRY & NEUROLOGY

## 2019-06-26 PROCEDURE — 99999 PR PBB SHADOW E&M-EST. PATIENT-LVL II: CPT | Mod: PBBFAC,,, | Performed by: PSYCHIATRY & NEUROLOGY

## 2019-06-26 PROCEDURE — 99213 PR OFFICE/OUTPT VISIT, EST, LEVL III, 20-29 MIN: ICD-10-PCS | Mod: S$PBB,,, | Performed by: PSYCHIATRY & NEUROLOGY

## 2019-06-26 PROCEDURE — 99999 PR PBB SHADOW E&M-EST. PATIENT-LVL II: ICD-10-PCS | Mod: PBBFAC,,, | Performed by: PSYCHIATRY & NEUROLOGY

## 2019-06-26 RX ORDER — TRAZODONE HYDROCHLORIDE 150 MG/1
TABLET ORAL
Qty: 45 TABLET | Refills: 3 | Status: SHIPPED | OUTPATIENT
Start: 2019-06-26 | End: 2019-10-01 | Stop reason: SDUPTHER

## 2019-06-26 RX ORDER — CITALOPRAM 40 MG/1
40 TABLET, FILM COATED ORAL DAILY
Qty: 30 TABLET | Refills: 4 | Status: SHIPPED | OUTPATIENT
Start: 2019-06-26 | End: 2019-10-01 | Stop reason: SDUPTHER

## 2019-06-26 RX ORDER — ALPRAZOLAM 0.5 MG/1
0.5 TABLET ORAL 2 TIMES DAILY PRN
Qty: 30 TABLET | Refills: 0 | Status: SHIPPED | OUTPATIENT
Start: 2019-06-26 | End: 2019-10-01 | Stop reason: SDUPTHER

## 2019-06-26 NOTE — PROGRESS NOTES
ESTABLISHED OUTPATIENT VISIT   E/M LEVEL 3: 37811    ENCOUNTER DATE: 6/26/2019  SITE: [facility], [address]    HISTORY    CHIEF COMPLAINT   Min Nunez is a 58 y.o. male who presents for follow up of anxiety/depression.      HPI    Now able to talk again.    Physical issues remain troublesome, affect mood.     Overall appears stable psychiatrically.    Psychiatric Review Of Systems - Is patient experiencing or having changes in:  sleep: varies  appetite: no  weight: has gained weight  energy/anergy: no  interest/pleasure/anhedonia: no  somatic symptoms: no  libido: no  anxiety/panic: anxiety at times  guilty/hopelessness: no  concentration: no  S.I.B.s/risky behavior: no  Irritability: no  Racing thoughts: no  Impulsive behaviors: no  Paranoia:no  AVH:no    Recent alcohol: no  Recent drug: no    Medical ROS   Multiple physical problems     PAST MEDICAL, FAMILY AND SOCIAL HISTORY: The patient's past medical, family and social history have been reviewed and updated as appropriate within the electronic medical record - see encounter notes.    PSYCHOTROPIC MEDICATIONS   Trazodone 150-300 mg at bedtime, Buspar 15 mg tid, Celexa 40 mg qam, Depakote 500 mg qam, Depakote 500 mg at bedtime[for headaches], Neurontin 300 mg tid, Hydroxyzine prn for migraines, Xanax 0.25-0.5 mg prn[takes rarely]    Scheduled and PRN Medications     Current Outpatient Medications:     albuterol (PROAIR HFA) 90 mcg/actuation inhaler, Inhale 2 puffs into the lungs every 6 (six) hours as needed for Wheezing., Disp: 18 g, Rfl: 0    ALPRAZolam (XANAX) 0.5 MG tablet, Take 1 tablet (0.5 mg total) by mouth 2 (two) times daily as needed for Anxiety., Disp: 30 tablet, Rfl: 2    busPIRone (BUSPAR) 15 MG tablet, TAKE ONE TABLET BY MOUTH THREE TIMES A DAY, Disp: 90 tablet, Rfl: 3    chlorhexidine (PERIDEX) 0.12 % solution, swish AND SPIT FIFTEEN MILLILITERS BY MOUTH TWICE DAILY, Disp: , Rfl: 0    citalopram (CELEXA) 40 MG tablet,  Take 1 tablet (40 mg total) by mouth once daily., Disp: 30 tablet, Rfl: 3    divalproex (DEPAKOTE) 250 MG EC tablet, 1 pill in morning, 1 pill in evening (Patient taking differently: Take 500 mg by mouth 2 (two) times daily. ), Disp: 60 tablet, Rfl: 6    fenofibrate 160 MG Tab, Take 160 mg by mouth once daily., Disp: , Rfl:     gabapentin (NEURONTIN) 300 MG capsule, Take 300 mg by mouth once daily., Disp: , Rfl:     hydrOXYzine pamoate (VISTARIL) 25 MG Cap, 1 pill 3x a day for 2 days, rest left over every 8 hours as needed for migraine (Patient taking differently: Take 25 mg by mouth once daily. 1 pill 3x a day for 2 days, rest left over every 8 hours as needed for migraine), Disp: 15 capsule, Rfl: 3    hydrOXYzine pamoate (VISTARIL) 25 MG Cap, Take 1 capsule (25 mg total) by mouth 3 (three) times daily as needed., Disp: 30 capsule, Rfl: 3    irbesartan (AVAPRO) 300 MG tablet, Take 300 mg by mouth once daily., Disp: , Rfl:     isosorbide mononitrate (IMDUR) 60 MG 24 hr tablet, Take 60 mg by mouth every morning., Disp: , Rfl: 3    lidocaine HCl 3 % Crea, APPLY 2 grams to the painful area THREE TIMES A DAY, Disp: , Rfl: 3    meloxicam (MOBIC) 7.5 MG tablet, Take 7.5 mg by mouth 2 (two) times daily. , Disp: , Rfl:     metoprolol succinate (TOPROL-XL) 25 MG 24 hr tablet, Take 25 mg by mouth 2 (two) times daily. , Disp: , Rfl:     midodrine (PROAMATINE) 2.5 MG Tab, Take 2.5 mg by mouth 2 (two) times daily with meals. , Disp: , Rfl:     mirabegron (MYRBETRIQ) 25 mg Tb24 ER tablet, Take 1 tablet (25 mg total) by mouth once daily., Disp: 90 tablet, Rfl: 3    mupirocin (BACTROBAN) 2 % ointment, 2 (two) times daily., Disp: , Rfl: 0    nitroGLYCERIN (NITROSTAT) 0.4 MG SL tablet, Place 0.4 mg under the tongue every 5 (five) minutes as needed for Chest pain., Disp: , Rfl:     NON FORMULARY MEDICATION, Apply topically 4 (four) times daily as needed. Gabapentin 5%, Orphenadrine 2%, Lidocaine 2%, Prilocaine 2%, Disp:  , Rfl:     omega-3 acid ethyl esters (LOVAZA) 1 gram capsule, Take 2 capsules by mouth 2 (two) times daily., Disp: , Rfl: 3    oxybutynin (DITROPAN-XL) 5 MG TR24, Take 1 tablet (5 mg total) by mouth once daily., Disp: 90 tablet, Rfl: 3    oxycodone-acetaminophen (PERCOCET)  mg per tablet, Take 1 tablet by mouth every 4 (four) hours as needed for Pain., Disp: 30 tablet, Rfl: 0    pravastatin (PRAVACHOL) 80 MG tablet, Take 80 mg by mouth once daily., Disp: , Rfl: 4    RANEXA 1,000 mg Tb12, Take 1,000 mg by mouth 2 (two) times daily., Disp: , Rfl: 3    RESTASIS 0.05 % ophthalmic emulsion, Place 1 drop into both eyes 2 (two) times daily., Disp: , Rfl: 4    tamsulosin (FLOMAX) 0.4 mg Cap, Take 1 capsule (0.4 mg total) by mouth once daily., Disp: 90 capsule, Rfl: 3    tizanidine (ZANAFLEX) 4 MG tablet, ONE TABLET BY MOUTH IN THE EVENING, Disp: , Rfl: 2    traZODone (DESYREL) 150 MG tablet, Take 150 mg or 300 mg at bedtime for sleep, Disp: 45 tablet, Rfl: 3    XARELTO 15 mg Tab, ONE TABLET BY MOUTH TWICE DAILY FOR 21 DAYS, Disp: , Rfl: 0    EXAMINATION    Vitals:    06/26/19 1301   BP: 135/80   Pulse: 82     Weight 250 lb's    CONSTITUTIONAL  General Appearance: well nourished    MUSCULOSKELETAL  Muscle Strength and Tone: normal strength and tone  Abnormal Involuntary Movements: no abnormal movement noted  Gait and Station: normal gait    PSYCHIATRIC   Level of Consciousness: alert  Orientation: oriented to person, place and time  Grooming: well groomed  Psychomotor Behavior: no restlessness/agitation  Speech: normal in rate, rhythm and volume  Language: normal vocabulary  Mood: anxious at times  Affect: full range and appropriate  Thought Process: logical and goal directed  Associations: intact associations  Thought Content: no SI/HI  Memory: grossly intact  Attention: intact to content of interview  Fund of Knowledge: appears adequate  Insight: good  Judgement: good    MEDICAL DECISION  MAKING    DIAGNOSES  Anxiety d/o, unspecified    PROBLEM LIST AND MANAGEMENT PLANS    - anxiety:   Continue Celexa, Buspar, Trazodone and prn Xanax as above for now  - rtc 3 months     Time with patient: 20 min    LABORATORY DATA  Office Visit on 04/02/2019   Component Date Value Ref Range Status    Color, UA 04/02/2019 yellow   Final    Spec Grav UA 04/02/2019 1,000   Final    pH, UA 04/02/2019 5   Final    WBC, UA 04/02/2019 neg   Final    Nitrite, UA 04/02/2019 neg   Final    Protein 04/02/2019 neg   Final    Glucose, UA 04/02/2019 neg   Final    Ketones, UA 04/02/2019 neg   Final    Urobilinogen, UA 04/02/2019 neg   Final    Bilirubin 04/02/2019 neg   Final    Blood, UA 04/02/2019 neg   Final           Sampson Luke

## 2019-07-08 ENCOUNTER — OFFICE VISIT (OUTPATIENT)
Dept: UROLOGY | Facility: CLINIC | Age: 59
End: 2019-07-08
Payer: MEDICARE

## 2019-07-08 VITALS
WEIGHT: 246 LBS | HEIGHT: 69 IN | SYSTOLIC BLOOD PRESSURE: 132 MMHG | BODY MASS INDEX: 36.43 KG/M2 | DIASTOLIC BLOOD PRESSURE: 70 MMHG

## 2019-07-08 DIAGNOSIS — R39.15 URINARY URGENCY: ICD-10-CM

## 2019-07-08 DIAGNOSIS — R35.1 NOCTURIA MORE THAN TWICE PER NIGHT: ICD-10-CM

## 2019-07-08 DIAGNOSIS — N50.819 ORCHALGIA: Primary | ICD-10-CM

## 2019-07-08 PROCEDURE — 99214 PR OFFICE/OUTPT VISIT, EST, LEVL IV, 30-39 MIN: ICD-10-PCS | Mod: S$PBB,,, | Performed by: UROLOGY

## 2019-07-08 PROCEDURE — 99999 PR PBB SHADOW E&M-EST. PATIENT-LVL III: CPT | Mod: PBBFAC,,, | Performed by: UROLOGY

## 2019-07-08 PROCEDURE — 99999 PR PBB SHADOW E&M-EST. PATIENT-LVL III: ICD-10-PCS | Mod: PBBFAC,,, | Performed by: UROLOGY

## 2019-07-08 PROCEDURE — 99213 OFFICE O/P EST LOW 20 MIN: CPT | Mod: PBBFAC | Performed by: UROLOGY

## 2019-07-08 PROCEDURE — 99214 OFFICE O/P EST MOD 30 MIN: CPT | Mod: S$PBB,,, | Performed by: UROLOGY

## 2019-07-08 RX ORDER — OXYBUTYNIN CHLORIDE 10 MG/1
10 TABLET, EXTENDED RELEASE ORAL DAILY
Qty: 90 TABLET | Refills: 3 | Status: SHIPPED | OUTPATIENT
Start: 2019-07-08 | End: 2020-02-11 | Stop reason: SDUPTHER

## 2019-07-08 NOTE — PROGRESS NOTES
Subjective:       Patient ID: Min Nunez is a 58 y.o. male who was last seen in this office 4/2/2019    Chief Complaint:   Chief Complaint   Patient presents with    Follow-up       Orchalgia  He had a bilateral epididymectomy on 8/5/2016.  He continues to have right sided swelling and bilateral pain.  His right sided pain is worse than left.   He complains of pain with sitting, standing, getting up to stand, after roly movements, lifting, and walking.  He has difficulty walking or performing exercises.       He had severe left sided pain recently.  It seems to be better today. He has mentioned a block with Dr. Crowley, but they are hesitant to proceed.      Urinary frequency and Urgency Incontinence  He has some frequency and urgency that is managed with Ditropan XL 5 mg and Myrbetriq 50 mg and Flomax.  Ves[employer] is no longer covered.  He would like to try something stronger.      ACTIVE MEDICAL ISSUES:  Patient Active Problem List   Diagnosis    Chest pain, exertional    Coronary artery disease, occlusive    Coronary atherosclerosis    Orchalgia    Dizziness    Syncope    Abnormal brain MRI    Migraines    Atypical migraine    HA (headache)    Numbness    Facial droop    Faintness    Groin pain    Constipation    Hypotension    Urinary urgency    Nocturia more than twice per night    Pneumonia of right lower lobe due to infectious organism    Hypertension    Mixed hyperlipidemia    Morbid obesity    Anxiety       ALLERGIES AND MEDICATIONS: updated and reviewed.  Review of patient's allergies indicates:  No Known Allergies  Current Outpatient Medications   Medication Sig    albuterol (PROAIR HFA) 90 mcg/actuation inhaler Inhale 2 puffs into the lungs every 6 (six) hours as needed for Wheezing.    ALPRAZolam (XANAX) 0.5 MG tablet Take 1 tablet (0.5 mg total) by mouth 2 (two) times daily as needed for Anxiety.    busPIRone (BUSPAR) 15 MG tablet TAKE ONE TABLET BY MOUTH THREE TIMES A DAY     chlorhexidine (PERIDEX) 0.12 % solution swish AND SPIT FIFTEEN MILLILITERS BY MOUTH TWICE DAILY    citalopram (CELEXA) 40 MG tablet Take 1 tablet (40 mg total) by mouth once daily.    divalproex (DEPAKOTE) 250 MG EC tablet 1 pill in morning, 1 pill in evening (Patient taking differently: Take 500 mg by mouth 2 (two) times daily. )    fenofibrate 160 MG Tab Take 160 mg by mouth once daily.    gabapentin (NEURONTIN) 300 MG capsule Take 300 mg by mouth once daily.    hydrOXYzine pamoate (VISTARIL) 25 MG Cap 1 pill 3x a day for 2 days, rest left over every 8 hours as needed for migraine (Patient taking differently: Take 25 mg by mouth once daily. 1 pill 3x a day for 2 days, rest left over every 8 hours as needed for migraine)    hydrOXYzine pamoate (VISTARIL) 25 MG Cap Take 1 capsule (25 mg total) by mouth 3 (three) times daily as needed.    irbesartan (AVAPRO) 300 MG tablet Take 300 mg by mouth once daily.    isosorbide mononitrate (IMDUR) 60 MG 24 hr tablet Take 60 mg by mouth every morning.    lidocaine HCl 3 % Crea APPLY 2 grams to the painful area THREE TIMES A DAY    meloxicam (MOBIC) 7.5 MG tablet Take 7.5 mg by mouth 2 (two) times daily.     metoprolol succinate (TOPROL-XL) 25 MG 24 hr tablet Take 25 mg by mouth 2 (two) times daily.     midodrine (PROAMATINE) 2.5 MG Tab Take 2.5 mg by mouth 2 (two) times daily with meals.     mirabegron (MYRBETRIQ) 25 mg Tb24 ER tablet Take 1 tablet (25 mg total) by mouth once daily.    mupirocin (BACTROBAN) 2 % ointment 2 (two) times daily.    nitroGLYCERIN (NITROSTAT) 0.4 MG SL tablet Place 0.4 mg under the tongue every 5 (five) minutes as needed for Chest pain.    NON FORMULARY MEDICATION Apply topically 4 (four) times daily as needed. Gabapentin 5%, Orphenadrine 2%, Lidocaine 2%, Prilocaine 2%    omega-3 acid ethyl esters (LOVAZA) 1 gram capsule Take 2 capsules by mouth 2 (two) times daily.    oxybutynin (DITROPAN-XL) 10 MG 24 hr tablet Take 1 tablet  "(10 mg total) by mouth once daily.    oxycodone-acetaminophen (PERCOCET)  mg per tablet Take 1 tablet by mouth every 4 (four) hours as needed for Pain.    pravastatin (PRAVACHOL) 80 MG tablet Take 80 mg by mouth once daily.    RANEXA 1,000 mg Tb12 Take 1,000 mg by mouth 2 (two) times daily.    RESTASIS 0.05 % ophthalmic emulsion Place 1 drop into both eyes 2 (two) times daily.    tamsulosin (FLOMAX) 0.4 mg Cap Take 1 capsule (0.4 mg total) by mouth once daily.    tizanidine (ZANAFLEX) 4 MG tablet ONE TABLET BY MOUTH IN THE EVENING    traZODone (DESYREL) 150 MG tablet Take 150 mg or 300 mg at bedtime for sleep    XARELTO 15 mg Tab ONE TABLET BY MOUTH TWICE DAILY FOR 21 DAYS     No current facility-administered medications for this visit.        Review of Systems   Constitutional: Negative for activity change, fatigue, fever and unexpected weight change.   HENT: Negative for congestion.    Eyes: Negative for redness.   Respiratory: Negative for chest tightness and shortness of breath.    Cardiovascular: Negative for chest pain and leg swelling.   Gastrointestinal: Negative for abdominal pain, constipation, diarrhea, nausea and vomiting.   Genitourinary: Negative for dysuria, flank pain, frequency, hematuria, penile pain, penile swelling, scrotal swelling, testicular pain and urgency.   Musculoskeletal: Negative for arthralgias and back pain.   Neurological: Negative for dizziness and light-headedness.   Psychiatric/Behavioral: Negative for behavioral problems and confusion. The patient is not nervous/anxious.    All other systems reviewed and are negative.      Objective:      Vitals:    07/08/19 1348   BP: 132/70   Weight: 111.6 kg (246 lb)   Height: 5' 9" (1.753 m)     Physical Exam   Nursing note and vitals reviewed.  Constitutional: He is oriented to person, place, and time. He appears well-developed and well-nourished.   HENT:   Head: Normocephalic.   Eyes: Conjunctivae are normal.   Neck: Normal " range of motion. Neck supple. No tracheal deviation present. No thyromegaly present.   Cardiovascular: Normal rate and normal heart sounds.    Pulmonary/Chest: Effort normal and breath sounds normal. No respiratory distress. He has no wheezes.   Abdominal: Soft. Bowel sounds are normal. There is no hepatosplenomegaly. There is no tenderness. There is no rebound and no CVA tenderness. No hernia.   Genitourinary: Penis normal. Right testis shows tenderness. Right testis shows no swelling. Left testis shows tenderness. Left testis shows no swelling. Circumcised.   Genitourinary Comments: Right epididymal cysts   Musculoskeletal: Normal range of motion. He exhibits no edema or tenderness.   Lymphadenopathy:     He has no cervical adenopathy.   Neurological: He is alert and oriented to person, place, and time.   Skin: Skin is warm and dry. No rash noted. No erythema.     Psychiatric: He has a normal mood and affect. His behavior is normal. Judgment and thought content normal.       Urine dipstick shows negative for all components.  Micro exam: negative for WBC's or RBC's.      US Scrotum And Testicles   Order: [id number]   Status:  Final result   Visible to patient:  Yes (Patient Portal)   Next appt:  10/01/2019 at 02:00 PM in Psychiatry (Sampson Luke MD)   Dx:  Orchalgia   Details     Reading Physician Reading Date Result Priority   Tim Frey MD 6/5/2019       Narrative     EXAMINATION:  US SCROTUM AND TESTICLES    CLINICAL HISTORY:  Testicular pain, unspecified    TECHNIQUE:  Sonography of the scrotum and testes.    COMPARISON:  None.    FINDINGS:  Right Testicle:    The right testicle measures 4.3 x 2.4 x 3.1 cm with normal echotexture to the testicular parenchyma.  Normal arterial as well as venous flow in the right testicle.  There is a approximately 3.7 x 3.7 x 5.7 cm (previously 5.3 x 4.7 x 3.7 cm) complex cystic lesion with internal echoes adjacent to the testicle, likely representing a loculated hydrocele  or a spermatocele.  This remains without significant change from the previous study.    Left Testicle:    Left testicle measures 3.9 x 2.6 x 3.1 cm with normal echotexture.  There is normal arterial and venous flow.  No varicocele demonstrated on this study.      Impression       1. Stable appearance of the spermatocele/loculated hydrocele on the right when compared to the previous study.  2. Testicular ultrasound otherwise is unremarkable.      Electronically signed by: Tim Frey MD  Date: 06/05/2019  Time: 14:23            Last Resulted: 06/05/19 14:23             Assessment:       1. Orchalgia    2. Urinary urgency    3. Nocturia more than twice per night          Plan:       1. Urinary urgency    - oxybutynin (DITROPAN-XL) 10 MG 24 hr tablet; Take 1 tablet (10 mg total) by mouth once daily.  Dispense: 90 tablet; Refill: 3    2. Orchalgia  stable    3. Nocturia more than twice per night  Limit evening fluids            Follow up in about 3 months (around 10/8/2019) for Follow up.

## 2019-07-30 DIAGNOSIS — N40.0 BPH WITHOUT OBSTRUCTION/LOWER URINARY TRACT SYMPTOMS: ICD-10-CM

## 2019-07-30 RX ORDER — TAMSULOSIN HYDROCHLORIDE 0.4 MG/1
0.4 CAPSULE ORAL DAILY
Qty: 90 CAPSULE | Refills: 3 | Status: SHIPPED | OUTPATIENT
Start: 2019-07-30 | End: 2020-02-11 | Stop reason: SDUPTHER

## 2019-08-13 RX ORDER — METOPROLOL SUCCINATE 25 MG/1
TABLET, EXTENDED RELEASE ORAL
Qty: 180 TABLET | OUTPATIENT
Start: 2019-08-13

## 2019-09-10 RX ORDER — RIVAROXABAN 20 MG/1
TABLET, FILM COATED ORAL
Qty: 90 TABLET | OUTPATIENT
Start: 2019-09-10

## 2019-09-10 RX ORDER — NITROGLYCERIN 0.4 MG/1
TABLET SUBLINGUAL
Qty: 100 TABLET | OUTPATIENT
Start: 2019-09-10

## 2019-09-11 RX ORDER — IRBESARTAN 300 MG/1
TABLET ORAL
Qty: 90 TABLET | OUTPATIENT
Start: 2019-09-11

## 2019-10-01 ENCOUNTER — OFFICE VISIT (OUTPATIENT)
Dept: PSYCHIATRY | Facility: CLINIC | Age: 59
End: 2019-10-01
Payer: MEDICARE

## 2019-10-01 VITALS
HEIGHT: 69 IN | DIASTOLIC BLOOD PRESSURE: 64 MMHG | SYSTOLIC BLOOD PRESSURE: 117 MMHG | BODY MASS INDEX: 34.96 KG/M2 | HEART RATE: 94 BPM | WEIGHT: 236 LBS

## 2019-10-01 DIAGNOSIS — F41.9 ANXIETY: Primary | ICD-10-CM

## 2019-10-01 PROCEDURE — 99999 PR PBB SHADOW E&M-EST. PATIENT-LVL II: CPT | Mod: PBBFAC,,, | Performed by: PSYCHIATRY & NEUROLOGY

## 2019-10-01 PROCEDURE — 99999 PR PBB SHADOW E&M-EST. PATIENT-LVL II: ICD-10-PCS | Mod: PBBFAC,,, | Performed by: PSYCHIATRY & NEUROLOGY

## 2019-10-01 PROCEDURE — 99213 PR OFFICE/OUTPT VISIT, EST, LEVL III, 20-29 MIN: ICD-10-PCS | Mod: S$PBB,,, | Performed by: PSYCHIATRY & NEUROLOGY

## 2019-10-01 PROCEDURE — 99212 OFFICE O/P EST SF 10 MIN: CPT | Mod: PBBFAC | Performed by: PSYCHIATRY & NEUROLOGY

## 2019-10-01 PROCEDURE — 99213 OFFICE O/P EST LOW 20 MIN: CPT | Mod: S$PBB,,, | Performed by: PSYCHIATRY & NEUROLOGY

## 2019-10-01 RX ORDER — BUSPIRONE HYDROCHLORIDE 15 MG/1
15 TABLET ORAL 3 TIMES DAILY
Qty: 90 TABLET | Refills: 3 | Status: SHIPPED | OUTPATIENT
Start: 2019-10-01 | End: 2019-12-24 | Stop reason: SDUPTHER

## 2019-10-01 RX ORDER — ALPRAZOLAM 0.5 MG/1
0.5 TABLET ORAL 2 TIMES DAILY PRN
Qty: 30 TABLET | Refills: 0 | Status: SHIPPED | OUTPATIENT
Start: 2019-10-01 | End: 2019-11-01 | Stop reason: SDUPTHER

## 2019-10-01 RX ORDER — TRAZODONE HYDROCHLORIDE 150 MG/1
TABLET ORAL
Qty: 45 TABLET | Refills: 3 | Status: SHIPPED | OUTPATIENT
Start: 2019-10-01 | End: 2019-12-24 | Stop reason: SDUPTHER

## 2019-10-01 RX ORDER — CITALOPRAM 40 MG/1
40 TABLET, FILM COATED ORAL DAILY
Qty: 30 TABLET | Refills: 3 | Status: SHIPPED | OUTPATIENT
Start: 2019-10-01 | End: 2019-12-24 | Stop reason: SDUPTHER

## 2019-10-01 NOTE — PROGRESS NOTES
ESTABLISHED OUTPATIENT VISIT   E/M LEVEL 3: 37021    ENCOUNTER DATE: 10/1/2019  SITE: [facility], [address]    HISTORY    CHIEF COMPLAINT   Min Nunez is a 58 y.o. male who presents for follow up of anxiety/depression.      HPI    Voice has improved.    Physical issues remain troublesome, affect mood.     Overall appears stable psychiatrically.    Satisfied with current psychotropic medication regimen.    Psychiatric Review Of Systems - Is patient experiencing or having changes in:  sleep: varies  appetite: no  weight: has lost 14 lb's since previous visit   energy/anergy: no  interest/pleasure/anhedonia: no  somatic symptoms: no  libido: no  anxiety/panic: anxiety at times  guilty/hopelessness: no  concentration: no  S.I.B.s/risky behavior: no  Irritability: no  Racing thoughts: no  Impulsive behaviors: no  Paranoia:no  AVH:no    Recent alcohol: no  Recent drug: no    Medical ROS   Multiple physical problems     PAST MEDICAL, FAMILY AND SOCIAL HISTORY: The patient's past medical, family and social history have been reviewed and updated as appropriate within the electronic medical record - see encounter notes.    PSYCHOTROPIC MEDICATIONS   Trazodone 150-300 mg at bedtime, Buspar 15 mg tid, Celexa 40 mg qam, Depakote 500 mg qam, Depakote 500 mg at bedtime[for headaches], Neurontin 300 mg tid, Hydroxyzine prn for migraines, Xanax 0.25-0.5 mg prn[takes rarely, e.g. 1-2 times per week]    Scheduled and PRN Medications     Current Outpatient Medications:     albuterol (PROAIR HFA) 90 mcg/actuation inhaler, Inhale 2 puffs into the lungs every 6 (six) hours as needed for Wheezing., Disp: 18 g, Rfl: 0    ALPRAZolam (XANAX) 0.5 MG tablet, Take 1 tablet (0.5 mg total) by mouth 2 (two) times daily as needed for Anxiety., Disp: 30 tablet, Rfl: 0    busPIRone (BUSPAR) 15 MG tablet, TAKE ONE TABLET BY MOUTH THREE TIMES A DAY, Disp: 90 tablet, Rfl: 3    chlorhexidine (PERIDEX) 0.12 % solution, swish AND  SPIT FIFTEEN MILLILITERS BY MOUTH TWICE DAILY, Disp: , Rfl: 0    citalopram (CELEXA) 40 MG tablet, Take 1 tablet (40 mg total) by mouth once daily., Disp: 30 tablet, Rfl: 4    divalproex (DEPAKOTE) 250 MG EC tablet, 1 pill in morning, 1 pill in evening (Patient taking differently: Take 500 mg by mouth 2 (two) times daily. ), Disp: 60 tablet, Rfl: 6    fenofibrate 160 MG Tab, Take 160 mg by mouth once daily., Disp: , Rfl:     gabapentin (NEURONTIN) 300 MG capsule, Take 300 mg by mouth once daily., Disp: , Rfl:     hydrOXYzine pamoate (VISTARIL) 25 MG Cap, 1 pill 3x a day for 2 days, rest left over every 8 hours as needed for migraine (Patient taking differently: Take 25 mg by mouth once daily. 1 pill 3x a day for 2 days, rest left over every 8 hours as needed for migraine), Disp: 15 capsule, Rfl: 3    hydrOXYzine pamoate (VISTARIL) 25 MG Cap, Take 1 capsule (25 mg total) by mouth 3 (three) times daily as needed., Disp: 30 capsule, Rfl: 3    irbesartan (AVAPRO) 300 MG tablet, Take 300 mg by mouth once daily., Disp: , Rfl:     isosorbide mononitrate (IMDUR) 60 MG 24 hr tablet, Take 60 mg by mouth every morning., Disp: , Rfl: 3    lidocaine HCl 3 % Crea, APPLY 2 grams to the painful area THREE TIMES A DAY, Disp: , Rfl: 3    meloxicam (MOBIC) 7.5 MG tablet, Take 7.5 mg by mouth 2 (two) times daily. , Disp: , Rfl:     metoprolol succinate (TOPROL-XL) 25 MG 24 hr tablet, Take 25 mg by mouth 2 (two) times daily. , Disp: , Rfl:     midodrine (PROAMATINE) 2.5 MG Tab, Take 2.5 mg by mouth 2 (two) times daily with meals. , Disp: , Rfl:     mirabegron (MYRBETRIQ) 25 mg Tb24 ER tablet, Take 1 tablet (25 mg total) by mouth once daily., Disp: 90 tablet, Rfl: 3    mupirocin (BACTROBAN) 2 % ointment, 2 (two) times daily., Disp: , Rfl: 0    nitroGLYCERIN (NITROSTAT) 0.4 MG SL tablet, Place 0.4 mg under the tongue every 5 (five) minutes as needed for Chest pain., Disp: , Rfl:     NON FORMULARY MEDICATION, Apply  topically 4 (four) times daily as needed. Gabapentin 5%, Orphenadrine 2%, Lidocaine 2%, Prilocaine 2%, Disp: , Rfl:     omega-3 acid ethyl esters (LOVAZA) 1 gram capsule, Take 2 capsules by mouth 2 (two) times daily., Disp: , Rfl: 3    oxybutynin (DITROPAN-XL) 10 MG 24 hr tablet, Take 1 tablet (10 mg total) by mouth once daily., Disp: 90 tablet, Rfl: 3    oxycodone-acetaminophen (PERCOCET)  mg per tablet, Take 1 tablet by mouth every 4 (four) hours as needed for Pain., Disp: 30 tablet, Rfl: 0    pravastatin (PRAVACHOL) 80 MG tablet, Take 80 mg by mouth once daily., Disp: , Rfl: 4    RANEXA 1,000 mg Tb12, Take 1,000 mg by mouth 2 (two) times daily., Disp: , Rfl: 3    RESTASIS 0.05 % ophthalmic emulsion, Place 1 drop into both eyes 2 (two) times daily., Disp: , Rfl: 4    tamsulosin (FLOMAX) 0.4 mg Cap, Take 1 capsule (0.4 mg total) by mouth once daily., Disp: 90 capsule, Rfl: 3    tizanidine (ZANAFLEX) 4 MG tablet, ONE TABLET BY MOUTH IN THE EVENING, Disp: , Rfl: 2    traZODone (DESYREL) 150 MG tablet, Take 150 mg or 300 mg at bedtime for sleep, Disp: 45 tablet, Rfl: 3    XARELTO 15 mg Tab, ONE TABLET BY MOUTH TWICE DAILY FOR 21 DAYS, Disp: , Rfl: 0    EXAMINATION    Vitals:    10/01/19 1331   BP: 117/64   Pulse: 94     Weight 236 lb's    CONSTITUTIONAL  General Appearance: well nourished    MUSCULOSKELETAL  Muscle Strength and Tone: normal strength and tone  Abnormal Involuntary Movements: no abnormal movement noted  Gait and Station: normal gait    PSYCHIATRIC   Level of Consciousness: alert  Orientation: oriented to person, place and time  Grooming: well groomed  Psychomotor Behavior: no restlessness/agitation  Speech: normal in rate, rhythm and volume  Language: normal vocabulary  Mood: anxious at times  Affect: full range and appropriate  Thought Process: logical and goal directed  Associations: intact associations  Thought Content: no SI/HI  Memory: grossly intact  Attention: intact to content of  interview  Fund of Knowledge: appears adequate  Insight: good  Judgement: good    MEDICAL DECISION MAKING    DIAGNOSES  Anxiety d/o, unspecified    PROBLEM LIST AND MANAGEMENT PLANS    - anxiety:   Continue Celexa, Buspar, Trazodone and prn Xanax as above for now  - rtc 3 months     Time with patient: 20 min    LABORATORY DATA  No visits with results within 3 Month(s) from this visit.   Latest known visit with results is:   Office Visit on 04/02/2019   Component Date Value Ref Range Status    Color, UA 04/02/2019 yellow   Final    Spec Grav UA 04/02/2019 1,000   Final    pH, UA 04/02/2019 5   Final    WBC, UA 04/02/2019 neg   Final    Nitrite, UA 04/02/2019 neg   Final    Protein 04/02/2019 neg   Final    Glucose, UA 04/02/2019 neg   Final    Ketones, UA 04/02/2019 neg   Final    Urobilinogen, UA 04/02/2019 neg   Final    Bilirubin 04/02/2019 neg   Final    Blood, UA 04/02/2019 neg   Final           Sampson Luke

## 2019-10-09 ENCOUNTER — OFFICE VISIT (OUTPATIENT)
Dept: UROLOGY | Facility: CLINIC | Age: 59
End: 2019-10-09
Payer: MEDICARE

## 2019-10-09 VITALS
DIASTOLIC BLOOD PRESSURE: 78 MMHG | WEIGHT: 238.31 LBS | SYSTOLIC BLOOD PRESSURE: 118 MMHG | HEIGHT: 69 IN | BODY MASS INDEX: 35.3 KG/M2

## 2019-10-09 DIAGNOSIS — R35.1 NOCTURIA MORE THAN TWICE PER NIGHT: ICD-10-CM

## 2019-10-09 DIAGNOSIS — N40.0 BPH WITHOUT OBSTRUCTION/LOWER URINARY TRACT SYMPTOMS: ICD-10-CM

## 2019-10-09 DIAGNOSIS — R35.0 URINARY FREQUENCY: ICD-10-CM

## 2019-10-09 DIAGNOSIS — N50.819 ORCHALGIA: Primary | ICD-10-CM

## 2019-10-09 LAB
BILIRUB SERPL-MCNC: NORMAL MG/DL
BLOOD URINE, POC: NORMAL
COLOR, POC UA: YELLOW
GLUCOSE UR QL STRIP: 1000
KETONES UR QL STRIP: NORMAL
LEUKOCYTE ESTERASE URINE, POC: NORMAL
NITRITE, POC UA: NORMAL
PH, POC UA: 5
PROTEIN, POC: NORMAL
SPECIFIC GRAVITY, POC UA: 1000
UROBILINOGEN, POC UA: NORMAL

## 2019-10-09 PROCEDURE — 81001 URINALYSIS AUTO W/SCOPE: CPT | Mod: PBBFAC | Performed by: UROLOGY

## 2019-10-09 PROCEDURE — 99214 OFFICE O/P EST MOD 30 MIN: CPT | Mod: S$PBB,,, | Performed by: UROLOGY

## 2019-10-09 PROCEDURE — 99214 PR OFFICE/OUTPT VISIT, EST, LEVL IV, 30-39 MIN: ICD-10-PCS | Mod: S$PBB,,, | Performed by: UROLOGY

## 2019-10-09 PROCEDURE — 99213 OFFICE O/P EST LOW 20 MIN: CPT | Mod: PBBFAC | Performed by: UROLOGY

## 2019-10-09 PROCEDURE — 99999 PR PBB SHADOW E&M-EST. PATIENT-LVL III: CPT | Mod: PBBFAC,,, | Performed by: UROLOGY

## 2019-10-09 PROCEDURE — 99999 PR PBB SHADOW E&M-EST. PATIENT-LVL III: ICD-10-PCS | Mod: PBBFAC,,, | Performed by: UROLOGY

## 2019-10-09 NOTE — PROGRESS NOTES
Subjective:       Patient ID: Min Nunez is a 58 y.o. male who was last seen in this office 7/8/2019    Chief Complaint:   Chief Complaint   Patient presents with    Groin Pain     3 month f/u pt states he is doing okay while taking pain medication           Orchalgia  He had a bilateral epididymectomy on 8/5/2016.  He continues to have right sided swelling and bilateral pain.  His right sided pain is worse than left.   He complains of pain with sitting, standing, getting up to stand, after roly movements, lifting, and walking.  He has difficulty walking or performing exercises.        He had severe left sided pain recently.  It seems to be better today. He has mentioned a block with Dr. Crowley, but they are hesitant to proceed.        Urinary frequency and Urgency Incontinence  He has some frequency and urgency that is managed with Ditropan XL 10 mg and Myrbetriq 50 mg and Flomax.  [employer] is no longer covered.      ACTIVE MEDICAL ISSUES:  Patient Active Problem List   Diagnosis    Chest pain, exertional    Coronary artery disease, occlusive    Coronary atherosclerosis    Orchalgia    Dizziness    Syncope    Abnormal brain MRI    Migraines    Atypical migraine    HA (headache)    Numbness    Facial droop    Faintness    Groin pain    Constipation    Hypotension    Urinary urgency    Nocturia more than twice per night    Pneumonia of right lower lobe due to infectious organism    Hypertension    Mixed hyperlipidemia    Morbid obesity    Anxiety       ALLERGIES AND MEDICATIONS: updated and reviewed.  Review of patient's allergies indicates:  No Known Allergies  Current Outpatient Medications   Medication Sig    albuterol (PROAIR HFA) 90 mcg/actuation inhaler Inhale 2 puffs into the lungs every 6 (six) hours as needed for Wheezing.    ALPRAZolam (XANAX) 0.5 MG tablet Take 1 tablet (0.5 mg total) by mouth 2 (two) times daily as needed for Anxiety.    busPIRone (BUSPAR) 15 MG tablet Take 1  tablet (15 mg total) by mouth 3 (three) times daily.    chlorhexidine (PERIDEX) 0.12 % solution swish AND SPIT FIFTEEN MILLILITERS BY MOUTH TWICE DAILY    citalopram (CELEXA) 40 MG tablet Take 1 tablet (40 mg total) by mouth once daily.    divalproex (DEPAKOTE) 250 MG EC tablet 1 pill in morning, 1 pill in evening (Patient taking differently: Take 500 mg by mouth 2 (two) times daily. )    fenofibrate 160 MG Tab Take 160 mg by mouth once daily.    gabapentin (NEURONTIN) 300 MG capsule Take 300 mg by mouth once daily.    hydrOXYzine pamoate (VISTARIL) 25 MG Cap 1 pill 3x a day for 2 days, rest left over every 8 hours as needed for migraine (Patient taking differently: Take 25 mg by mouth once daily. 1 pill 3x a day for 2 days, rest left over every 8 hours as needed for migraine)    hydrOXYzine pamoate (VISTARIL) 25 MG Cap Take 1 capsule (25 mg total) by mouth 3 (three) times daily as needed.    irbesartan (AVAPRO) 300 MG tablet Take 300 mg by mouth once daily.    isosorbide mononitrate (IMDUR) 60 MG 24 hr tablet Take 60 mg by mouth every morning.    lidocaine HCl 3 % Crea APPLY 2 grams to the painful area THREE TIMES A DAY    meloxicam (MOBIC) 7.5 MG tablet Take 7.5 mg by mouth 2 (two) times daily.     metoprolol succinate (TOPROL-XL) 25 MG 24 hr tablet Take 25 mg by mouth 2 (two) times daily.     midodrine (PROAMATINE) 2.5 MG Tab Take 2.5 mg by mouth 2 (two) times daily with meals.     mirabegron (MYRBETRIQ) 25 mg Tb24 ER tablet Take 1 tablet (25 mg total) by mouth once daily.    mupirocin (BACTROBAN) 2 % ointment 2 (two) times daily.    nitroGLYCERIN (NITROSTAT) 0.4 MG SL tablet Place 0.4 mg under the tongue every 5 (five) minutes as needed for Chest pain.    NON FORMULARY MEDICATION Apply topically 4 (four) times daily as needed. Gabapentin 5%, Orphenadrine 2%, Lidocaine 2%, Prilocaine 2%    omega-3 acid ethyl esters (LOVAZA) 1 gram capsule Take 2 capsules by mouth 2 (two) times daily.     "oxybutynin (DITROPAN-XL) 10 MG 24 hr tablet Take 1 tablet (10 mg total) by mouth once daily.    oxycodone-acetaminophen (PERCOCET)  mg per tablet Take 1 tablet by mouth every 4 (four) hours as needed for Pain.    pravastatin (PRAVACHOL) 80 MG tablet Take 80 mg by mouth once daily.    RANEXA 1,000 mg Tb12 Take 1,000 mg by mouth 2 (two) times daily.    RESTASIS 0.05 % ophthalmic emulsion Place 1 drop into both eyes 2 (two) times daily.    tamsulosin (FLOMAX) 0.4 mg Cap Take 1 capsule (0.4 mg total) by mouth once daily.    tizanidine (ZANAFLEX) 4 MG tablet ONE TABLET BY MOUTH IN THE EVENING    traZODone (DESYREL) 150 MG tablet Take 150 mg or 300 mg at bedtime for sleep    XARELTO 15 mg Tab ONE TABLET BY MOUTH TWICE DAILY FOR 21 DAYS     No current facility-administered medications for this visit.        Review of Systems   Constitutional: Negative for activity change, fatigue, fever and unexpected weight change.   HENT: Negative for congestion.    Eyes: Negative for redness.   Respiratory: Negative for chest tightness and shortness of breath.    Cardiovascular: Negative for chest pain and leg swelling.   Gastrointestinal: Negative for abdominal pain, constipation, diarrhea, nausea and vomiting.   Genitourinary: Negative for dysuria, flank pain, frequency, hematuria, penile pain, penile swelling, scrotal swelling, testicular pain and urgency.   Musculoskeletal: Negative for arthralgias and back pain.   Neurological: Negative for dizziness and light-headedness.   Psychiatric/Behavioral: Negative for behavioral problems and confusion. The patient is not nervous/anxious.    All other systems reviewed and are negative.      Objective:      Vitals:    10/09/19 1417   BP: 118/78   BP Location: Right arm   Patient Position: Sitting   BP Method: Large (Manual)   Weight: 108.1 kg (238 lb 5.1 oz)   Height: 5' 9" (1.753 m)     Physical Exam   Nursing note and vitals reviewed.  Constitutional: He is oriented to " person, place, and time. He appears well-developed and well-nourished.   HENT:   Head: Normocephalic.   Eyes: Conjunctivae are normal.   Neck: Normal range of motion. Neck supple. No tracheal deviation present. No thyromegaly present.   Cardiovascular: Normal rate and normal heart sounds.    Pulmonary/Chest: Effort normal and breath sounds normal. No respiratory distress. He has no wheezes.   Abdominal: Soft. Bowel sounds are normal. There is no hepatosplenomegaly. There is no tenderness. There is no rebound and no CVA tenderness. No hernia.   Genitourinary: Penis normal. Circumcised.   Genitourinary Comments: Bilateral epididymal cysts, tender.  No erythema   Musculoskeletal: Normal range of motion. He exhibits no edema or tenderness.   Lymphadenopathy:     He has no cervical adenopathy.   Neurological: He is alert and oriented to person, place, and time.   Skin: Skin is warm and dry. No rash noted. No erythema.     Psychiatric: He has a normal mood and affect. His behavior is normal. Judgment and thought content normal.       Urine dipstick shows negative for all components.  Micro exam: negative for WBC's or RBC's.    Assessment:       1. Orchalgia    2. Nocturia more than twice per night    3. BPH without obstruction/lower urinary tract symptoms    4. Urinary frequency          Plan:       1. Orchalgia  Stable   Monitor    2. Nocturia more than twice per night  Limit evening fluids  - POCT urinalysis, dipstick or tablet reag    3. BPH without obstruction/lower urinary tract symptoms  stable  - Prostate Specific Antigen, Diagnostic; Future    4. Urinary frequency  improved            Follow up in about 3 months (around 1/9/2020) for Review PSA, Follow up.

## 2019-11-01 RX ORDER — ALPRAZOLAM 0.5 MG/1
0.5 TABLET ORAL DAILY PRN
Qty: 30 TABLET | Refills: 0 | Status: SHIPPED | OUTPATIENT
Start: 2019-11-01 | End: 2020-06-24 | Stop reason: SDUPTHER

## 2019-12-24 ENCOUNTER — OFFICE VISIT (OUTPATIENT)
Dept: PSYCHIATRY | Facility: CLINIC | Age: 59
End: 2019-12-24
Payer: MEDICARE

## 2019-12-24 DIAGNOSIS — F41.9 ANXIETY: Primary | ICD-10-CM

## 2019-12-24 PROCEDURE — 99213 OFFICE O/P EST LOW 20 MIN: CPT | Mod: S$PBB,,, | Performed by: PSYCHIATRY & NEUROLOGY

## 2019-12-24 PROCEDURE — 99213 PR OFFICE/OUTPT VISIT, EST, LEVL III, 20-29 MIN: ICD-10-PCS | Mod: S$PBB,,, | Performed by: PSYCHIATRY & NEUROLOGY

## 2019-12-24 RX ORDER — TRAZODONE HYDROCHLORIDE 150 MG/1
TABLET ORAL
Qty: 45 TABLET | Refills: 3 | Status: SHIPPED | OUTPATIENT
Start: 2019-12-24 | End: 2020-02-18 | Stop reason: SDUPTHER

## 2019-12-24 RX ORDER — CITALOPRAM 40 MG/1
40 TABLET, FILM COATED ORAL DAILY
Qty: 30 TABLET | Refills: 3 | Status: SHIPPED | OUTPATIENT
Start: 2019-12-24 | End: 2020-02-18 | Stop reason: SDUPTHER

## 2019-12-24 RX ORDER — BUSPIRONE HYDROCHLORIDE 15 MG/1
15 TABLET ORAL 3 TIMES DAILY
Qty: 90 TABLET | Refills: 3 | Status: SHIPPED | OUTPATIENT
Start: 2019-12-24 | End: 2020-02-18 | Stop reason: SDUPTHER

## 2019-12-24 RX ORDER — CLONAZEPAM 0.5 MG/1
0.5 TABLET ORAL DAILY PRN
Qty: 30 TABLET | Refills: 3 | Status: SHIPPED | OUTPATIENT
Start: 2019-12-24 | End: 2020-02-18 | Stop reason: SDUPTHER

## 2019-12-24 NOTE — PROGRESS NOTES
ESTABLISHED OUTPATIENT VISIT   E/M LEVEL 3: 39721    ENCOUNTER DATE: 12/24/2019  SITE: [facility]    HISTORY    CHIEF COMPLAINT   Min Nunez is a 59 y.o. male who presents for follow up of anxiety/depression.      HPI    Physical issues remain troublesome, affect mood.     Overall appears stable psychiatrically.    Satisfied with current psychotropic medication regimen.    Psychiatric Review Of Systems - Is patient experiencing or having changes in:  sleep: varies  appetite: no  weight: no  energy/anergy: no  interest/pleasure/anhedonia: no  somatic symptoms: no  libido: no  anxiety/panic: anxiety at times  guilty/hopelessness: no  concentration: no  S.I.B.s/risky behavior: no  Irritability: no  Racing thoughts: no  Impulsive behaviors: no  Paranoia:no  AVH:no    Recent alcohol: no  Recent drug: no    Medical ROS   Multiple physical problems     PAST MEDICAL, FAMILY AND SOCIAL HISTORY: The patient's past medical, family and social history have been reviewed and updated as appropriate within the electronic medical record - see encounter notes.    PSYCHOTROPIC MEDICATIONS   Trazodone 150-300 mg at bedtime, Buspar 15 mg tid, Celexa 40 mg qam, Depakote 500 mg qam, Depakote 500 mg at bedtime[for headaches], Neurontin 300 mg tid, Hydroxyzine prn for migraines, Klonopin 0.5 mg prn  Scheduled and PRN Medications     Current Outpatient Medications:     albuterol (PROAIR HFA) 90 mcg/actuation inhaler, Inhale 2 puffs into the lungs every 6 (six) hours as needed for Wheezing., Disp: 18 g, Rfl: 0    ALPRAZolam (XANAX) 0.5 MG tablet, Take 1 tablet (0.5 mg total) by mouth daily as needed for Anxiety., Disp: 30 tablet, Rfl: 0    busPIRone (BUSPAR) 15 MG tablet, Take 1 tablet (15 mg total) by mouth 3 (three) times daily., Disp: 90 tablet, Rfl: 3    chlorhexidine (PERIDEX) 0.12 % solution, swish AND SPIT FIFTEEN MILLILITERS BY MOUTH TWICE DAILY, Disp: , Rfl: 0    citalopram (CELEXA) 40 MG tablet,  Take 1 tablet (40 mg total) by mouth once daily., Disp: 30 tablet, Rfl: 3    divalproex (DEPAKOTE) 250 MG EC tablet, 1 pill in morning, 1 pill in evening (Patient taking differently: Take 500 mg by mouth 2 (two) times daily. ), Disp: 60 tablet, Rfl: 6    fenofibrate 160 MG Tab, Take 160 mg by mouth once daily., Disp: , Rfl:     gabapentin (NEURONTIN) 300 MG capsule, Take 300 mg by mouth once daily., Disp: , Rfl:     hydrOXYzine pamoate (VISTARIL) 25 MG Cap, 1 pill 3x a day for 2 days, rest left over every 8 hours as needed for migraine (Patient taking differently: Take 25 mg by mouth once daily. 1 pill 3x a day for 2 days, rest left over every 8 hours as needed for migraine), Disp: 15 capsule, Rfl: 3    hydrOXYzine pamoate (VISTARIL) 25 MG Cap, Take 1 capsule (25 mg total) by mouth 3 (three) times daily as needed., Disp: 30 capsule, Rfl: 3    irbesartan (AVAPRO) 300 MG tablet, Take 300 mg by mouth once daily., Disp: , Rfl:     isosorbide mononitrate (IMDUR) 60 MG 24 hr tablet, Take 60 mg by mouth every morning., Disp: , Rfl: 3    lidocaine HCl 3 % Crea, APPLY 2 grams to the painful area THREE TIMES A DAY, Disp: , Rfl: 3    meloxicam (MOBIC) 7.5 MG tablet, Take 7.5 mg by mouth 2 (two) times daily. , Disp: , Rfl:     metoprolol succinate (TOPROL-XL) 25 MG 24 hr tablet, Take 25 mg by mouth 2 (two) times daily. , Disp: , Rfl:     midodrine (PROAMATINE) 2.5 MG Tab, Take 2.5 mg by mouth 2 (two) times daily with meals. , Disp: , Rfl:     mirabegron (MYRBETRIQ) 25 mg Tb24 ER tablet, Take 1 tablet (25 mg total) by mouth once daily., Disp: 90 tablet, Rfl: 3    mupirocin (BACTROBAN) 2 % ointment, 2 (two) times daily., Disp: , Rfl: 0    nitroGLYCERIN (NITROSTAT) 0.4 MG SL tablet, Place 0.4 mg under the tongue every 5 (five) minutes as needed for Chest pain., Disp: , Rfl:     NON FORMULARY MEDICATION, Apply topically 4 (four) times daily as needed. Gabapentin 5%, Orphenadrine 2%, Lidocaine 2%, Prilocaine 2%, Disp:  , Rfl:     omega-3 acid ethyl esters (LOVAZA) 1 gram capsule, Take 2 capsules by mouth 2 (two) times daily., Disp: , Rfl: 3    oxybutynin (DITROPAN-XL) 10 MG 24 hr tablet, Take 1 tablet (10 mg total) by mouth once daily., Disp: 90 tablet, Rfl: 3    oxycodone-acetaminophen (PERCOCET)  mg per tablet, Take 1 tablet by mouth every 4 (four) hours as needed for Pain., Disp: 30 tablet, Rfl: 0    pravastatin (PRAVACHOL) 80 MG tablet, Take 80 mg by mouth once daily., Disp: , Rfl: 4    RANEXA 1,000 mg Tb12, Take 1,000 mg by mouth 2 (two) times daily., Disp: , Rfl: 3    RESTASIS 0.05 % ophthalmic emulsion, Place 1 drop into both eyes 2 (two) times daily., Disp: , Rfl: 4    tamsulosin (FLOMAX) 0.4 mg Cap, Take 1 capsule (0.4 mg total) by mouth once daily., Disp: 90 capsule, Rfl: 3    tizanidine (ZANAFLEX) 4 MG tablet, ONE TABLET BY MOUTH IN THE EVENING, Disp: , Rfl: 2    traZODone (DESYREL) 150 MG tablet, Take 150 mg or 300 mg at bedtime for sleep, Disp: 45 tablet, Rfl: 3    XARELTO 15 mg Tab, ONE TABLET BY MOUTH TWICE DAILY FOR 21 DAYS, Disp: , Rfl: 0    EXAMINATION    There were no vitals filed for this visit.    CONSTITUTIONAL  General Appearance: well nourished    MUSCULOSKELETAL  Muscle Strength and Tone: normal strength and tone  Abnormal Involuntary Movements: no abnormal movement noted  Gait and Station: normal gait    PSYCHIATRIC   Level of Consciousness: alert  Orientation: oriented to person, place and time  Grooming: well groomed  Psychomotor Behavior: no restlessness/agitation  Speech: normal in rate, rhythm and volume  Language: normal vocabulary  Mood: anxious at times  Affect: full range and appropriate  Thought Process: logical and goal directed  Associations: intact associations  Thought Content: no SI/HI  Memory: grossly intact  Attention: intact to content of interview  Fund of Knowledge: appears adequate  Insight: good  Judgement: good    MEDICAL DECISION MAKING    DIAGNOSES  Anxiety d/o,  unspecified    PROBLEM LIST AND MANAGEMENT PLANS    - anxiety:   Continue Celexa, Buspar, Trazodone and prn Xanax as above for now  - rtc 3 months     Time with patient: 20 min    LABORATORY DATA  Office Visit on 10/09/2019   Component Date Value Ref Range Status    Color, UA 10/09/2019 Yellow   Final    Spec Grav UA 10/09/2019 1,000   Final    pH, UA 10/09/2019 5   Final    WBC, UA 10/09/2019 neg   Final    Nitrite, UA 10/09/2019 neg   Final    Protein 10/09/2019 neg   Final    Glucose, UA 10/09/2019 1,000   Final    Ketones, UA 10/09/2019 neg   Final    Urobilinogen, UA 10/09/2019 neg   Final    Bilirubin 10/09/2019 neg   Final    Blood, UA 10/09/2019 neg   Final           Sampson Luke

## 2020-01-02 ENCOUNTER — LAB VISIT (OUTPATIENT)
Dept: LAB | Facility: HOSPITAL | Age: 60
End: 2020-01-02
Attending: UROLOGY
Payer: MEDICARE

## 2020-01-02 DIAGNOSIS — N40.0 BPH WITHOUT OBSTRUCTION/LOWER URINARY TRACT SYMPTOMS: ICD-10-CM

## 2020-01-02 PROCEDURE — 84153 ASSAY OF PSA TOTAL: CPT

## 2020-01-02 PROCEDURE — 36415 COLL VENOUS BLD VENIPUNCTURE: CPT | Mod: PO

## 2020-01-03 LAB — COMPLEXED PSA SERPL-MCNC: 0.43 NG/ML (ref 0–4)

## 2020-02-08 NOTE — TELEPHONE ENCOUNTER
----- Message from Suzanne Rose sent at 7/30/2019 11:52 AM CDT -----  Contact: Self   Type: RX Refill Request    Who Called:  Self     Refill or New Rx: refill     RX Name and Strength:  tamsulosin (FLOMAX) 0.4 mg Cap     How is the patient currently taking it? (ex. 1XDay):     Is this a 30 day or 90 day RX: 90    Preferred Pharmacy with phone number: [facility] [address] [phone] (Phone)  [phone] (Fax)        Local or Mail Order: local     Ordering Provider: Emeka     Would the patient rather a call back or a response via [facility]?  Call   Best Call Back Number:[phone]           0 = independent

## 2020-02-11 ENCOUNTER — OFFICE VISIT (OUTPATIENT)
Dept: UROLOGY | Facility: CLINIC | Age: 60
End: 2020-02-11
Payer: MEDICARE

## 2020-02-11 VITALS — BODY MASS INDEX: 33.67 KG/M2 | HEIGHT: 69 IN | WEIGHT: 227.31 LBS

## 2020-02-11 DIAGNOSIS — R35.1 NOCTURIA MORE THAN TWICE PER NIGHT: ICD-10-CM

## 2020-02-11 DIAGNOSIS — R39.15 URINARY URGENCY: ICD-10-CM

## 2020-02-11 DIAGNOSIS — N50.819 ORCHALGIA: Primary | ICD-10-CM

## 2020-02-11 DIAGNOSIS — N40.0 BPH WITHOUT OBSTRUCTION/LOWER URINARY TRACT SYMPTOMS: ICD-10-CM

## 2020-02-11 DIAGNOSIS — R35.0 URINARY FREQUENCY: ICD-10-CM

## 2020-02-11 PROCEDURE — 99999 PR PBB SHADOW E&M-EST. PATIENT-LVL III: CPT | Mod: PBBFAC,,, | Performed by: UROLOGY

## 2020-02-11 PROCEDURE — 81001 URINALYSIS AUTO W/SCOPE: CPT | Mod: PBBFAC | Performed by: UROLOGY

## 2020-02-11 PROCEDURE — 99214 PR OFFICE/OUTPT VISIT, EST, LEVL IV, 30-39 MIN: ICD-10-PCS | Mod: S$PBB,,, | Performed by: UROLOGY

## 2020-02-11 PROCEDURE — 99214 OFFICE O/P EST MOD 30 MIN: CPT | Mod: S$PBB,,, | Performed by: UROLOGY

## 2020-02-11 PROCEDURE — 99999 PR PBB SHADOW E&M-EST. PATIENT-LVL III: ICD-10-PCS | Mod: PBBFAC,,, | Performed by: UROLOGY

## 2020-02-11 PROCEDURE — 99213 OFFICE O/P EST LOW 20 MIN: CPT | Mod: PBBFAC | Performed by: UROLOGY

## 2020-02-11 RX ORDER — OXYBUTYNIN CHLORIDE 10 MG/1
10 TABLET, EXTENDED RELEASE ORAL DAILY
Qty: 90 TABLET | Refills: 3 | Status: SHIPPED | OUTPATIENT
Start: 2020-02-11 | End: 2020-06-30 | Stop reason: SDUPTHER

## 2020-02-11 RX ORDER — TAMSULOSIN HYDROCHLORIDE 0.4 MG/1
0.4 CAPSULE ORAL DAILY
Qty: 90 CAPSULE | Refills: 3 | Status: SHIPPED | OUTPATIENT
Start: 2020-02-11 | End: 2020-08-20 | Stop reason: SDUPTHER

## 2020-02-11 NOTE — PROGRESS NOTES
Subjective:       Patient ID: Min Nunez is a 59 y.o. male who was last seen in this office 10/9/2019    Chief Complaint:   Chief Complaint   Patient presents with    Follow-up     3 month f/u with psa pt states pain is about the same          Orchalgia  He had a bilateral epididymectomy on 8/5/2016.  He continues to have right sided swelling and bilateral pain.  His right sided pain is worse than left.   He complains of pain with sitting, standing, getting up to stand, after roly movements, lifting, and walking.  He has difficulty walking or performing exercises.        He had severe left sided pain recently.  It seems to be better today. He has mentioned a block with Dr. Crowley, but they are hesitant to proceed.        Urinary frequency and Urgency Incontinence  He has some frequency and urgency that is managed with Ditropan XL 10 mg and Flomax.  [employer] is no longer covered.  He has Myrbetriq but is not taking it yet.  He recently had a pacemaker placed after a syncopal episode.    ACTIVE MEDICAL ISSUES:  Patient Active Problem List   Diagnosis    Chest pain, exertional    Coronary artery disease, occlusive    Coronary atherosclerosis    Orchalgia    Dizziness    Syncope    Abnormal brain MRI    Migraines    Atypical migraine    HA (headache)    Numbness    Facial droop    Faintness    Groin pain    Constipation    Hypotension    Urinary urgency    Nocturia more than twice per night    Pneumonia of right lower lobe due to infectious organism    Hypertension    Mixed hyperlipidemia    Morbid obesity    Anxiety       ALLERGIES AND MEDICATIONS: updated and reviewed.  Review of patient's allergies indicates:  No Known Allergies  Current Outpatient Medications   Medication Sig    albuterol (PROAIR HFA) 90 mcg/actuation inhaler Inhale 2 puffs into the lungs every 6 (six) hours as needed for Wheezing.    busPIRone (BUSPAR) 15 MG tablet Take 1 tablet (15 mg total) by mouth 3 (three) times  daily.    chlorhexidine (PERIDEX) 0.12 % solution swish AND SPIT FIFTEEN MILLILITERS BY MOUTH TWICE DAILY    citalopram (CELEXA) 40 MG tablet Take 1 tablet (40 mg total) by mouth once daily.    clonazePAM (KLONOPIN) 0.5 MG tablet Take 1 tablet (0.5 mg total) by mouth daily as needed for Anxiety.    divalproex (DEPAKOTE) 250 MG EC tablet 1 pill in morning, 1 pill in evening (Patient taking differently: Take 500 mg by mouth 2 (two) times daily. )    fenofibrate 160 MG Tab Take 160 mg by mouth once daily.    gabapentin (NEURONTIN) 300 MG capsule Take 300 mg by mouth once daily.    hydrOXYzine pamoate (VISTARIL) 25 MG Cap 1 pill 3x a day for 2 days, rest left over every 8 hours as needed for migraine (Patient taking differently: Take 25 mg by mouth once daily. 1 pill 3x a day for 2 days, rest left over every 8 hours as needed for migraine)    hydrOXYzine pamoate (VISTARIL) 25 MG Cap Take 1 capsule (25 mg total) by mouth 3 (three) times daily as needed.    irbesartan (AVAPRO) 300 MG tablet Take 300 mg by mouth once daily.    isosorbide mononitrate (IMDUR) 60 MG 24 hr tablet Take 60 mg by mouth every morning.    lidocaine HCl 3 % Crea APPLY 2 grams to the painful area THREE TIMES A DAY    meloxicam (MOBIC) 7.5 MG tablet Take 7.5 mg by mouth 2 (two) times daily.     metoprolol succinate (TOPROL-XL) 25 MG 24 hr tablet Take 25 mg by mouth 2 (two) times daily.     midodrine (PROAMATINE) 2.5 MG Tab Take 2.5 mg by mouth 2 (two) times daily with meals.     mirabegron (MYRBETRIQ) 25 mg Tb24 ER tablet Take 1 tablet (25 mg total) by mouth once daily.    mupirocin (BACTROBAN) 2 % ointment 2 (two) times daily.    nitroGLYCERIN (NITROSTAT) 0.4 MG SL tablet Place 0.4 mg under the tongue every 5 (five) minutes as needed for Chest pain.    NON FORMULARY MEDICATION Apply topically 4 (four) times daily as needed. Gabapentin 5%, Orphenadrine 2%, Lidocaine 2%, Prilocaine 2%    omega-3 acid ethyl esters (LOVAZA) 1 gram  "capsule Take 2 capsules by mouth 2 (two) times daily.    oxybutynin (DITROPAN-XL) 10 MG 24 hr tablet Take 1 tablet (10 mg total) by mouth once daily.    oxycodone-acetaminophen (PERCOCET)  mg per tablet Take 1 tablet by mouth every 4 (four) hours as needed for Pain.    pravastatin (PRAVACHOL) 80 MG tablet Take 80 mg by mouth once daily.    RANEXA 1,000 mg Tb12 Take 1,000 mg by mouth 2 (two) times daily.    RESTASIS 0.05 % ophthalmic emulsion Place 1 drop into both eyes 2 (two) times daily.    tamsulosin (FLOMAX) 0.4 mg Cap Take 1 capsule (0.4 mg total) by mouth once daily.    tizanidine (ZANAFLEX) 4 MG tablet ONE TABLET BY MOUTH IN THE EVENING    traZODone (DESYREL) 150 MG tablet Take 150 mg or 300 mg at bedtime for sleep    XARELTO 15 mg Tab ONE TABLET BY MOUTH TWICE DAILY FOR 21 DAYS    ALPRAZolam (XANAX) 0.5 MG tablet Take 1 tablet (0.5 mg total) by mouth daily as needed for Anxiety.     No current facility-administered medications for this visit.        Review of Systems   Constitutional: Negative for activity change, fatigue, fever and unexpected weight change.   HENT: Negative for congestion.    Eyes: Negative for redness.   Respiratory: Negative for chest tightness and shortness of breath.    Cardiovascular: Negative for chest pain and leg swelling.   Gastrointestinal: Negative for abdominal pain, constipation, diarrhea, nausea and vomiting.   Genitourinary: Negative for dysuria, flank pain, frequency, hematuria, penile pain, penile swelling, scrotal swelling, testicular pain and urgency.   Musculoskeletal: Negative for arthralgias and back pain.   Neurological: Negative for dizziness and light-headedness.   Psychiatric/Behavioral: Negative for behavioral problems and confusion. The patient is not nervous/anxious.    All other systems reviewed and are negative.      Objective:      Vitals:    02/11/20 1557   Weight: 103.1 kg (227 lb 4.7 oz)   Height: 5' 9" (1.753 m)     Physical Exam   Nursing " note and vitals reviewed.  Constitutional: He is oriented to person, place, and time. He appears well-developed and well-nourished.   HENT:   Head: Normocephalic.   Eyes: Conjunctivae are normal.   Neck: Normal range of motion. Neck supple. No tracheal deviation present. No thyromegaly present.   Cardiovascular: Normal rate and normal heart sounds.    Pulmonary/Chest: Effort normal and breath sounds normal. No respiratory distress. He has no wheezes.   Abdominal: Soft. Bowel sounds are normal. There is no hepatosplenomegaly. There is no tenderness. There is no rebound and no CVA tenderness. No hernia.   Genitourinary: Testes normal and penis normal. Right testis shows no mass and no tenderness. Left testis shows no mass and no tenderness. Circumcised.   Musculoskeletal: Normal range of motion. He exhibits no edema or tenderness.   Lymphadenopathy:     He has no cervical adenopathy.   Neurological: He is alert and oriented to person, place, and time.   Skin: Skin is warm and dry. No rash noted. No erythema.     Psychiatric: He has a normal mood and affect. His behavior is normal. Judgment and thought content normal.       Urine dipstick shows negative for all components.  Micro exam: negative for WBC's or RBC's.     Ref Range & Units 1mo ago   PSA DIAGNOSTIC 0.00 - 4.00 ng/mL 0.43    Comment: PSA Expected levels:   Hormonal Therapy: <0.05 ng/ml   Prostatectomy: <0.01 ng/ml   Radiation Therapy: <1.00 ng/ml    Resulting Agency  [facility]      Narrative   Performed by: [facility]   PSA 3 months      Specimen Collected: 01/02/20 11:30 Last Resulted: 01/03/20 03:28            Assessment:       1. Orchalgia    2. Nocturia more than twice per night    3. BPH without obstruction/lower urinary tract symptoms    4. Urinary frequency    5. Urinary urgency          Plan:       1. Orchalgia    - POCT urinalysis, dipstick or tablet reag  - US Scrotum And Testicles; Future    2. Nocturia more than twice per night  Limit evening fluids    3.  BPH without obstruction/lower urinary tract symptoms    - tamsulosin (FLOMAX) 0.4 mg Cap; Take 1 capsule (0.4 mg total) by mouth once daily.  Dispense: 90 capsule; Refill: 3    4. Urinary frequency  Glucose control     5. Urinary urgency    - oxybutynin (DITROPAN-XL) 10 MG 24 hr tablet; Take 1 tablet (10 mg total) by mouth once daily.  Dispense: 90 tablet; Refill: 3            Follow up in about 3 months (around 5/11/2020) for Follow up.

## 2020-02-18 ENCOUNTER — OFFICE VISIT (OUTPATIENT)
Dept: PSYCHIATRY | Facility: CLINIC | Age: 60
End: 2020-02-18
Payer: MEDICARE

## 2020-02-18 VITALS
BODY MASS INDEX: 34.2 KG/M2 | WEIGHT: 230.94 LBS | HEART RATE: 99 BPM | DIASTOLIC BLOOD PRESSURE: 90 MMHG | SYSTOLIC BLOOD PRESSURE: 142 MMHG | HEIGHT: 69 IN

## 2020-02-18 DIAGNOSIS — F41.9 ANXIETY: Primary | ICD-10-CM

## 2020-02-18 PROCEDURE — 99213 PR OFFICE/OUTPT VISIT, EST, LEVL III, 20-29 MIN: ICD-10-PCS | Mod: S$PBB,,, | Performed by: PSYCHIATRY & NEUROLOGY

## 2020-02-18 PROCEDURE — 99212 OFFICE O/P EST SF 10 MIN: CPT | Mod: PBBFAC | Performed by: PSYCHIATRY & NEUROLOGY

## 2020-02-18 PROCEDURE — 99213 OFFICE O/P EST LOW 20 MIN: CPT | Mod: S$PBB,,, | Performed by: PSYCHIATRY & NEUROLOGY

## 2020-02-18 PROCEDURE — 99999 PR PBB SHADOW E&M-EST. PATIENT-LVL II: CPT | Mod: PBBFAC,,, | Performed by: PSYCHIATRY & NEUROLOGY

## 2020-02-18 PROCEDURE — 99999 PR PBB SHADOW E&M-EST. PATIENT-LVL II: ICD-10-PCS | Mod: PBBFAC,,, | Performed by: PSYCHIATRY & NEUROLOGY

## 2020-02-18 RX ORDER — CLONAZEPAM 0.5 MG/1
0.5 TABLET ORAL DAILY PRN
Qty: 30 TABLET | Refills: 3 | Status: SHIPPED | OUTPATIENT
Start: 2020-02-18 | End: 2020-06-24 | Stop reason: ALTCHOICE

## 2020-02-18 RX ORDER — CITALOPRAM 40 MG/1
40 TABLET, FILM COATED ORAL DAILY
Qty: 30 TABLET | Refills: 3 | Status: SHIPPED | OUTPATIENT
Start: 2020-02-18 | End: 2020-06-24 | Stop reason: SDUPTHER

## 2020-02-18 RX ORDER — BUSPIRONE HYDROCHLORIDE 15 MG/1
15 TABLET ORAL 3 TIMES DAILY
Qty: 90 TABLET | Refills: 3 | Status: SHIPPED | OUTPATIENT
Start: 2020-02-18 | End: 2020-06-24 | Stop reason: SDUPTHER

## 2020-02-18 RX ORDER — TRAZODONE HYDROCHLORIDE 150 MG/1
TABLET ORAL
Qty: 45 TABLET | Refills: 3 | Status: SHIPPED | OUTPATIENT
Start: 2020-02-18 | End: 2020-06-01

## 2020-02-18 NOTE — PROGRESS NOTES
ESTABLISHED OUTPATIENT VISIT   E/M LEVEL 3: 00024    ENCOUNTER DATE: 2/18/2020  SITE: [facility], [address]    HISTORY    CHIEF COMPLAINT   Min Nunez is a 59 y.o. male who presents for follow up of anxiety/depression.      HPI    Cardiac pacemaker was inserted in January. Continues to struggle with physical issues.    Overall appears stable psychiatrically.    Satisfied with current psychotropic medication regimen.    Psychiatric Review Of Systems - Is patient experiencing or having changes in:  sleep: varies  appetite: no  weight: no  energy/anergy: no  interest/pleasure/anhedonia: no  somatic symptoms: no  libido: no  anxiety/panic: anxiety at times  guilty/hopelessness: no  concentration: no  S.I.B.s/risky behavior: no  Irritability: no  Racing thoughts: no  Impulsive behaviors: no  Paranoia:no  AVH:no    Recent alcohol: no  Recent drug: no    Medical ROS   Multiple physical problems     PAST MEDICAL, FAMILY AND SOCIAL HISTORY: The patient's past medical, family and social history have been reviewed and updated as appropriate within the electronic medical record - see encounter notes.    PSYCHOTROPIC MEDICATIONS   Trazodone 150-300 mg at bedtime[at times does not need], Buspar 15 mg tid, Celexa 40 mg qam, Depakote 500 mg tid[for headaches], Neurontin 300 mg tid, Hydroxyzine prn for migraines, Klonopin 0.5 mg prn[takes on average about 3 days per week]  Scheduled and PRN Medications     Current Outpatient Medications:     albuterol (PROAIR HFA) 90 mcg/actuation inhaler, Inhale 2 puffs into the lungs every 6 (six) hours as needed for Wheezing., Disp: 18 g, Rfl: 0    ALPRAZolam (XANAX) 0.5 MG tablet, Take 1 tablet (0.5 mg total) by mouth daily as needed for Anxiety., Disp: 30 tablet, Rfl: 0    busPIRone (BUSPAR) 15 MG tablet, Take 1 tablet (15 mg total) by mouth 3 (three) times daily., Disp: 90 tablet, Rfl: 3    chlorhexidine (PERIDEX) 0.12 % solution, swish AND SPIT FIFTEEN MILLILITERS BY  MOUTH TWICE DAILY, Disp: , Rfl: 0    citalopram (CELEXA) 40 MG tablet, Take 1 tablet (40 mg total) by mouth once daily., Disp: 30 tablet, Rfl: 3    clonazePAM (KLONOPIN) 0.5 MG tablet, Take 1 tablet (0.5 mg total) by mouth daily as needed for Anxiety., Disp: 30 tablet, Rfl: 3    divalproex (DEPAKOTE) 250 MG EC tablet, 1 pill in morning, 1 pill in evening (Patient taking differently: Take 500 mg by mouth 2 (two) times daily. ), Disp: 60 tablet, Rfl: 6    fenofibrate 160 MG Tab, Take 160 mg by mouth once daily., Disp: , Rfl:     gabapentin (NEURONTIN) 300 MG capsule, Take 300 mg by mouth once daily., Disp: , Rfl:     hydrOXYzine pamoate (VISTARIL) 25 MG Cap, 1 pill 3x a day for 2 days, rest left over every 8 hours as needed for migraine (Patient taking differently: Take 25 mg by mouth once daily. 1 pill 3x a day for 2 days, rest left over every 8 hours as needed for migraine), Disp: 15 capsule, Rfl: 3    hydrOXYzine pamoate (VISTARIL) 25 MG Cap, Take 1 capsule (25 mg total) by mouth 3 (three) times daily as needed., Disp: 30 capsule, Rfl: 3    irbesartan (AVAPRO) 300 MG tablet, Take 300 mg by mouth once daily., Disp: , Rfl:     isosorbide mononitrate (IMDUR) 60 MG 24 hr tablet, Take 60 mg by mouth every morning., Disp: , Rfl: 3    lidocaine HCl 3 % Crea, APPLY 2 grams to the painful area THREE TIMES A DAY, Disp: , Rfl: 3    meloxicam (MOBIC) 7.5 MG tablet, Take 7.5 mg by mouth 2 (two) times daily. , Disp: , Rfl:     metoprolol succinate (TOPROL-XL) 25 MG 24 hr tablet, Take 25 mg by mouth 2 (two) times daily. , Disp: , Rfl:     midodrine (PROAMATINE) 2.5 MG Tab, Take 2.5 mg by mouth 2 (two) times daily with meals. , Disp: , Rfl:     mirabegron (MYRBETRIQ) 25 mg Tb24 ER tablet, Take 1 tablet (25 mg total) by mouth once daily., Disp: 90 tablet, Rfl: 3    mupirocin (BACTROBAN) 2 % ointment, 2 (two) times daily., Disp: , Rfl: 0    nitroGLYCERIN (NITROSTAT) 0.4 MG SL tablet, Place 0.4 mg under the tongue  every 5 (five) minutes as needed for Chest pain., Disp: , Rfl:     NON FORMULARY MEDICATION, Apply topically 4 (four) times daily as needed. Gabapentin 5%, Orphenadrine 2%, Lidocaine 2%, Prilocaine 2%, Disp: , Rfl:     omega-3 acid ethyl esters (LOVAZA) 1 gram capsule, Take 2 capsules by mouth 2 (two) times daily., Disp: , Rfl: 3    oxybutynin (DITROPAN-XL) 10 MG 24 hr tablet, Take 1 tablet (10 mg total) by mouth once daily., Disp: 90 tablet, Rfl: 3    oxycodone-acetaminophen (PERCOCET)  mg per tablet, Take 1 tablet by mouth every 4 (four) hours as needed for Pain., Disp: 30 tablet, Rfl: 0    pravastatin (PRAVACHOL) 80 MG tablet, Take 80 mg by mouth once daily., Disp: , Rfl: 4    RANEXA 1,000 mg Tb12, Take 1,000 mg by mouth 2 (two) times daily., Disp: , Rfl: 3    RESTASIS 0.05 % ophthalmic emulsion, Place 1 drop into both eyes 2 (two) times daily., Disp: , Rfl: 4    tamsulosin (FLOMAX) 0.4 mg Cap, Take 1 capsule (0.4 mg total) by mouth once daily., Disp: 90 capsule, Rfl: 3    tizanidine (ZANAFLEX) 4 MG tablet, ONE TABLET BY MOUTH IN THE EVENING, Disp: , Rfl: 2    traZODone (DESYREL) 150 MG tablet, Take 150 mg or 300 mg at bedtime for sleep, Disp: 45 tablet, Rfl: 3    XARELTO 15 mg Tab, ONE TABLET BY MOUTH TWICE DAILY FOR 21 DAYS, Disp: , Rfl: 0    EXAMINATION    Vitals:    02/18/20 1330   BP: (!) 142/90   Pulse: 99   Weight 230 lb's    CONSTITUTIONAL  General Appearance: well nourished    MUSCULOSKELETAL  Muscle Strength and Tone: normal strength and tone  Abnormal Involuntary Movements: no abnormal movement noted  Gait and Station: normal gait    PSYCHIATRIC   Level of Consciousness: alert  Orientation: oriented to person, place and time  Grooming: well groomed  Psychomotor Behavior: no restlessness/agitation  Speech: normal in rate, rhythm and volume  Language: normal vocabulary  Mood: anxious at times  Affect: full range and appropriate  Thought Process: logical and goal directed  Associations:  intact associations  Thought Content: no SI/HI  Memory: grossly intact  Attention: intact to content of interview  Fund of Knowledge: appears adequate  Insight: good  Judgement: good    MEDICAL DECISION MAKING    DIAGNOSES  Anxiety d/o, unspecified    PROBLEM LIST AND MANAGEMENT PLANS    - anxiety:   Continue Celexa, Buspar, Trazodone and prn Klonopin as above for now  - rtc 3 months     Time with patient: 20 min    LABORATORY DATA  Office Visit on 02/11/2020   Component Date Value Ref Range Status    Color, UA 02/11/2020 yellow   Final    Spec Grav UA 02/11/2020 1,000   Final    pH, UA 02/11/2020 6   Final    WBC, UA 02/11/2020 neg   Final    Nitrite, UA 02/11/2020 neg   Final    Protein 02/11/2020 neg   Final    Glucose, UA 02/11/2020 neg   Final    Ketones, UA 02/11/2020 neg   Final    Urobilinogen, UA 02/11/2020 neg   Final    Bilirubin 02/11/2020 neg   Final    Blood, UA 02/11/2020 neg   Final   Lab Visit on 01/02/2020   Component Date Value Ref Range Status    PSA DIAGNOSTIC 01/02/2020 0.43  0.00 - 4.00 ng/mL Final    Comment: PSA Expected levels:  Hormonal Therapy: <0.05 ng/ml  Prostatectomy: <0.01 ng/ml  Radiation Therapy: <1.00 ng/ml             Sampson Luke

## 2020-06-24 ENCOUNTER — OFFICE VISIT (OUTPATIENT)
Dept: PSYCHIATRY | Facility: CLINIC | Age: 60
End: 2020-06-24
Payer: MEDICARE

## 2020-06-24 VITALS
HEART RATE: 98 BPM | SYSTOLIC BLOOD PRESSURE: 136 MMHG | DIASTOLIC BLOOD PRESSURE: 80 MMHG | BODY MASS INDEX: 35.1 KG/M2 | WEIGHT: 237.63 LBS

## 2020-06-24 DIAGNOSIS — F41.9 ANXIETY: Primary | ICD-10-CM

## 2020-06-24 PROCEDURE — 99213 OFFICE O/P EST LOW 20 MIN: CPT | Mod: S$PBB,,, | Performed by: PSYCHIATRY & NEUROLOGY

## 2020-06-24 PROCEDURE — 99213 PR OFFICE/OUTPT VISIT, EST, LEVL III, 20-29 MIN: ICD-10-PCS | Mod: S$PBB,,, | Performed by: PSYCHIATRY & NEUROLOGY

## 2020-06-24 PROCEDURE — 99999 PR PBB SHADOW E&M-EST. PATIENT-LVL II: ICD-10-PCS | Mod: PBBFAC,,, | Performed by: PSYCHIATRY & NEUROLOGY

## 2020-06-24 PROCEDURE — 99212 OFFICE O/P EST SF 10 MIN: CPT | Mod: PBBFAC | Performed by: PSYCHIATRY & NEUROLOGY

## 2020-06-24 PROCEDURE — 99999 PR PBB SHADOW E&M-EST. PATIENT-LVL II: CPT | Mod: PBBFAC,,, | Performed by: PSYCHIATRY & NEUROLOGY

## 2020-06-24 RX ORDER — CITALOPRAM 40 MG/1
40 TABLET, FILM COATED ORAL DAILY
Qty: 30 TABLET | Refills: 3 | Status: SHIPPED | OUTPATIENT
Start: 2020-06-24 | End: 2020-09-23 | Stop reason: SDUPTHER

## 2020-06-24 RX ORDER — ALPRAZOLAM 0.5 MG/1
0.5 TABLET ORAL DAILY PRN
Qty: 30 TABLET | Refills: 3 | Status: SHIPPED | OUTPATIENT
Start: 2020-06-24 | End: 2022-10-28

## 2020-06-24 RX ORDER — TRAZODONE HYDROCHLORIDE 150 MG/1
TABLET ORAL
Qty: 45 TABLET | Refills: 3 | Status: SHIPPED | OUTPATIENT
Start: 2020-06-24 | End: 2020-09-23 | Stop reason: SDUPTHER

## 2020-06-24 RX ORDER — BUSPIRONE HYDROCHLORIDE 15 MG/1
15 TABLET ORAL 3 TIMES DAILY
Qty: 90 TABLET | Refills: 3 | Status: SHIPPED | OUTPATIENT
Start: 2020-06-24 | End: 2020-09-23 | Stop reason: SDUPTHER

## 2020-06-24 NOTE — PROGRESS NOTES
ESTABLISHED OUTPATIENT VISIT   E/M LEVEL 3: 73925    ENCOUNTER DATE: 6/24/2020  SITE: [facility] [address]    HISTORY    CHIEF COMPLAINT   Min Nunez is a 59 y.o. male who presents for follow up of anxiety/depression.      HPI    Wife present during today's visit.    Continues to struggle with physical issues.    Overall appears stable psychiatrically.    Satisfied with current psychotropic medication regimen.    Pt. Would like to switch from Klonopin to Xanax.    Psychiatric Review Of Systems - Is patient experiencing or having changes in:  sleep: varies  appetite: no  weight: no  energy/anergy: no  interest/pleasure/anhedonia: no  somatic symptoms: no  libido: no  anxiety/panic: anxiety at times  guilty/hopelessness: no  concentration: no  S.I.B.s/risky behavior: no  Irritability: no  Racing thoughts: no  Impulsive behaviors: no  Paranoia:no  AVH:no    Recent alcohol: no  Recent drug: no    Medical ROS   Multiple physical problems     PAST MEDICAL, FAMILY AND SOCIAL HISTORY: The patient's past medical, family and social history have been reviewed and updated as appropriate within the electronic medical record - see encounter notes.    PSYCHOTROPIC MEDICATIONS   Trazodone  mg at bedtime[at times does not need], Buspar 15 mg tid, Celexa 40 mg qam, Depakote 500 mg tid[for headaches], Hydroxyzine prn for migraines, Klonopin 0.5 mg prn[takes on average about 3 days per week]  Scheduled and PRN Medications     Current Outpatient Medications:     albuterol (PROAIR HFA) 90 mcg/actuation inhaler, Inhale 2 puffs into the lungs every 6 (six) hours as needed for Wheezing., Disp: 18 g, Rfl: 0    ALPRAZolam (XANAX) 0.5 MG tablet, Take 1 tablet (0.5 mg total) by mouth daily as needed for Anxiety., Disp: 30 tablet, Rfl: 0    busPIRone (BUSPAR) 15 MG tablet, Take 1 tablet (15 mg total) by mouth 3 (three) times daily., Disp: 90 tablet, Rfl: 3    chlorhexidine (PERIDEX) 0.12 % solution, swish AND  SPIT FIFTEEN MILLILITERS BY MOUTH TWICE DAILY, Disp: , Rfl: 0    citalopram (CELEXA) 40 MG tablet, Take 1 tablet (40 mg total) by mouth once daily., Disp: 30 tablet, Rfl: 3    clonazePAM (KLONOPIN) 0.5 MG tablet, Take 1 tablet (0.5 mg total) by mouth daily as needed for Anxiety., Disp: 30 tablet, Rfl: 3    divalproex (DEPAKOTE) 250 MG EC tablet, 1 pill in morning, 1 pill in evening (Patient taking differently: Take 500 mg by mouth 2 (two) times daily. ), Disp: 60 tablet, Rfl: 6    fenofibrate 160 MG Tab, Take 160 mg by mouth once daily., Disp: , Rfl:     gabapentin (NEURONTIN) 300 MG capsule, Take 300 mg by mouth once daily., Disp: , Rfl:     hydrOXYzine pamoate (VISTARIL) 25 MG Cap, 1 pill 3x a day for 2 days, rest left over every 8 hours as needed for migraine (Patient taking differently: Take 25 mg by mouth once daily. 1 pill 3x a day for 2 days, rest left over every 8 hours as needed for migraine), Disp: 15 capsule, Rfl: 3    hydrOXYzine pamoate (VISTARIL) 25 MG Cap, Take 1 capsule (25 mg total) by mouth 3 (three) times daily as needed., Disp: 30 capsule, Rfl: 3    irbesartan (AVAPRO) 300 MG tablet, Take 300 mg by mouth once daily., Disp: , Rfl:     isosorbide mononitrate (IMDUR) 60 MG 24 hr tablet, Take 60 mg by mouth every morning., Disp: , Rfl: 3    lidocaine HCl 3 % Crea, APPLY 2 grams to the painful area THREE TIMES A DAY, Disp: , Rfl: 3    meloxicam (MOBIC) 7.5 MG tablet, Take 7.5 mg by mouth 2 (two) times daily. , Disp: , Rfl:     metoprolol succinate (TOPROL-XL) 25 MG 24 hr tablet, Take 25 mg by mouth 2 (two) times daily. , Disp: , Rfl:     midodrine (PROAMATINE) 2.5 MG Tab, Take 2.5 mg by mouth 2 (two) times daily with meals. , Disp: , Rfl:     mirabegron (MYRBETRIQ) 25 mg Tb24 ER tablet, Take 1 tablet (25 mg total) by mouth once daily., Disp: 90 tablet, Rfl: 3    mupirocin (BACTROBAN) 2 % ointment, 2 (two) times daily., Disp: , Rfl: 0    nitroGLYCERIN (NITROSTAT) 0.4 MG SL tablet, Place  0.4 mg under the tongue every 5 (five) minutes as needed for Chest pain., Disp: , Rfl:     NON FORMULARY MEDICATION, Apply topically 4 (four) times daily as needed. Gabapentin 5%, Orphenadrine 2%, Lidocaine 2%, Prilocaine 2%, Disp: , Rfl:     omega-3 acid ethyl esters (LOVAZA) 1 gram capsule, Take 2 capsules by mouth 2 (two) times daily., Disp: , Rfl: 3    oxybutynin (DITROPAN-XL) 10 MG 24 hr tablet, Take 1 tablet (10 mg total) by mouth once daily., Disp: 90 tablet, Rfl: 3    oxycodone-acetaminophen (PERCOCET)  mg per tablet, Take 1 tablet by mouth every 4 (four) hours as needed for Pain., Disp: 30 tablet, Rfl: 0    pravastatin (PRAVACHOL) 80 MG tablet, Take 80 mg by mouth once daily., Disp: , Rfl: 4    RANEXA 1,000 mg Tb12, Take 1,000 mg by mouth 2 (two) times daily., Disp: , Rfl: 3    RESTASIS 0.05 % ophthalmic emulsion, Place 1 drop into both eyes 2 (two) times daily., Disp: , Rfl: 4    tamsulosin (FLOMAX) 0.4 mg Cap, Take 1 capsule (0.4 mg total) by mouth once daily., Disp: 90 capsule, Rfl: 3    tizanidine (ZANAFLEX) 4 MG tablet, ONE TABLET BY MOUTH IN THE EVENING, Disp: , Rfl: 2    traZODone (DESYREL) 150 MG tablet, TAKE 1 TO 2 TABLETS BY MOUTH AT BEDTIME for sleep, Disp: 45 tablet, Rfl: 3    XARELTO 15 mg Tab, ONE TABLET BY MOUTH TWICE DAILY FOR 21 DAYS, Disp: , Rfl: 0    EXAMINATION    Vitals:    06/24/20 1510   BP: 136/80   Pulse: 98   Weight 230 lb's    CONSTITUTIONAL  General Appearance: well nourished    MUSCULOSKELETAL  Muscle Strength and Tone: normal strength and tone  Abnormal Involuntary Movements: no abnormal movement noted  Gait and Station: normal gait    PSYCHIATRIC   Level of Consciousness: alert  Orientation: oriented to person, place and time  Grooming: well groomed  Psychomotor Behavior: no restlessness/agitation  Speech: normal in rate, rhythm and volume  Language: normal vocabulary  Mood: anxious at times  Affect: full range and appropriate  Thought Process: logical and  goal directed  Associations: intact associations  Thought Content: no SI/HI  Memory: grossly intact  Attention: intact to content of interview  Fund of Knowledge: appears adequate  Insight: good  Judgement: good    MEDICAL DECISION MAKING    DIAGNOSES  Anxiety d/o, unspecified    PROBLEM LIST AND MANAGEMENT PLANS    - anxiety:   Continue Celexa, Buspar, Trazodone as above   Discontinue Klonopin 0.5 mg prn, re-start Xanax 0.5 mg prn  - rtc 3 months     Time with patient: 20 min    LABORATORY DATA  No visits with results within 3 Month(s) from this visit.   Latest known visit with results is:   Office Visit on 02/11/2020   Component Date Value Ref Range Status    Color, UA 02/11/2020 yellow   Final    Spec Grav UA 02/11/2020 1,000   Final    pH, UA 02/11/2020 6   Final    WBC, UA 02/11/2020 neg   Final    Nitrite, UA 02/11/2020 neg   Final    Protein 02/11/2020 neg   Final    Glucose, UA 02/11/2020 neg   Final    Ketones, UA 02/11/2020 neg   Final    Urobilinogen, UA 02/11/2020 neg   Final    Bilirubin 02/11/2020 neg   Final    Blood, UA 02/11/2020 neg   Final           Sampson Luke

## 2020-06-30 DIAGNOSIS — R35.0 URINARY FREQUENCY: ICD-10-CM

## 2020-06-30 DIAGNOSIS — R39.15 URINARY URGENCY: ICD-10-CM

## 2020-06-30 NOTE — TELEPHONE ENCOUNTER
----- Message from Gaby Cazares sent at 6/30/2020  2:33 PM CDT -----  Contact: Patient [phone]  Type: RX Refill Request    Who Called: Patient    Have you contacted your pharmacy:No    Refill or New Rx:Refill    RX Name and Strength: oxybutynin (DITROPAN-XL) 10 MG 24 hr tablet, mirabegron (MYRBETRIQ) 25 mg Tb24 ER tablet,     Is this a 30 day or 90 day RX: 90 day    Preferred Pharmacy with phone number: .  .  [id number] [address] AT [address] [address]  Phone: [phone] Fax: [phone]    Local or Mail Order: Local    Would the patient rather a call back or a response via [facility]? Call back    Best Call Back Number: [phone]

## 2020-07-01 RX ORDER — MIRABEGRON 25 MG/1
25 TABLET, FILM COATED, EXTENDED RELEASE ORAL DAILY
Qty: 90 TABLET | Refills: 3 | Status: SHIPPED | OUTPATIENT
Start: 2020-07-01 | End: 2020-10-26 | Stop reason: SDUPTHER

## 2020-07-01 RX ORDER — OXYBUTYNIN CHLORIDE 10 MG/1
10 TABLET, EXTENDED RELEASE ORAL DAILY
Qty: 90 TABLET | Refills: 3 | Status: SHIPPED | OUTPATIENT
Start: 2020-07-01 | End: 2020-08-20 | Stop reason: SDUPTHER

## 2020-08-11 ENCOUNTER — HOSPITAL ENCOUNTER (OUTPATIENT)
Dept: RADIOLOGY | Facility: HOSPITAL | Age: 60
Discharge: HOME | End: 2020-08-11
Attending: UROLOGY
Payer: MEDICARE

## 2020-08-11 DIAGNOSIS — N50.819 ORCHALGIA: ICD-10-CM

## 2020-08-11 DIAGNOSIS — R35.0 URINARY FREQUENCY: ICD-10-CM

## 2020-08-11 PROCEDURE — 76870 US EXAM SCROTUM: CPT | Mod: TC

## 2020-08-11 PROCEDURE — 76770 US RETROPERITONEAL COMPLETE: ICD-10-PCS | Mod: 26,,, | Performed by: RADIOLOGY

## 2020-08-11 PROCEDURE — 76770 US EXAM ABDO BACK WALL COMP: CPT | Mod: 26,,, | Performed by: RADIOLOGY

## 2020-08-11 PROCEDURE — 76870 US EXAM SCROTUM: CPT | Mod: 26,,, | Performed by: RADIOLOGY

## 2020-08-11 PROCEDURE — 76770 US EXAM ABDO BACK WALL COMP: CPT | Mod: TC

## 2020-08-11 PROCEDURE — 76870 US SCROTUM AND TESTICLES: ICD-10-PCS | Mod: 26,,, | Performed by: RADIOLOGY

## 2020-08-20 ENCOUNTER — OFFICE VISIT (OUTPATIENT)
Dept: UROLOGY | Facility: CLINIC | Age: 60
End: 2020-08-20
Payer: MEDICARE

## 2020-08-20 ENCOUNTER — TELEPHONE (OUTPATIENT)
Dept: UROLOGY | Facility: CLINIC | Age: 60
End: 2020-08-20

## 2020-08-20 VITALS — HEIGHT: 69 IN | BODY MASS INDEX: 34.94 KG/M2 | TEMPERATURE: 99 F | WEIGHT: 235.88 LBS

## 2020-08-20 DIAGNOSIS — N40.0 BPH WITHOUT OBSTRUCTION/LOWER URINARY TRACT SYMPTOMS: ICD-10-CM

## 2020-08-20 DIAGNOSIS — N50.819 ORCHALGIA: Primary | ICD-10-CM

## 2020-08-20 DIAGNOSIS — R39.15 URINARY URGENCY: ICD-10-CM

## 2020-08-20 DIAGNOSIS — R35.1 NOCTURIA MORE THAN TWICE PER NIGHT: ICD-10-CM

## 2020-08-20 LAB
BILIRUB SERPL-MCNC: NORMAL MG/DL
BLOOD URINE, POC: NORMAL
COLOR, POC UA: YELLOW
GLUCOSE UR QL STRIP: NORMAL
KETONES UR QL STRIP: NORMAL
LEUKOCYTE ESTERASE URINE, POC: NORMAL
NITRITE, POC UA: NORMAL
PH, POC UA: 5
PROTEIN, POC: NORMAL
SPECIFIC GRAVITY, POC UA: 1010
UROBILINOGEN, POC UA: NORMAL

## 2020-08-20 PROCEDURE — 99999 PR PBB SHADOW E&M-EST. PATIENT-LVL III: CPT | Mod: PBBFAC,,, | Performed by: UROLOGY

## 2020-08-20 PROCEDURE — 99999 PR PBB SHADOW E&M-EST. PATIENT-LVL III: ICD-10-PCS | Mod: PBBFAC,,, | Performed by: UROLOGY

## 2020-08-20 PROCEDURE — 81001 URINALYSIS AUTO W/SCOPE: CPT | Mod: PBBFAC | Performed by: UROLOGY

## 2020-08-20 PROCEDURE — 99213 OFFICE O/P EST LOW 20 MIN: CPT | Mod: S$PBB,,, | Performed by: UROLOGY

## 2020-08-20 PROCEDURE — 99213 OFFICE O/P EST LOW 20 MIN: CPT | Mod: PBBFAC | Performed by: UROLOGY

## 2020-08-20 PROCEDURE — 99213 PR OFFICE/OUTPT VISIT, EST, LEVL III, 20-29 MIN: ICD-10-PCS | Mod: S$PBB,,, | Performed by: UROLOGY

## 2020-08-20 RX ORDER — OXYBUTYNIN CHLORIDE 10 MG/1
10 TABLET, EXTENDED RELEASE ORAL 2 TIMES DAILY
Qty: 180 TABLET | Refills: 3 | Status: SHIPPED | OUTPATIENT
Start: 2020-08-20 | End: 2020-11-17 | Stop reason: SDUPTHER

## 2020-08-20 RX ORDER — TAMSULOSIN HYDROCHLORIDE 0.4 MG/1
0.8 CAPSULE ORAL DAILY
Qty: 180 CAPSULE | Refills: 3 | Status: SHIPPED | OUTPATIENT
Start: 2020-08-20 | End: 2021-03-16 | Stop reason: SDUPTHER

## 2020-08-20 NOTE — TELEPHONE ENCOUNTER
----- Message from Kelsey Pa sent at 8/20/2020  1:41 PM CDT -----  Regarding: medications  Type: Patient Call Back    Who called:pt    What is the request in detail:pt wants to discuss medications. Call pt    Can the clinic reply by [facility]?    Would the patient rather a call back or a response via [facility]? call    Best call back number:[phone] (home)       Additional Information:

## 2020-08-20 NOTE — PROGRESS NOTES
Subjective:       Patient ID: Min Nunez is a 59 y.o. male who was last seen in this office Visit date not found    Chief Complaint:   Chief Complaint   Patient presents with    Follow-up     3 month f/u with ultrasound        Orchalgia  He had a bilateral epididymectomy on 8/5/2016.  He continues to have right sided swelling and bilateral pain.  His right sided pain is worse than left.   He complains of pain with sitting, standing, getting up to stand, after roly movements, lifting, and walking.  He has difficulty walking or performing exercises.          Urinary frequency and Urgency Incontinence  He has some frequency and urgency that is managed with Ditropan XL 10 mg and Flomax.  [employer] is no longer covered.  He has Myrbetriq is too expensive.    He would like an increase in medication.    ACTIVE MEDICAL ISSUES:  Patient Active Problem List   Diagnosis    Chest pain, exertional    Coronary artery disease, occlusive    Coronary atherosclerosis    Orchalgia    Dizziness    Syncope    Abnormal brain MRI    Migraines    Atypical migraine    HA (headache)    Numbness    Facial droop    Faintness    Groin pain    Constipation    Hypotension    Urinary urgency    Nocturia more than twice per night    Pneumonia of right lower lobe due to infectious organism    Hypertension    Mixed hyperlipidemia    Morbid obesity    Anxiety       ALLERGIES AND MEDICATIONS: updated and reviewed.  Review of patient's allergies indicates:  No Known Allergies  Current Outpatient Medications   Medication Sig    albuterol (PROAIR HFA) 90 mcg/actuation inhaler Inhale 2 puffs into the lungs every 6 (six) hours as needed for Wheezing.    busPIRone (BUSPAR) 15 MG tablet Take 1 tablet (15 mg total) by mouth 3 (three) times daily.    chlorhexidine (PERIDEX) 0.12 % solution swish AND SPIT FIFTEEN MILLILITERS BY MOUTH TWICE DAILY    citalopram (CELEXA) 40 MG tablet Take 1 tablet (40 mg total) by mouth once daily.     divalproex (DEPAKOTE) 250 MG EC tablet 1 pill in morning, 1 pill in evening (Patient taking differently: Take 500 mg by mouth 2 (two) times daily. )    fenofibrate 160 MG Tab Take 160 mg by mouth once daily.    gabapentin (NEURONTIN) 300 MG capsule Take 300 mg by mouth once daily.    hydrOXYzine pamoate (VISTARIL) 25 MG Cap 1 pill 3x a day for 2 days, rest left over every 8 hours as needed for migraine (Patient taking differently: Take 25 mg by mouth once daily. 1 pill 3x a day for 2 days, rest left over every 8 hours as needed for migraine)    hydrOXYzine pamoate (VISTARIL) 25 MG Cap Take 1 capsule (25 mg total) by mouth 3 (three) times daily as needed.    irbesartan (AVAPRO) 300 MG tablet Take 300 mg by mouth once daily.    isosorbide mononitrate (IMDUR) 60 MG 24 hr tablet Take 60 mg by mouth every morning.    lidocaine HCl 3 % Crea APPLY 2 grams to the painful area THREE TIMES A DAY    meloxicam (MOBIC) 7.5 MG tablet Take 7.5 mg by mouth 2 (two) times daily.     metoprolol succinate (TOPROL-XL) 25 MG 24 hr tablet Take 25 mg by mouth 2 (two) times daily.     midodrine (PROAMATINE) 2.5 MG Tab Take 2.5 mg by mouth 2 (two) times daily with meals.     mupirocin (BACTROBAN) 2 % ointment 2 (two) times daily.    nitroGLYCERIN (NITROSTAT) 0.4 MG SL tablet Place 0.4 mg under the tongue every 5 (five) minutes as needed for Chest pain.    NON FORMULARY MEDICATION Apply topically 4 (four) times daily as needed. Gabapentin 5%, Orphenadrine 2%, Lidocaine 2%, Prilocaine 2%    omega-3 acid ethyl esters (LOVAZA) 1 gram capsule Take 2 capsules by mouth 2 (two) times daily.    oxybutynin (DITROPAN-XL) 10 MG 24 hr tablet Take 1 tablet (10 mg total) by mouth 2 (two) times a day.    oxycodone-acetaminophen (PERCOCET)  mg per tablet Take 1 tablet by mouth every 4 (four) hours as needed for Pain.    pravastatin (PRAVACHOL) 80 MG tablet Take 80 mg by mouth once daily.    RANEXA 1,000 mg Tb12 Take 1,000 mg by  "mouth 2 (two) times daily.    RESTASIS 0.05 % ophthalmic emulsion Place 1 drop into both eyes 2 (two) times daily.    tamsulosin (FLOMAX) 0.4 mg Cap Take 2 capsules (0.8 mg total) by mouth once daily.    tizanidine (ZANAFLEX) 4 MG tablet ONE TABLET BY MOUTH IN THE EVENING    traZODone (DESYREL) 150 MG tablet TAKE 1 TO 2 TABLETS BY MOUTH AT BEDTIME for sleep    XARELTO 15 mg Tab ONE TABLET BY MOUTH TWICE DAILY FOR 21 DAYS    ALPRAZolam (XANAX) 0.5 MG tablet Take 1 tablet (0.5 mg total) by mouth daily as needed for Anxiety.    mirabegron (MYRBETRIQ) 25 mg Tb24 ER tablet Take 1 tablet (25 mg total) by mouth once daily. (Patient not taking: Reported on 8/20/2020)     No current facility-administered medications for this visit.        Review of Systems   Constitutional: Negative for activity change, fatigue, fever and unexpected weight change.   HENT: Negative for congestion.    Eyes: Negative for redness.   Respiratory: Negative for chest tightness and shortness of breath.    Cardiovascular: Negative for chest pain and leg swelling.   Gastrointestinal: Negative for abdominal pain, constipation, diarrhea, nausea and vomiting.   Genitourinary: Positive for testicular pain and urgency. Negative for dysuria, flank pain, frequency, hematuria, penile pain, penile swelling and scrotal swelling.   Musculoskeletal: Negative for arthralgias and back pain.   Neurological: Negative for dizziness and light-headedness.   Psychiatric/Behavioral: Negative for behavioral problems and confusion. The patient is not nervous/anxious.    All other systems reviewed and are negative.      Objective:      Vitals:    08/20/20 0945   Temp: 98.6 °F (37 °C)   Weight: 107 kg (235 lb 14.3 oz)   Height: 5' 9" (1.753 m)     Physical Exam   Nursing note and vitals reviewed.  Constitutional: He is oriented to person, place, and time. He appears well-developed.   HENT:   Head: Normocephalic.   Eyes: Conjunctivae are normal.   Neck: Normal range of " motion. Neck supple. No tracheal deviation present. No thyromegaly present.   Cardiovascular: Normal rate and normal heart sounds.    Pulmonary/Chest: Effort normal and breath sounds normal. No respiratory distress. He has no wheezes.   Abdominal: Soft. Bowel sounds are normal. There is no abdominal tenderness. There is no rebound. No hernia.   Genitourinary:    Penis normal.   Right testis shows tenderness. Left testis shows no tenderness. Circumcised.   Musculoskeletal: Normal range of motion. No tenderness.   Lymphadenopathy:     He has no cervical adenopathy.   Neurological: He is alert and oriented to person, place, and time.   Skin: Skin is warm and dry. No rash noted. No erythema.     Psychiatric: His behavior is normal. Judgment and thought content normal.       Urine dipstick shows negative for all components.  Micro exam: negative for WBC's or RBC's.    US Scrotum And Testicles  Order: [id number]  Status:  Final result   Visible to patient:  Yes (Patient Portal)   Next appt:  09/23/2020 at 01:30 PM in Psychiatry (Sampson Luke MD)   Dx:  Orchalgia  Details    Reading Physician Reading Date Result Priority   Joshua Clark MD  [phone]  [phone] 8/11/2020 Routine      Narrative & Impression     EXAMINATION:  US SCROTUM AND TESTICLES     CLINICAL HISTORY:  Testicular pain, unspecified     TECHNIQUE:  Sonography of the scrotum and testes.     COMPARISON:  Multiple prior ultrasounds, most recent from 06/05/2019     FINDINGS:  Right Testicle:     *Size: 4.6 x 2.9 x 3.2 cm  *Appearance: Normal.  *Flow: Normal arterial and venous flow  *Epididymis: Spermatocele versus loculated hydrocele measuring 5.1 x 3.2 x 5.3 cm similar to prior exam.  *Hydrocele: None.  *Varicocele: None.  .     Left Testicle:     *Size: 4.0 x 3.0 x 3.2 cm  *Appearance: Ectasia of the rete testes.  Otherwise normal appearance.  *Flow: Normal arterial and venous flow  *Epididymis: Normal.  *Hydrocele: None.  *Varicocele:  None.  .     Other findings: None.     Impression:     No acute sonographic abnormality.     Stable appearance of spermatocele/loculated hydrocele on the right.        Electronically signed by: Joshua Clark MD  Date:                                            08/11/2020  Time:                                           14:06            Last Resulted: 08/11/20 14:06           US Retroperitoneal Complete (Kidney and  Order: [id number]  Status:  Final result   Visible to patient:  Yes (Patient Portal)   Next appt:  09/23/2020 at 01:30 PM in Psychiatry (Sampson Luke MD)   Dx:  Urinary frequency  Details    Reading Physician Reading Date Result Priority   Joshua Clark MD  [phone]  [phone] 8/11/2020 Routine      Narrative & Impression     EXAMINATION:  US RETROPERITONEAL COMPLETE     CLINICAL HISTORY:  Frequency of micturition     TECHNIQUE:  Ultrasound of the kidneys and urinary bladder was performed including color flow and Doppler evaluation of the kidneys.     COMPARISON:  CT abdomen pelvis from 06/16/2015     FINDINGS:  Right kidney: The right kidney measures 13.6 cm. No cortical thinning. No loss of corticomedullary distinction. Resistive index measures 0.56.  No mass. No renal stone. No hydronephrosis.     Left kidney: The left kidney measures 12.7 cm. No cortical thinning. No loss of corticomedullary distinction. Resistive index measures 0.54.  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  No mass. No renal stone. No hydronephrosis.     The bladder is partially distended at the time of scanning and has an unremarkable appearance.     Impression:     Cortical defect in the midpole of the left kidney, unchanged when compared to prior exam.     Otherwise no significant sonographic abnormality.        Electronically signed by: Joshua Clark MD  Date:                                            08/11/2020  Time:                                            13:37            Last Resulted: 08/11/20 13:37               Assessment:       1. Orchalgia    2. Urinary urgency    3. BPH without obstruction/lower urinary tract symptoms    4. Nocturia more than twice per night          Plan:       1. Urinary urgency    - oxybutynin (DITROPAN-XL) 10 MG 24 hr tablet; Take 1 tablet (10 mg total) by mouth 2 (two) times a day.  Dispense: 180 tablet; Refill: 3  - POCT urinalysis, dipstick or tablet reag    2. BPH without obstruction/lower urinary tract symptoms    - tamsulosin (FLOMAX) 0.4 mg Cap; Take 2 capsules (0.8 mg total) by mouth once daily.  Dispense: 180 capsule; Refill: 3  - Prostate Specific Antigen, Diagnostic; Future    3. Orchalgia  stable    4. Nocturia more than twice per night  Limit evening fluids            No follow-ups on file.

## 2020-09-23 ENCOUNTER — OFFICE VISIT (OUTPATIENT)
Dept: PSYCHIATRY | Facility: CLINIC | Age: 60
End: 2020-09-23
Payer: MEDICARE

## 2020-09-23 DIAGNOSIS — F32.A DEPRESSIVE DISORDER: Primary | ICD-10-CM

## 2020-09-23 PROCEDURE — 99214 OFFICE O/P EST MOD 30 MIN: CPT | Mod: 95,S$PBB,, | Performed by: PSYCHIATRY & NEUROLOGY

## 2020-09-23 PROCEDURE — 99214 PR OFFICE/OUTPT VISIT, EST, LEVL IV, 30-39 MIN: ICD-10-PCS | Mod: 95,S$PBB,, | Performed by: PSYCHIATRY & NEUROLOGY

## 2020-09-23 RX ORDER — BUSPIRONE HYDROCHLORIDE 15 MG/1
15 TABLET ORAL 3 TIMES DAILY
Qty: 90 TABLET | Refills: 3 | Status: SHIPPED | OUTPATIENT
Start: 2020-09-23 | End: 2020-12-17 | Stop reason: SDUPTHER

## 2020-09-23 RX ORDER — TRAZODONE HYDROCHLORIDE 150 MG/1
TABLET ORAL
Qty: 45 TABLET | Refills: 3 | Status: SHIPPED | OUTPATIENT
Start: 2020-09-23 | End: 2020-12-17 | Stop reason: SDUPTHER

## 2020-09-23 RX ORDER — CITALOPRAM 40 MG/1
40 TABLET, FILM COATED ORAL DAILY
Qty: 30 TABLET | Refills: 3 | Status: SHIPPED | OUTPATIENT
Start: 2020-09-23 | End: 2020-12-17 | Stop reason: SDUPTHER

## 2020-09-23 NOTE — PROGRESS NOTES
The patient location is: [place], [place]  The chief complaint leading to consultation is: anxiety/depression    Visit type: audio only    Face to Face time with patient: 15 min  15 minutes of total time spent on the encounter, which includes face to face time and non-face to face time preparing to see the patient (eg, review of tests), Obtaining and/or reviewing separately obtained history, Documenting clinical information in the electronic or other health record, Independently interpreting results (not separately reported) and communicating results to the patient/family/caregiver, or Care coordination (not separately reported).     Each patient to whom he or she provides medical services by telemedicine is:  (1) informed of the relationship between the physician and patient and the respective role of any other health care provider with respect to management of the patient; and (2) notified that he or she may decline to receive medical services by telemedicine and may withdraw from such care at any time.    Notes:     ESTABLISHED OUTPATIENT VISIT   E/M LEVEL 4: [id number]    ENCOUNTER DATE: 9/23/2020  SITE: [facility], [address]    HISTORY    CHIEF COMPLAINT   Min Nunez is a 59 y.o. male who presents for follow up of anxiety/depression.      HPI    Continues to struggle with physical issues.    Overall appears stable psychiatrically.    Satisfied with current psychotropic medication regimen.    Not taking any benzodiazepine for about 3 months.    Psychiatric Review Of Systems - Is patient experiencing or having changes in:  sleep: varies  appetite: no  weight: no  energy/anergy: no  interest/pleasure/anhedonia: no  somatic symptoms: no  libido: no  anxiety/panic: anxiety at times  guilty/hopelessness: no  concentration: no  S.I.B.s/risky behavior: no  Irritability: no  Racing thoughts: no  Impulsive behaviors: no  Paranoia:no  AVH:no    Recent alcohol: no  Recent drug: no    Medical ROS    Multiple physical problems     PAST MEDICAL, FAMILY AND SOCIAL HISTORY: The patient's past medical, family and social history have been reviewed and updated as appropriate within the electronic medical record - see encounter notes.    PSYCHOTROPIC MEDICATIONS   Trazodone  mg at bedtime, Buspar 15 mg tid, Celexa 40 mg qam, Depakote 500 mg tid[for headaches], Hydroxyzine prn for migraines[usually takes 25 mg bid]    Scheduled and PRN Medications     Current Outpatient Medications:     albuterol (PROAIR HFA) 90 mcg/actuation inhaler, Inhale 2 puffs into the lungs every 6 (six) hours as needed for Wheezing., Disp: 18 g, Rfl: 0    ALPRAZolam (XANAX) 0.5 MG tablet, Take 1 tablet (0.5 mg total) by mouth daily as needed for Anxiety., Disp: 30 tablet, Rfl: 3    busPIRone (BUSPAR) 15 MG tablet, Take 1 tablet (15 mg total) by mouth 3 (three) times daily., Disp: 90 tablet, Rfl: 3    chlorhexidine (PERIDEX) 0.12 % solution, swish AND SPIT FIFTEEN MILLILITERS BY MOUTH TWICE DAILY, Disp: , Rfl: 0    citalopram (CELEXA) 40 MG tablet, Take 1 tablet (40 mg total) by mouth once daily., Disp: 30 tablet, Rfl: 3    divalproex (DEPAKOTE) 250 MG EC tablet, 1 pill in morning, 1 pill in evening (Patient taking differently: Take 500 mg by mouth 2 (two) times daily. ), Disp: 60 tablet, Rfl: 6    fenofibrate 160 MG Tab, Take 160 mg by mouth once daily., Disp: , Rfl:     gabapentin (NEURONTIN) 300 MG capsule, Take 300 mg by mouth once daily., Disp: , Rfl:     hydrOXYzine pamoate (VISTARIL) 25 MG Cap, 1 pill 3x a day for 2 days, rest left over every 8 hours as needed for migraine (Patient taking differently: Take 25 mg by mouth once daily. 1 pill 3x a day for 2 days, rest left over every 8 hours as needed for migraine), Disp: 15 capsule, Rfl: 3    hydrOXYzine pamoate (VISTARIL) 25 MG Cap, Take 1 capsule (25 mg total) by mouth 3 (three) times daily as needed., Disp: 30 capsule, Rfl: 3    irbesartan (AVAPRO) 300 MG tablet, Take  300 mg by mouth once daily., Disp: , Rfl:     isosorbide mononitrate (IMDUR) 60 MG 24 hr tablet, Take 60 mg by mouth every morning., Disp: , Rfl: 3    lidocaine HCl 3 % Crea, APPLY 2 grams to the painful area THREE TIMES A DAY, Disp: , Rfl: 3    meloxicam (MOBIC) 7.5 MG tablet, Take 7.5 mg by mouth 2 (two) times daily. , Disp: , Rfl:     metoprolol succinate (TOPROL-XL) 25 MG 24 hr tablet, Take 25 mg by mouth 2 (two) times daily. , Disp: , Rfl:     midodrine (PROAMATINE) 2.5 MG Tab, Take 2.5 mg by mouth 2 (two) times daily with meals. , Disp: , Rfl:     mirabegron (MYRBETRIQ) 25 mg Tb24 ER tablet, Take 1 tablet (25 mg total) by mouth once daily. (Patient not taking: Reported on 8/20/2020), Disp: 90 tablet, Rfl: 3    mupirocin (BACTROBAN) 2 % ointment, 2 (two) times daily., Disp: , Rfl: 0    nitroGLYCERIN (NITROSTAT) 0.4 MG SL tablet, Place 0.4 mg under the tongue every 5 (five) minutes as needed for Chest pain., Disp: , Rfl:     NON FORMULARY MEDICATION, Apply topically 4 (four) times daily as needed. Gabapentin 5%, Orphenadrine 2%, Lidocaine 2%, Prilocaine 2%, Disp: , Rfl:     omega-3 acid ethyl esters (LOVAZA) 1 gram capsule, Take 2 capsules by mouth 2 (two) times daily., Disp: , Rfl: 3    oxybutynin (DITROPAN-XL) 10 MG 24 hr tablet, Take 1 tablet (10 mg total) by mouth 2 (two) times a day., Disp: 180 tablet, Rfl: 3    oxycodone-acetaminophen (PERCOCET)  mg per tablet, Take 1 tablet by mouth every 4 (four) hours as needed for Pain., Disp: 30 tablet, Rfl: 0    pravastatin (PRAVACHOL) 80 MG tablet, Take 80 mg by mouth once daily., Disp: , Rfl: 4    RANEXA 1,000 mg Tb12, Take 1,000 mg by mouth 2 (two) times daily., Disp: , Rfl: 3    RESTASIS 0.05 % ophthalmic emulsion, Place 1 drop into both eyes 2 (two) times daily., Disp: , Rfl: 4    tamsulosin (FLOMAX) 0.4 mg Cap, Take 2 capsules (0.8 mg total) by mouth once daily., Disp: 180 capsule, Rfl: 3    tizanidine (ZANAFLEX) 4 MG tablet, ONE TABLET  BY MOUTH IN THE EVENING, Disp: , Rfl: 2    traZODone (DESYREL) 150 MG tablet, TAKE 1 TO 2 TABLETS BY MOUTH AT BEDTIME for sleep, Disp: 45 tablet, Rfl: 3    XARELTO 15 mg Tab, ONE TABLET BY MOUTH TWICE DAILY FOR 21 DAYS, Disp: , Rfl: 0    EXAMINATION    There were no vitals filed for this visit.    CONSTITUTIONAL  General Appearance: well nourished    MUSCULOSKELETAL  Muscle Strength and Tone: normal strength and tone  Abnormal Involuntary Movements: no abnormal movement noted  Gait and Station: normal gait    PSYCHIATRIC   Level of Consciousness: alert  Orientation: oriented to person, place and time  Grooming: well groomed  Psychomotor Behavior: no restlessness/agitation  Speech: normal in rate, rhythm and volume  Language: normal vocabulary  Mood: anxious at times  Affect: full range and appropriate  Thought Process: logical and goal directed  Associations: intact associations  Thought Content: no SI/HI  Memory: grossly intact  Attention: intact to content of interview  Fund of Knowledge: appears adequate  Insight: good  Judgement: good    MEDICAL DECISION MAKING    DIAGNOSES  Anxiety d/o, unspecified    PROBLEM LIST AND MANAGEMENT PLANS    - anxiety:   Continue Celexa, Buspar, Trazodone as above   - rtc 3 months     Time with patient: 15 min    LABORATORY DATA  Office Visit on 08/20/2020   Component Date Value Ref Range Status    Color, UA 08/20/2020 Yellow   Final    Spec Grav UA 08/20/2020 1,010   Final    pH, UA 08/20/2020 5   Final    WBC, UA 08/20/2020 neg   Final    Nitrite, UA 08/20/2020 neg   Final    Protein 08/20/2020 neg   Final    Glucose, UA 08/20/2020 neg   Final    Ketones, UA 08/20/2020 neg   Final    Urobilinogen, UA 08/20/2020 neg   Final    Bilirubin 08/20/2020 neg   Final    Blood, UA 08/20/2020 neg   Final           Sampson Luke

## 2020-10-26 DIAGNOSIS — R35.0 URINARY FREQUENCY: ICD-10-CM

## 2020-10-26 RX ORDER — MIRABEGRON 25 MG/1
25 TABLET, FILM COATED, EXTENDED RELEASE ORAL DAILY
Qty: 90 TABLET | Refills: 3 | Status: SHIPPED | OUTPATIENT
Start: 2020-10-26 | End: 2020-11-17 | Stop reason: SDUPTHER

## 2020-11-13 ENCOUNTER — LAB VISIT (OUTPATIENT)
Dept: LAB | Facility: HOSPITAL | Age: 60
End: 2020-11-13
Attending: UROLOGY
Payer: MEDICARE

## 2020-11-13 DIAGNOSIS — N40.0 BPH WITHOUT OBSTRUCTION/LOWER URINARY TRACT SYMPTOMS: ICD-10-CM

## 2020-11-13 LAB — COMPLEXED PSA SERPL-MCNC: 0.39 NG/ML (ref 0–4)

## 2020-11-13 PROCEDURE — 84153 ASSAY OF PSA TOTAL: CPT

## 2020-11-13 PROCEDURE — 36415 COLL VENOUS BLD VENIPUNCTURE: CPT | Mod: PO

## 2020-11-17 ENCOUNTER — OFFICE VISIT (OUTPATIENT)
Dept: UROLOGY | Facility: CLINIC | Age: 60
End: 2020-11-17
Payer: MEDICARE

## 2020-11-17 VITALS — BODY MASS INDEX: 34.68 KG/M2 | WEIGHT: 234.13 LBS | HEIGHT: 69 IN

## 2020-11-17 DIAGNOSIS — N50.812 PAIN IN BOTH TESTICLES: ICD-10-CM

## 2020-11-17 DIAGNOSIS — R35.0 URINARY FREQUENCY: ICD-10-CM

## 2020-11-17 DIAGNOSIS — N40.0 BPH WITHOUT OBSTRUCTION/LOWER URINARY TRACT SYMPTOMS: ICD-10-CM

## 2020-11-17 DIAGNOSIS — N50.811 PAIN IN BOTH TESTICLES: ICD-10-CM

## 2020-11-17 DIAGNOSIS — R39.15 URINARY URGENCY: Primary | ICD-10-CM

## 2020-11-17 PROCEDURE — 99999 PR PBB SHADOW E&M-EST. PATIENT-LVL III: ICD-10-PCS | Mod: PBBFAC,,, | Performed by: UROLOGY

## 2020-11-17 PROCEDURE — 81001 URINALYSIS AUTO W/SCOPE: CPT | Mod: PBBFAC | Performed by: UROLOGY

## 2020-11-17 PROCEDURE — 99999 PR PBB SHADOW E&M-EST. PATIENT-LVL III: CPT | Mod: PBBFAC,,, | Performed by: UROLOGY

## 2020-11-17 PROCEDURE — 99213 PR OFFICE/OUTPT VISIT, EST, LEVL III, 20-29 MIN: ICD-10-PCS | Mod: S$PBB,,, | Performed by: UROLOGY

## 2020-11-17 PROCEDURE — 99213 OFFICE O/P EST LOW 20 MIN: CPT | Mod: S$PBB,,, | Performed by: UROLOGY

## 2020-11-17 PROCEDURE — 99213 OFFICE O/P EST LOW 20 MIN: CPT | Mod: PBBFAC | Performed by: UROLOGY

## 2020-11-17 RX ORDER — OXYBUTYNIN CHLORIDE 10 MG/1
10 TABLET, EXTENDED RELEASE ORAL 2 TIMES DAILY
Qty: 180 TABLET | Refills: 3 | Status: SHIPPED | OUTPATIENT
Start: 2020-11-17 | End: 2021-03-16 | Stop reason: SDUPTHER

## 2020-11-17 RX ORDER — MIRABEGRON 25 MG/1
25 TABLET, FILM COATED, EXTENDED RELEASE ORAL DAILY
Qty: 90 TABLET | Refills: 3 | Status: SHIPPED | OUTPATIENT
Start: 2020-11-17 | End: 2021-03-16 | Stop reason: SDUPTHER

## 2020-11-17 NOTE — PROGRESS NOTES
Subjective:       Patient ID: Min Nunez is a 59 y.o. male who was last seen in this office Visit date not found    Chief Complaint:   Chief Complaint   Patient presents with    Testicle Pain     3 month f/u with labwork        Orchalgia  He had a bilateral epididymectomy on 8/5/2016.  He continues to have right sided swelling and bilateral pain.  His right sided pain is worse than left.   He complains of pain with sitting, standing, getting up to stand, after roly movements, lifting, and walking.  He has difficulty walking or performing exercises.          Urinary frequency and Urgency Incontinence  He has some frequency and urgency that is managed with Ditropan XL 10 mg and Myrbetriq 25 mg and Flomax.  [employer] is no longer covered.      He is doing okay.  He feels this his bladder is holding better.    IPSS Questionnaire (AUA-7):  Over the past month    1)  How often have you had a sensation of not emptying your bladder completely after you finish urinating?  3 - About half the time   2)  How often have you had to urinate again less than two hours after you finished urinating? 3 - About half the time   3)  How often have you found you stopped and started again several times when you urinated?  3 - About half the time   4) How difficult have you found it to postpone urination?  5 - Almost always   5) How often have you had a weak urinary stream?  4 - More than half the time   6) How often have you had to push or strain to begin urination?  1 - Less than 1 time in 5   7) How many times did you most typically get up to urinate from the time you went to bed until the time you got up in the morning?  3 - 3 times   Total score:  0-7 mildly symptomatic    8-19 moderately symptomatic    20-35 severely symptomatic        ACTIVE MEDICAL ISSUES:  Patient Active Problem List   Diagnosis    Chest pain, exertional    Coronary artery disease, occlusive    Coronary atherosclerosis    Orchalgia    Dizziness     Syncope    Abnormal brain MRI    Migraines    Atypical migraine    HA (headache)    Numbness    Facial droop    Faintness    Groin pain    Constipation    Hypotension    Urinary urgency    Nocturia more than twice per night    Pneumonia of right lower lobe due to infectious organism    Hypertension    Mixed hyperlipidemia    Morbid obesity    Anxiety       ALLERGIES AND MEDICATIONS: updated and reviewed.  Review of patient's allergies indicates:  No Known Allergies  Current Outpatient Medications   Medication Sig    albuterol (PROAIR HFA) 90 mcg/actuation inhaler Inhale 2 puffs into the lungs every 6 (six) hours as needed for Wheezing.    busPIRone (BUSPAR) 15 MG tablet Take 1 tablet (15 mg total) by mouth 3 (three) times daily.    chlorhexidine (PERIDEX) 0.12 % solution swish AND SPIT FIFTEEN MILLILITERS BY MOUTH TWICE DAILY    citalopram (CELEXA) 40 MG tablet Take 1 tablet (40 mg total) by mouth once daily.    divalproex (DEPAKOTE) 250 MG EC tablet 1 pill in morning, 1 pill in evening (Patient taking differently: Take 500 mg by mouth 2 (two) times daily. )    fenofibrate 160 MG Tab Take 160 mg by mouth once daily.    gabapentin (NEURONTIN) 300 MG capsule Take 300 mg by mouth once daily.    hydrOXYzine pamoate (VISTARIL) 25 MG Cap 1 pill 3x a day for 2 days, rest left over every 8 hours as needed for migraine (Patient taking differently: Take 25 mg by mouth once daily. 1 pill 3x a day for 2 days, rest left over every 8 hours as needed for migraine)    hydrOXYzine pamoate (VISTARIL) 25 MG Cap Take 1 capsule (25 mg total) by mouth 3 (three) times daily as needed.    irbesartan (AVAPRO) 300 MG tablet Take 300 mg by mouth once daily.    isosorbide mononitrate (IMDUR) 60 MG 24 hr tablet Take 60 mg by mouth every morning.    lidocaine HCl 3 % Crea APPLY 2 grams to the painful area THREE TIMES A DAY    meloxicam (MOBIC) 7.5 MG tablet Take 7.5 mg by mouth 2 (two) times daily.     metoprolol  succinate (TOPROL-XL) 25 MG 24 hr tablet Take 25 mg by mouth 2 (two) times daily.     midodrine (PROAMATINE) 2.5 MG Tab Take 2.5 mg by mouth 2 (two) times daily with meals.     mirabegron (MYRBETRIQ) 25 mg Tb24 ER tablet Take 1 tablet (25 mg total) by mouth once daily.    mupirocin (BACTROBAN) 2 % ointment 2 (two) times daily.    nitroGLYCERIN (NITROSTAT) 0.4 MG SL tablet Place 0.4 mg under the tongue every 5 (five) minutes as needed for Chest pain.    NON FORMULARY MEDICATION Apply topically 4 (four) times daily as needed. Gabapentin 5%, Orphenadrine 2%, Lidocaine 2%, Prilocaine 2%    omega-3 acid ethyl esters (LOVAZA) 1 gram capsule Take 2 capsules by mouth 2 (two) times daily.    oxybutynin (DITROPAN-XL) 10 MG 24 hr tablet Take 1 tablet (10 mg total) by mouth 2 (two) times a day.    oxycodone-acetaminophen (PERCOCET)  mg per tablet Take 1 tablet by mouth every 4 (four) hours as needed for Pain.    pravastatin (PRAVACHOL) 80 MG tablet Take 80 mg by mouth once daily.    RANEXA 1,000 mg Tb12 Take 1,000 mg by mouth 2 (two) times daily.    RESTASIS 0.05 % ophthalmic emulsion Place 1 drop into both eyes 2 (two) times daily.    tamsulosin (FLOMAX) 0.4 mg Cap Take 2 capsules (0.8 mg total) by mouth once daily.    tizanidine (ZANAFLEX) 4 MG tablet ONE TABLET BY MOUTH IN THE EVENING    traZODone (DESYREL) 150 MG tablet TAKE 1 TO 2 TABLETS BY MOUTH AT BEDTIME for sleep    XARELTO 15 mg Tab ONE TABLET BY MOUTH TWICE DAILY FOR 21 DAYS    ALPRAZolam (XANAX) 0.5 MG tablet Take 1 tablet (0.5 mg total) by mouth daily as needed for Anxiety.     No current facility-administered medications for this visit.        Review of Systems   Constitutional: Negative for activity change, fatigue, fever and unexpected weight change.   HENT: Negative for congestion.    Eyes: Negative for redness.   Respiratory: Negative for chest tightness and shortness of breath.    Cardiovascular: Negative for chest pain and leg  "swelling.   Gastrointestinal: Negative for abdominal pain, constipation, diarrhea, nausea and vomiting.   Genitourinary: Positive for testicular pain and urgency. Negative for dysuria, flank pain, frequency, hematuria, penile pain, penile swelling and scrotal swelling.   Musculoskeletal: Negative for arthralgias and back pain.   Neurological: Negative for dizziness and light-headedness.   Psychiatric/Behavioral: Negative for behavioral problems and confusion. The patient is not nervous/anxious.    All other systems reviewed and are negative.      Objective:      Vitals:    11/17/20 1034   Weight: 106.2 kg (234 lb 2.1 oz)   Height: 5' 9" (1.753 m)     Physical Exam  Vitals signs and nursing note reviewed.   Constitutional:       Appearance: He is well-developed.   HENT:      Head: Normocephalic.   Eyes:      Conjunctiva/sclera: Conjunctivae normal.   Neck:      Musculoskeletal: Normal range of motion and neck supple.      Thyroid: No thyromegaly.      Trachea: No tracheal deviation.   Cardiovascular:      Rate and Rhythm: Normal rate.      Heart sounds: Normal heart sounds.   Pulmonary:      Effort: Pulmonary effort is normal. No respiratory distress.      Breath sounds: Normal breath sounds. No wheezing.   Abdominal:      General: Bowel sounds are normal.      Palpations: Abdomen is soft.      Tenderness: There is no abdominal tenderness. There is no rebound.      Hernia: No hernia is present.   Genitourinary:     Penis: Normal and circumcised.       Scrotum/Testes:         Right: Tenderness present.         Left: Tenderness not present.   Musculoskeletal: Normal range of motion.         General: No tenderness.   Lymphadenopathy:      Cervical: No cervical adenopathy.   Skin:     General: Skin is warm and dry.      Findings: No erythema or rash.   Neurological:      Mental Status: He is alert and oriented to person, place, and time.   Psychiatric:         Behavior: Behavior normal.         Thought Content: Thought " content normal.         Judgment: Judgment normal.         Urine dipstick shows negative for all components.  Micro exam: negative for WBC's or RBC's.    US Scrotum And Testicles  Order: [id number]  Status:  Final result   Visible to patient:  Yes (Patient Portal)   Next appt:  09/23/2020 at 01:30 PM in Psychiatry (Sampson Luke MD)   Dx:  Orchalgia  Details    Reading Physician Reading Date Result Priority   Joshua Clark MD  [phone]  [phone] 8/11/2020 Routine      Narrative & Impression     EXAMINATION:  US SCROTUM AND TESTICLES     CLINICAL HISTORY:  Testicular pain, unspecified     TECHNIQUE:  Sonography of the scrotum and testes.     COMPARISON:  Multiple prior ultrasounds, most recent from 06/05/2019     FINDINGS:  Right Testicle:     *Size: 4.6 x 2.9 x 3.2 cm  *Appearance: Normal.  *Flow: Normal arterial and venous flow  *Epididymis: Spermatocele versus loculated hydrocele measuring 5.1 x 3.2 x 5.3 cm similar to prior exam.  *Hydrocele: None.  *Varicocele: None.  .     Left Testicle:     *Size: 4.0 x 3.0 x 3.2 cm  *Appearance: Ectasia of the rete testes.  Otherwise normal appearance.  *Flow: Normal arterial and venous flow  *Epididymis: Normal.  *Hydrocele: None.  *Varicocele: None.  .     Other findings: None.     Impression:     No acute sonographic abnormality.     Stable appearance of spermatocele/loculated hydrocele on the right.        Electronically signed by: Joshua Clark MD  Date:                                            08/11/2020  Time:                                           14:06            Last Resulted: 08/11/20 14:06           US Retroperitoneal Complete (Kidney and  Order: [id number]  Status:  Final result   Visible to patient:  Yes (Patient Portal)   Next appt:  09/23/2020 at 01:30 PM in Psychiatry (Sampson Luke MD)   Dx:  Urinary frequency  Details    Reading Physician Reading Date Result Priority   Joshua Clark MD  [phone]  [phone] 8/11/2020  Routine      Narrative & Impression     EXAMINATION:  US RETROPERITONEAL COMPLETE     CLINICAL HISTORY:  Frequency of micturition     TECHNIQUE:  Ultrasound of the kidneys and urinary bladder was performed including color flow and Doppler evaluation of the kidneys.     COMPARISON:  CT abdomen pelvis from 06/16/2015     FINDINGS:  Right kidney: The right kidney measures 13.6 cm. No cortical thinning. No loss of corticomedullary distinction. Resistive index measures 0.56.  No mass. No renal stone. No hydronephrosis.     Left kidney: The left kidney measures 12.7 cm. No cortical thinning. No loss of corticomedullary distinction. Resistive index measures 0.54.  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  No mass. No renal stone. No hydronephrosis.     The bladder is partially distended at the time of scanning and has an unremarkable appearance.     Impression:     Cortical defect in the midpole of the left kidney, unchanged when compared to prior exam.     Otherwise no significant sonographic abnormality.        Electronically signed by: Joshua Clark MD  Date:                                            08/11/2020  Time:                                           13:37            Last Resulted: 08/11/20 13:37           PSA Diagnostic 0.00 - 4.00 ng/mL 0.39    Comment: PSA Expected levels:   Hormonal Therapy: <0.05 ng/ml   Prostatectomy: <0.01 ng/ml   Radiation Therapy: <1.00 ng/ml    Resulting Agency  [facility]      Narrative  Performed by: [facility]  1 year      Specimen Collected: 11/13/20 12:40   Last Resulted: 11/13/20 19:40            Assessment:       1. Urinary urgency    2. BPH without obstruction/lower urinary tract symptoms    3. Pain in both testicles    4. Urinary frequency          Plan:       1. Urinary urgency  Consider botox or interstim  - oxybutynin (DITROPAN-XL) 10 MG 24 hr tablet; Take 1 tablet (10 mg total) by mouth 2 (two) times a day.  Dispense: 180 tablet;  Refill: 3    2. BPH without obstruction/lower urinary tract symptoms  Flomax 0.8  JEAN next year    3. Pain in both testicles  Stable, seeing pain management    4. Urinary frequency    - mirabegron (MYRBETRIQ) 25 mg Tb24 ER tablet; Take 1 tablet (25 mg total) by mouth once daily.  Dispense: 90 tablet; Refill: 3              Follow up in about 3 months (around 2/17/2021) for Follow up.

## 2020-12-17 ENCOUNTER — OFFICE VISIT (OUTPATIENT)
Dept: PSYCHIATRY | Facility: CLINIC | Age: 60
End: 2020-12-17
Payer: MEDICARE

## 2020-12-17 VITALS
BODY MASS INDEX: 34.46 KG/M2 | SYSTOLIC BLOOD PRESSURE: 172 MMHG | WEIGHT: 233.38 LBS | DIASTOLIC BLOOD PRESSURE: 91 MMHG | HEART RATE: 98 BPM

## 2020-12-17 DIAGNOSIS — F41.9 ANXIETY: Primary | ICD-10-CM

## 2020-12-17 PROCEDURE — 99214 PR OFFICE/OUTPT VISIT, EST, LEVL IV, 30-39 MIN: ICD-10-PCS | Mod: S$PBB,,, | Performed by: PSYCHIATRY & NEUROLOGY

## 2020-12-17 PROCEDURE — 99214 OFFICE O/P EST MOD 30 MIN: CPT | Mod: S$PBB,,, | Performed by: PSYCHIATRY & NEUROLOGY

## 2020-12-17 PROCEDURE — 99999 PR PBB SHADOW E&M-EST. PATIENT-LVL II: CPT | Mod: PBBFAC,,, | Performed by: PSYCHIATRY & NEUROLOGY

## 2020-12-17 PROCEDURE — 99999 PR PBB SHADOW E&M-EST. PATIENT-LVL II: ICD-10-PCS | Mod: PBBFAC,,, | Performed by: PSYCHIATRY & NEUROLOGY

## 2020-12-17 PROCEDURE — 99212 OFFICE O/P EST SF 10 MIN: CPT | Mod: PBBFAC | Performed by: PSYCHIATRY & NEUROLOGY

## 2020-12-17 RX ORDER — BUSPIRONE HYDROCHLORIDE 15 MG/1
15 TABLET ORAL 3 TIMES DAILY
Qty: 270 TABLET | Refills: 1 | Status: SHIPPED | OUTPATIENT
Start: 2020-12-17 | End: 2021-03-17 | Stop reason: SDUPTHER

## 2020-12-17 RX ORDER — CITALOPRAM 40 MG/1
40 TABLET, FILM COATED ORAL DAILY
Qty: 90 TABLET | Refills: 1 | Status: SHIPPED | OUTPATIENT
Start: 2020-12-17 | End: 2021-03-17 | Stop reason: SDUPTHER

## 2020-12-17 RX ORDER — TRAZODONE HYDROCHLORIDE 150 MG/1
TABLET ORAL
Qty: 90 TABLET | Refills: 1 | Status: SHIPPED | OUTPATIENT
Start: 2020-12-17 | End: 2021-03-17 | Stop reason: SDUPTHER

## 2020-12-17 NOTE — PROGRESS NOTES
ESTABLISHED OUTPATIENT VISIT   E/M LEVEL 4: [id number]    ENCOUNTER DATE: 12/17/2020  SITE: [facility], [address]    HISTORY    CHIEF COMPLAINT   Min Nunez is a 60 y.o. male who presents for follow up of anxiety/depression.    HPI    Continues to struggle with physical issues.    Overall appears stable psychiatrically.    Satisfied with current psychotropic medication regimen.    Watches TV.    Psychiatric Review Of Systems - Is patient experiencing or having changes in:  sleep: varies  appetite: no  weight: no  energy/anergy: no  interest/pleasure/anhedonia: no  somatic symptoms: no  libido: no  anxiety/panic: anxiety at times  guilty/hopelessness: no  concentration: no  S.I.B.s/risky behavior: no  Irritability: no  Racing thoughts: no  Impulsive behaviors: no  Paranoia:no  AVH:no    Recent alcohol: no  Recent drug: no    Medical ROS   Multiple physical problems     PAST MEDICAL, FAMILY AND SOCIAL HISTORY: The patient's past medical, family and social history have been reviewed and updated as appropriate within the electronic medical record - see encounter notes.    PSYCHOTROPIC MEDICATIONS   Trazodone  mg at bedtime, Buspar 15 mg tid, Celexa 40 mg qam, Depakote 500 mg tid[for headaches], Hydroxyzine prn for migraines[usually takes 25 mg bid]    Scheduled and PRN Medications     Current Outpatient Medications:     albuterol (PROAIR HFA) 90 mcg/actuation inhaler, Inhale 2 puffs into the lungs every 6 (six) hours as needed for Wheezing., Disp: 18 g, Rfl: 0    ALPRAZolam (XANAX) 0.5 MG tablet, Take 1 tablet (0.5 mg total) by mouth daily as needed for Anxiety., Disp: 30 tablet, Rfl: 3    busPIRone (BUSPAR) 15 MG tablet, Take 1 tablet (15 mg total) by mouth 3 (three) times daily., Disp: 90 tablet, Rfl: 3    chlorhexidine (PERIDEX) 0.12 % solution, swish AND SPIT FIFTEEN MILLILITERS BY MOUTH TWICE DAILY, Disp: , Rfl: 0    citalopram (CELEXA) 40 MG tablet, Take 1 tablet (40 mg total) by  mouth once daily., Disp: 30 tablet, Rfl: 3    divalproex (DEPAKOTE) 250 MG EC tablet, 1 pill in morning, 1 pill in evening (Patient taking differently: Take 500 mg by mouth 2 (two) times daily. ), Disp: 60 tablet, Rfl: 6    fenofibrate 160 MG Tab, Take 160 mg by mouth once daily., Disp: , Rfl:     gabapentin (NEURONTIN) 300 MG capsule, Take 300 mg by mouth once daily., Disp: , Rfl:     hydrOXYzine pamoate (VISTARIL) 25 MG Cap, 1 pill 3x a day for 2 days, rest left over every 8 hours as needed for migraine (Patient taking differently: Take 25 mg by mouth once daily. 1 pill 3x a day for 2 days, rest left over every 8 hours as needed for migraine), Disp: 15 capsule, Rfl: 3    hydrOXYzine pamoate (VISTARIL) 25 MG Cap, Take 1 capsule (25 mg total) by mouth 3 (three) times daily as needed., Disp: 30 capsule, Rfl: 3    irbesartan (AVAPRO) 300 MG tablet, Take 300 mg by mouth once daily., Disp: , Rfl:     isosorbide mononitrate (IMDUR) 60 MG 24 hr tablet, Take 60 mg by mouth every morning., Disp: , Rfl: 3    lidocaine HCl 3 % Crea, APPLY 2 grams to the painful area THREE TIMES A DAY, Disp: , Rfl: 3    meloxicam (MOBIC) 7.5 MG tablet, Take 7.5 mg by mouth 2 (two) times daily. , Disp: , Rfl:     metoprolol succinate (TOPROL-XL) 25 MG 24 hr tablet, Take 25 mg by mouth 2 (two) times daily. , Disp: , Rfl:     midodrine (PROAMATINE) 2.5 MG Tab, Take 2.5 mg by mouth 2 (two) times daily with meals. , Disp: , Rfl:     mirabegron (MYRBETRIQ) 25 mg Tb24 ER tablet, Take 1 tablet (25 mg total) by mouth once daily., Disp: 90 tablet, Rfl: 3    mupirocin (BACTROBAN) 2 % ointment, 2 (two) times daily., Disp: , Rfl: 0    nitroGLYCERIN (NITROSTAT) 0.4 MG SL tablet, Place 0.4 mg under the tongue every 5 (five) minutes as needed for Chest pain., Disp: , Rfl:     NON FORMULARY MEDICATION, Apply topically 4 (four) times daily as needed. Gabapentin 5%, Orphenadrine 2%, Lidocaine 2%, Prilocaine 2%, Disp: , Rfl:     omega-3 acid ethyl  esters (LOVAZA) 1 gram capsule, Take 2 capsules by mouth 2 (two) times daily., Disp: , Rfl: 3    oxybutynin (DITROPAN-XL) 10 MG 24 hr tablet, Take 1 tablet (10 mg total) by mouth 2 (two) times a day., Disp: 180 tablet, Rfl: 3    oxycodone-acetaminophen (PERCOCET)  mg per tablet, Take 1 tablet by mouth every 4 (four) hours as needed for Pain., Disp: 30 tablet, Rfl: 0    pravastatin (PRAVACHOL) 80 MG tablet, Take 80 mg by mouth once daily., Disp: , Rfl: 4    RANEXA 1,000 mg Tb12, Take 1,000 mg by mouth 2 (two) times daily., Disp: , Rfl: 3    RESTASIS 0.05 % ophthalmic emulsion, Place 1 drop into both eyes 2 (two) times daily., Disp: , Rfl: 4    tamsulosin (FLOMAX) 0.4 mg Cap, Take 2 capsules (0.8 mg total) by mouth once daily., Disp: 180 capsule, Rfl: 3    tizanidine (ZANAFLEX) 4 MG tablet, ONE TABLET BY MOUTH IN THE EVENING, Disp: , Rfl: 2    traZODone (DESYREL) 150 MG tablet, TAKE 1 TO 2 TABLETS BY MOUTH AT BEDTIME for sleep, Disp: 45 tablet, Rfl: 3    XARELTO 15 mg Tab, ONE TABLET BY MOUTH TWICE DAILY FOR 21 DAYS, Disp: , Rfl: 0    EXAMINATION    Vitals:    12/17/20 1302   BP: (!) 172/91   Pulse: 98       CONSTITUTIONAL  General Appearance: well nourished    MUSCULOSKELETAL  Muscle Strength and Tone: normal strength and tone  Abnormal Involuntary Movements: no abnormal movement noted  Gait and Station: normal gait    PSYCHIATRIC   Level of Consciousness: alert  Orientation: oriented to person, place and time  Grooming: well groomed  Psychomotor Behavior: no restlessness/agitation  Speech: normal in rate, rhythm and volume  Language: normal vocabulary  Mood: anxious at times  Affect: full range and appropriate  Thought Process: logical and goal directed  Associations: intact associations  Thought Content: no SI/HI  Memory: grossly intact  Attention: intact to content of interview  Fund of Knowledge: appears adequate  Insight: good  Judgement: good    MEDICAL DECISION MAKING    DIAGNOSES  Anxiety d/o,  unspecified    PROBLEM LIST AND MANAGEMENT PLANS    - anxiety:   Continue Celexa, Buspar, Trazodone as above   - rtc 3 months     Time with patient: 20 min    LABORATORY DATA  Lab Visit on 11/13/2020   Component Date Value Ref Range Status    PSA Diagnostic 11/13/2020 0.39  0.00 - 4.00 ng/mL Final    Comment: PSA Expected levels:  Hormonal Therapy: <0.05 ng/ml  Prostatectomy: <0.01 ng/ml  Radiation Therapy: <1.00 ng/ml             Sampson Luke

## 2021-03-16 ENCOUNTER — OFFICE VISIT (OUTPATIENT)
Dept: UROLOGY | Facility: CLINIC | Age: 61
End: 2021-03-16
Payer: MEDICARE

## 2021-03-16 VITALS — HEIGHT: 69 IN | BODY MASS INDEX: 34.78 KG/M2 | WEIGHT: 234.81 LBS

## 2021-03-16 DIAGNOSIS — N40.0 BPH WITHOUT OBSTRUCTION/LOWER URINARY TRACT SYMPTOMS: ICD-10-CM

## 2021-03-16 DIAGNOSIS — R35.0 URINARY FREQUENCY: ICD-10-CM

## 2021-03-16 DIAGNOSIS — N50.811 PAIN IN BOTH TESTICLES: ICD-10-CM

## 2021-03-16 DIAGNOSIS — N50.812 PAIN IN BOTH TESTICLES: ICD-10-CM

## 2021-03-16 DIAGNOSIS — R39.15 URINARY URGENCY: Primary | ICD-10-CM

## 2021-03-16 LAB
BILIRUB SERPL-MCNC: NORMAL MG/DL
BLOOD URINE, POC: NORMAL
COLOR, POC UA: YELLOW
GLUCOSE UR QL STRIP: POSITIVE
KETONES UR QL STRIP: NORMAL
LEUKOCYTE ESTERASE URINE, POC: NORMAL
NITRITE, POC UA: NORMAL
PH, POC UA: 6
PROTEIN, POC: NORMAL
SPECIFIC GRAVITY, POC UA: 1030
UROBILINOGEN, POC UA: NORMAL

## 2021-03-16 PROCEDURE — 99999 PR PBB SHADOW E&M-EST. PATIENT-LVL IV: ICD-10-PCS | Mod: PBBFAC,,, | Performed by: UROLOGY

## 2021-03-16 PROCEDURE — 81001 URINALYSIS AUTO W/SCOPE: CPT | Mod: PBBFAC | Performed by: UROLOGY

## 2021-03-16 PROCEDURE — 99214 OFFICE O/P EST MOD 30 MIN: CPT | Mod: PBBFAC | Performed by: UROLOGY

## 2021-03-16 PROCEDURE — 99214 PR OFFICE/OUTPT VISIT, EST, LEVL IV, 30-39 MIN: ICD-10-PCS | Mod: S$PBB,,, | Performed by: UROLOGY

## 2021-03-16 PROCEDURE — 99214 OFFICE O/P EST MOD 30 MIN: CPT | Mod: S$PBB,,, | Performed by: UROLOGY

## 2021-03-16 PROCEDURE — 99999 PR PBB SHADOW E&M-EST. PATIENT-LVL IV: CPT | Mod: PBBFAC,,, | Performed by: UROLOGY

## 2021-03-16 RX ORDER — MIRABEGRON 25 MG/1
25 TABLET, FILM COATED, EXTENDED RELEASE ORAL DAILY
Qty: 90 TABLET | Refills: 3 | Status: SHIPPED | OUTPATIENT
Start: 2021-03-16 | End: 2021-07-14

## 2021-03-16 RX ORDER — OXYBUTYNIN CHLORIDE 10 MG/1
10 TABLET, EXTENDED RELEASE ORAL 2 TIMES DAILY
Qty: 180 TABLET | Refills: 3 | Status: SHIPPED | OUTPATIENT
Start: 2021-03-16 | End: 2021-07-14 | Stop reason: SDUPTHER

## 2021-03-16 RX ORDER — TAMSULOSIN HYDROCHLORIDE 0.4 MG/1
0.8 CAPSULE ORAL DAILY
Qty: 180 CAPSULE | Refills: 3 | Status: SHIPPED | OUTPATIENT
Start: 2021-03-16 | End: 2021-07-14 | Stop reason: SDUPTHER

## 2021-03-17 ENCOUNTER — OFFICE VISIT (OUTPATIENT)
Dept: PSYCHIATRY | Facility: CLINIC | Age: 61
End: 2021-03-17
Payer: MEDICARE

## 2021-03-17 VITALS
HEIGHT: 69 IN | WEIGHT: 232.25 LBS | DIASTOLIC BLOOD PRESSURE: 93 MMHG | SYSTOLIC BLOOD PRESSURE: 154 MMHG | HEART RATE: 89 BPM | BODY MASS INDEX: 34.4 KG/M2

## 2021-03-17 DIAGNOSIS — F41.9 ANXIETY: Primary | ICD-10-CM

## 2021-03-17 PROCEDURE — 99212 OFFICE O/P EST SF 10 MIN: CPT | Mod: PBBFAC | Performed by: PSYCHIATRY & NEUROLOGY

## 2021-03-17 PROCEDURE — 99214 PR OFFICE/OUTPT VISIT, EST, LEVL IV, 30-39 MIN: ICD-10-PCS | Mod: S$PBB,,, | Performed by: PSYCHIATRY & NEUROLOGY

## 2021-03-17 PROCEDURE — 99999 PR PBB SHADOW E&M-EST. PATIENT-LVL II: ICD-10-PCS | Mod: PBBFAC,,, | Performed by: PSYCHIATRY & NEUROLOGY

## 2021-03-17 PROCEDURE — 99214 OFFICE O/P EST MOD 30 MIN: CPT | Mod: S$PBB,,, | Performed by: PSYCHIATRY & NEUROLOGY

## 2021-03-17 PROCEDURE — 99999 PR PBB SHADOW E&M-EST. PATIENT-LVL II: CPT | Mod: PBBFAC,,, | Performed by: PSYCHIATRY & NEUROLOGY

## 2021-03-17 RX ORDER — BUSPIRONE HYDROCHLORIDE 15 MG/1
15 TABLET ORAL 3 TIMES DAILY
Qty: 270 TABLET | Refills: 1 | Status: SHIPPED | OUTPATIENT
Start: 2021-03-17 | End: 2021-09-14 | Stop reason: SDUPTHER

## 2021-03-17 RX ORDER — TRAZODONE HYDROCHLORIDE 150 MG/1
TABLET ORAL
Qty: 90 TABLET | Refills: 1 | Status: SHIPPED | OUTPATIENT
Start: 2021-03-17 | End: 2021-09-14 | Stop reason: SDUPTHER

## 2021-03-17 RX ORDER — CITALOPRAM 40 MG/1
40 TABLET, FILM COATED ORAL DAILY
Qty: 90 TABLET | Refills: 1 | Status: SHIPPED | OUTPATIENT
Start: 2021-03-17 | End: 2021-09-14 | Stop reason: SDUPTHER

## 2021-06-10 ENCOUNTER — OFFICE VISIT (OUTPATIENT)
Dept: PSYCHIATRY | Facility: CLINIC | Age: 61
End: 2021-06-10
Payer: MEDICARE

## 2021-06-10 VITALS
WEIGHT: 237.31 LBS | BODY MASS INDEX: 35.05 KG/M2 | SYSTOLIC BLOOD PRESSURE: 127 MMHG | DIASTOLIC BLOOD PRESSURE: 71 MMHG | HEART RATE: 93 BPM

## 2021-06-10 DIAGNOSIS — F41.9 ANXIETY: Primary | ICD-10-CM

## 2021-06-10 PROCEDURE — 99999 PR PBB SHADOW E&M-EST. PATIENT-LVL I: ICD-10-PCS | Mod: PBBFAC,,, | Performed by: PSYCHIATRY & NEUROLOGY

## 2021-06-10 PROCEDURE — 99211 OFF/OP EST MAY X REQ PHY/QHP: CPT | Mod: PBBFAC | Performed by: PSYCHIATRY & NEUROLOGY

## 2021-06-10 PROCEDURE — 99999 PR PBB SHADOW E&M-EST. PATIENT-LVL I: CPT | Mod: PBBFAC,,, | Performed by: PSYCHIATRY & NEUROLOGY

## 2021-06-10 PROCEDURE — 99214 PR OFFICE/OUTPT VISIT, EST, LEVL IV, 30-39 MIN: ICD-10-PCS | Mod: S$PBB,,, | Performed by: PSYCHIATRY & NEUROLOGY

## 2021-06-10 PROCEDURE — 99214 OFFICE O/P EST MOD 30 MIN: CPT | Mod: S$PBB,,, | Performed by: PSYCHIATRY & NEUROLOGY

## 2021-07-01 ENCOUNTER — PATIENT MESSAGE (OUTPATIENT)
Dept: ADMINISTRATIVE | Facility: OTHER | Age: 61
End: 2021-07-01

## 2021-07-14 ENCOUNTER — OFFICE VISIT (OUTPATIENT)
Dept: UROLOGY | Facility: CLINIC | Age: 61
End: 2021-07-14
Payer: MEDICARE

## 2021-07-14 VITALS — BODY MASS INDEX: 35.13 KG/M2 | WEIGHT: 237.19 LBS | HEIGHT: 69 IN

## 2021-07-14 DIAGNOSIS — R39.15 URINARY URGENCY: Primary | ICD-10-CM

## 2021-07-14 DIAGNOSIS — N40.0 BPH WITHOUT OBSTRUCTION/LOWER URINARY TRACT SYMPTOMS: ICD-10-CM

## 2021-07-14 DIAGNOSIS — N50.811 PAIN IN BOTH TESTICLES: ICD-10-CM

## 2021-07-14 DIAGNOSIS — N50.812 PAIN IN BOTH TESTICLES: ICD-10-CM

## 2021-07-14 LAB
BILIRUB SERPL-MCNC: NORMAL MG/DL
BLOOD URINE, POC: NORMAL
COLOR, POC UA: YELLOW
GLUCOSE UR QL STRIP: POSITIVE
KETONES UR QL STRIP: NORMAL
LEUKOCYTE ESTERASE URINE, POC: NORMAL
NITRITE, POC UA: NORMAL
PH, POC UA: 6
PROTEIN, POC: NORMAL
SPECIFIC GRAVITY, POC UA: 1000
UROBILINOGEN, POC UA: NORMAL

## 2021-07-14 PROCEDURE — 81001 URINALYSIS AUTO W/SCOPE: CPT | Mod: PBBFAC | Performed by: UROLOGY

## 2021-07-14 PROCEDURE — 99999 PR PBB SHADOW E&M-EST. PATIENT-LVL IV: CPT | Mod: PBBFAC,,, | Performed by: UROLOGY

## 2021-07-14 PROCEDURE — 99213 PR OFFICE/OUTPT VISIT, EST, LEVL III, 20-29 MIN: ICD-10-PCS | Mod: S$PBB,,, | Performed by: UROLOGY

## 2021-07-14 PROCEDURE — 99999 PR PBB SHADOW E&M-EST. PATIENT-LVL IV: ICD-10-PCS | Mod: PBBFAC,,, | Performed by: UROLOGY

## 2021-07-14 PROCEDURE — 99214 OFFICE O/P EST MOD 30 MIN: CPT | Mod: PBBFAC | Performed by: UROLOGY

## 2021-07-14 PROCEDURE — 99213 OFFICE O/P EST LOW 20 MIN: CPT | Mod: S$PBB,,, | Performed by: UROLOGY

## 2021-07-14 RX ORDER — TAMSULOSIN HYDROCHLORIDE 0.4 MG/1
0.8 CAPSULE ORAL DAILY
Qty: 180 CAPSULE | Refills: 3 | Status: SHIPPED | OUTPATIENT
Start: 2021-07-14 | End: 2021-10-20 | Stop reason: SDUPTHER

## 2021-07-14 RX ORDER — OXYBUTYNIN CHLORIDE 10 MG/1
10 TABLET, EXTENDED RELEASE ORAL 2 TIMES DAILY
Qty: 180 TABLET | Refills: 3 | Status: SHIPPED | OUTPATIENT
Start: 2021-07-14 | End: 2021-10-20 | Stop reason: SDUPTHER

## 2021-07-15 ENCOUNTER — TELEPHONE (OUTPATIENT)
Dept: UROLOGY | Facility: CLINIC | Age: 61
End: 2021-07-15
Payer: MEDICARE

## 2021-07-16 ENCOUNTER — TELEPHONE (OUTPATIENT)
Dept: RADIOLOGY | Facility: HOSPITAL | Age: 61
End: 2021-07-16

## 2021-09-14 ENCOUNTER — OFFICE VISIT (OUTPATIENT)
Dept: PSYCHIATRY | Facility: CLINIC | Age: 61
End: 2021-09-14
Payer: MEDICARE

## 2021-09-14 DIAGNOSIS — F41.9 ANXIETY: Primary | ICD-10-CM

## 2021-09-14 PROCEDURE — 99214 OFFICE O/P EST MOD 30 MIN: CPT | Mod: 95,,, | Performed by: PSYCHIATRY & NEUROLOGY

## 2021-09-14 PROCEDURE — 99214 PR OFFICE/OUTPT VISIT, EST, LEVL IV, 30-39 MIN: ICD-10-PCS | Mod: 95,,, | Performed by: PSYCHIATRY & NEUROLOGY

## 2021-09-14 RX ORDER — TRAZODONE HYDROCHLORIDE 150 MG/1
TABLET ORAL
Qty: 90 TABLET | Refills: 1 | Status: SHIPPED | OUTPATIENT
Start: 2021-09-14 | End: 2021-12-29 | Stop reason: SDUPTHER

## 2021-09-14 RX ORDER — CITALOPRAM 40 MG/1
40 TABLET, FILM COATED ORAL DAILY
Qty: 90 TABLET | Refills: 1 | Status: SHIPPED | OUTPATIENT
Start: 2021-09-14 | End: 2021-12-29 | Stop reason: SDUPTHER

## 2021-09-14 RX ORDER — BUSPIRONE HYDROCHLORIDE 15 MG/1
15 TABLET ORAL 3 TIMES DAILY
Qty: 270 TABLET | Refills: 1 | Status: SHIPPED | OUTPATIENT
Start: 2021-09-14 | End: 2021-12-29 | Stop reason: SDUPTHER

## 2021-10-14 ENCOUNTER — TELEPHONE (OUTPATIENT)
Dept: UROLOGY | Facility: CLINIC | Age: 61
End: 2021-10-14

## 2021-10-20 ENCOUNTER — OFFICE VISIT (OUTPATIENT)
Dept: UROLOGY | Facility: CLINIC | Age: 61
End: 2021-10-20
Payer: MEDICARE

## 2021-10-20 VITALS — BODY MASS INDEX: 34.37 KG/M2 | HEIGHT: 69 IN | WEIGHT: 232.06 LBS

## 2021-10-20 DIAGNOSIS — N50.811 PAIN IN BOTH TESTICLES: Primary | ICD-10-CM

## 2021-10-20 DIAGNOSIS — N40.0 BPH WITHOUT OBSTRUCTION/LOWER URINARY TRACT SYMPTOMS: ICD-10-CM

## 2021-10-20 DIAGNOSIS — R39.15 URINARY URGENCY: ICD-10-CM

## 2021-10-20 DIAGNOSIS — N50.812 PAIN IN BOTH TESTICLES: Primary | ICD-10-CM

## 2021-10-20 LAB
BILIRUB SERPL-MCNC: NORMAL MG/DL
BLOOD URINE, POC: NORMAL
CLARITY, POC UA: CLEAR
COLOR, POC UA: YELLOW
GLUCOSE UR QL STRIP: 1000
KETONES UR QL STRIP: NORMAL
LEUKOCYTE ESTERASE URINE, POC: NORMAL
NITRITE, POC UA: NORMAL
PH, POC UA: 6
PROTEIN, POC: NORMAL
SPECIFIC GRAVITY, POC UA: 1010
UROBILINOGEN, POC UA: NORMAL

## 2021-10-20 PROCEDURE — 99999 PR PBB SHADOW E&M-EST. PATIENT-LVL IV: ICD-10-PCS | Mod: PBBFAC,,, | Performed by: UROLOGY

## 2021-10-20 PROCEDURE — 99999 PR PBB SHADOW E&M-EST. PATIENT-LVL IV: CPT | Mod: PBBFAC,,, | Performed by: UROLOGY

## 2021-10-20 PROCEDURE — 99214 OFFICE O/P EST MOD 30 MIN: CPT | Mod: PBBFAC | Performed by: UROLOGY

## 2021-10-20 PROCEDURE — 99214 PR OFFICE/OUTPT VISIT, EST, LEVL IV, 30-39 MIN: ICD-10-PCS | Mod: S$PBB,,, | Performed by: UROLOGY

## 2021-10-20 PROCEDURE — 81002 URINALYSIS NONAUTO W/O SCOPE: CPT | Mod: PBBFAC | Performed by: UROLOGY

## 2021-10-20 PROCEDURE — 99214 OFFICE O/P EST MOD 30 MIN: CPT | Mod: S$PBB,,, | Performed by: UROLOGY

## 2021-10-20 RX ORDER — MIRABEGRON 50 MG/1
1 TABLET, FILM COATED, EXTENDED RELEASE ORAL DAILY
Qty: 30 TABLET | Refills: 11 | Status: SHIPPED | OUTPATIENT
Start: 2021-10-20 | End: 2022-10-28

## 2021-10-20 RX ORDER — GABAPENTIN 300 MG/1
600 CAPSULE ORAL DAILY
Qty: 180 CAPSULE | Refills: 11 | Status: SHIPPED | OUTPATIENT
Start: 2021-10-20

## 2021-10-20 RX ORDER — TAMSULOSIN HYDROCHLORIDE 0.4 MG/1
0.8 CAPSULE ORAL DAILY
Qty: 180 CAPSULE | Refills: 3 | Status: SHIPPED | OUTPATIENT
Start: 2021-10-20 | End: 2022-01-28 | Stop reason: SDUPTHER

## 2021-10-20 RX ORDER — OXYBUTYNIN CHLORIDE 10 MG/1
10 TABLET, EXTENDED RELEASE ORAL 2 TIMES DAILY
Qty: 180 TABLET | Refills: 3 | Status: SHIPPED | OUTPATIENT
Start: 2021-10-20 | End: 2022-01-28 | Stop reason: SDUPTHER

## 2021-12-29 ENCOUNTER — OFFICE VISIT (OUTPATIENT)
Dept: PSYCHIATRY | Facility: CLINIC | Age: 61
End: 2021-12-29
Payer: MEDICARE

## 2021-12-29 DIAGNOSIS — F41.9 ANXIETY: Primary | ICD-10-CM

## 2021-12-29 PROCEDURE — 99214 PR OFFICE/OUTPT VISIT, EST, LEVL IV, 30-39 MIN: ICD-10-PCS | Mod: 95,,, | Performed by: PSYCHIATRY & NEUROLOGY

## 2021-12-29 PROCEDURE — 99214 OFFICE O/P EST MOD 30 MIN: CPT | Mod: 95,,, | Performed by: PSYCHIATRY & NEUROLOGY

## 2021-12-29 RX ORDER — CITALOPRAM 40 MG/1
40 TABLET, FILM COATED ORAL DAILY
Qty: 90 TABLET | Refills: 1 | Status: SHIPPED | OUTPATIENT
Start: 2021-12-29 | End: 2022-03-30 | Stop reason: SDUPTHER

## 2021-12-29 RX ORDER — TRAZODONE HYDROCHLORIDE 150 MG/1
TABLET ORAL
Qty: 90 TABLET | Refills: 1 | Status: SHIPPED | OUTPATIENT
Start: 2021-12-29 | End: 2022-03-30 | Stop reason: SDUPTHER

## 2021-12-29 RX ORDER — BUSPIRONE HYDROCHLORIDE 15 MG/1
15 TABLET ORAL 3 TIMES DAILY
Qty: 270 TABLET | Refills: 1 | Status: SHIPPED | OUTPATIENT
Start: 2021-12-29 | End: 2022-03-30 | Stop reason: SDUPTHER

## 2022-01-28 ENCOUNTER — OFFICE VISIT (OUTPATIENT)
Dept: UROLOGY | Facility: CLINIC | Age: 62
End: 2022-01-28
Payer: MEDICARE

## 2022-01-28 VITALS — BODY MASS INDEX: 34.16 KG/M2 | RESPIRATION RATE: 18 BRPM | WEIGHT: 230.63 LBS | HEIGHT: 69 IN

## 2022-01-28 DIAGNOSIS — N50.812 PAIN IN BOTH TESTICLES: Primary | ICD-10-CM

## 2022-01-28 DIAGNOSIS — R39.15 URINARY URGENCY: ICD-10-CM

## 2022-01-28 DIAGNOSIS — N50.811 PAIN IN BOTH TESTICLES: Primary | ICD-10-CM

## 2022-01-28 DIAGNOSIS — N40.0 BPH WITHOUT OBSTRUCTION/LOWER URINARY TRACT SYMPTOMS: ICD-10-CM

## 2022-01-28 PROCEDURE — 99999 PR PBB SHADOW E&M-EST. PATIENT-LVL III: ICD-10-PCS | Mod: PBBFAC,,, | Performed by: UROLOGY

## 2022-01-28 PROCEDURE — 99214 OFFICE O/P EST MOD 30 MIN: CPT | Mod: S$PBB,,, | Performed by: UROLOGY

## 2022-01-28 PROCEDURE — 99213 OFFICE O/P EST LOW 20 MIN: CPT | Mod: PBBFAC | Performed by: UROLOGY

## 2022-01-28 PROCEDURE — 99214 PR OFFICE/OUTPT VISIT, EST, LEVL IV, 30-39 MIN: ICD-10-PCS | Mod: S$PBB,,, | Performed by: UROLOGY

## 2022-01-28 PROCEDURE — 99999 PR PBB SHADOW E&M-EST. PATIENT-LVL III: CPT | Mod: PBBFAC,,, | Performed by: UROLOGY

## 2022-01-28 PROCEDURE — 81001 URINALYSIS AUTO W/SCOPE: CPT | Mod: PBBFAC | Performed by: UROLOGY

## 2022-01-28 RX ORDER — TAMSULOSIN HYDROCHLORIDE 0.4 MG/1
0.8 CAPSULE ORAL DAILY
Qty: 180 CAPSULE | Refills: 3 | Status: SHIPPED | OUTPATIENT
Start: 2022-01-28 | End: 2022-04-29 | Stop reason: SDUPTHER

## 2022-01-28 RX ORDER — OXYBUTYNIN CHLORIDE 10 MG/1
10 TABLET, EXTENDED RELEASE ORAL 2 TIMES DAILY
Qty: 180 TABLET | Refills: 3 | Status: SHIPPED | OUTPATIENT
Start: 2022-01-28 | End: 2022-04-29 | Stop reason: SDUPTHER

## 2022-01-28 NOTE — PROGRESS NOTES
Subjective:       Patient ID: Min Nunez is a 61 y.o. male The patient's last visit with me was on 10/20/2021.    Chief Complaint:   Chief Complaint   Patient presents with    Follow-up     Patient states he has the same problems, nothing has changed       Orchalgia  He had a bilateral epididymectomy on 8/5/2016.  He continues to have right sided swelling and bilateral pain.  His right sided pain is worse than left.   He complains of pain with sitting, standing, getting up to stand, after roly movements, lifting, and walking.  He has difficulty walking or performing exercises.    10/20/2021   He continues to have pain, and it seems worse recently.   He denies any hematuria, dysuria, or trauma.      01/28/2022  His pain is about the same.  He is trying to avoid heavy lifting.       Urinary frequency and Urgency Incontinence  He has some frequency and urgency that is managed with Ditropan XL 10 mg and Myrbetriq 25 mg and Flomax.  [employer] is no longer covered.      01/28/2022    IPSS Questionnaire (AUA-7):  Over the past month    1)  How often have you had a sensation of not emptying your bladder completely after you finish urinating?  1 - Less than 1 time in 5   2)  How often have you had to urinate again less than two hours after you finished urinating? 5 - Almost always   3)  How often have you found you stopped and started again several times when you urinated?  3 - About half the time   4) How difficult have you found it to postpone urination?  1 - Less than 1 time in 5   5) How often have you had a weak urinary stream?  2 - Less than half the time   6) How often have you had to push or strain to begin urination?  2 - Less than half the time   7) How many times did you most typically get up to urinate from the time you went to bed until the time you got up in the morning?  4 - 4 times   Total score:  0-7 mildly symptomatic    8-19 moderately symptomatic   18/4    20-35 severely symptomatic             ACTIVE MEDICAL ISSUES:  Patient Active Problem List   Diagnosis    Chest pain, exertional    Coronary artery disease, occlusive    Coronary atherosclerosis    Orchalgia    Dizziness    Syncope    Abnormal brain MRI    Migraines    Atypical migraine    HA (headache)    Numbness    Facial droop    Faintness    Groin pain    Constipation    Hypotension    Urinary urgency    Nocturia more than twice per night    Pneumonia of right lower lobe due to infectious organism    Hypertension    Mixed hyperlipidemia    Morbid obesity    Anxiety       ALLERGIES AND MEDICATIONS: updated and reviewed.  Review of patient's allergies indicates:  No Known Allergies  Current Outpatient Medications   Medication Sig    albuterol (PROAIR HFA) 90 mcg/actuation inhaler Inhale 2 puffs into the lungs every 6 (six) hours as needed for Wheezing.    busPIRone (BUSPAR) 15 MG tablet Take 1 tablet (15 mg total) by mouth 3 (three) times daily.    chlorhexidine (PERIDEX) 0.12 % solution swish AND SPIT FIFTEEN MILLILITERS BY MOUTH TWICE DAILY    citalopram (CELEXA) 40 MG tablet Take 1 tablet (40 mg total) by mouth once daily.    divalproex (DEPAKOTE) 250 MG EC tablet 1 pill in morning, 1 pill in evening (Patient taking differently: Take 500 mg by mouth 2 (two) times daily.)    fenofibrate 160 MG Tab Take 160 mg by mouth once daily.    gabapentin (NEURONTIN) 300 MG capsule Take 2 capsules (600 mg total) by mouth once daily.    hydrOXYzine pamoate (VISTARIL) 25 MG Cap 1 pill 3x a day for 2 days, rest left over every 8 hours as needed for migraine (Patient taking differently: Take 25 mg by mouth once daily. 1 pill 3x a day for 2 days, rest left over every 8 hours as needed for migraine)    irbesartan (AVAPRO) 300 MG tablet Take 300 mg by mouth once daily.    isosorbide mononitrate (IMDUR) 60 MG 24 hr tablet Take 60 mg by mouth every morning.    lidocaine HCl 3 % Crea APPLY 2 grams to the painful area THREE TIMES  A DAY    meloxicam (MOBIC) 7.5 MG tablet Take 7.5 mg by mouth 2 (two) times daily.     metoprolol succinate (TOPROL-XL) 25 MG 24 hr tablet Take 25 mg by mouth 2 (two) times daily.     midodrine (PROAMATINE) 2.5 MG Tab Take 2.5 mg by mouth 2 (two) times daily with meals.     mirabegron (MYRBETRIQ) 50 mg Tb24 Take 1 tablet (50 mg total) by mouth once daily.    mupirocin (BACTROBAN) 2 % ointment 2 (two) times daily.    nitroGLYCERIN (NITROSTAT) 0.4 MG SL tablet Place 0.4 mg under the tongue every 5 (five) minutes as needed for Chest pain.    NON FORMULARY MEDICATION Apply topically 4 (four) times daily as needed. Gabapentin 5%, Orphenadrine 2%, Lidocaine 2%, Prilocaine 2%    omega-3 acid ethyl esters (LOVAZA) 1 gram capsule Take 2 capsules by mouth 2 (two) times daily.    oxycodone-acetaminophen (PERCOCET)  mg per tablet Take 1 tablet by mouth every 4 (four) hours as needed for Pain.    pravastatin (PRAVACHOL) 80 MG tablet Take 80 mg by mouth once daily.    RANEXA 1,000 mg Tb12 Take 1,000 mg by mouth 2 (two) times daily.    RESTASIS 0.05 % ophthalmic emulsion Place 1 drop into both eyes 2 (two) times daily.    tizanidine (ZANAFLEX) 4 MG tablet ONE TABLET BY MOUTH IN THE EVENING    traZODone (DESYREL) 150 MG tablet TAKE 1 TABLET BY MOUTH AT BEDTIME as needed for sleep    XARELTO 15 mg Tab ONE TABLET BY MOUTH TWICE DAILY FOR 21 DAYS    ALPRAZolam (XANAX) 0.5 MG tablet Take 1 tablet (0.5 mg total) by mouth daily as needed for Anxiety.    hydrOXYzine pamoate (VISTARIL) 25 MG Cap Take 1 capsule (25 mg total) by mouth 3 (three) times daily as needed.    oxybutynin (DITROPAN-XL) 10 MG 24 hr tablet Take 1 tablet (10 mg total) by mouth 2 (two) times a day.    tamsulosin (FLOMAX) 0.4 mg Cap Take 2 capsules (0.8 mg total) by mouth once daily.     No current facility-administered medications for this visit.       Review of Systems   Constitutional: Negative for activity change, fatigue, fever and  "unexpected weight change.   HENT: Negative for congestion.    Eyes: Negative for redness.   Respiratory: Negative for chest tightness and shortness of breath.    Cardiovascular: Negative for chest pain and leg swelling.   Gastrointestinal: Negative for abdominal pain, constipation, diarrhea, nausea and vomiting.   Genitourinary: Positive for testicular pain and urgency. Negative for dysuria, flank pain, frequency, hematuria, penile pain, penile swelling and scrotal swelling.   Musculoskeletal: Negative for arthralgias and back pain.   Neurological: Negative for dizziness and light-headedness.   Psychiatric/Behavioral: Negative for behavioral problems and confusion. The patient is not nervous/anxious.    All other systems reviewed and are negative.      Objective:      Vitals:    01/28/22 1343   Resp: 18   Weight: 104.6 kg (230 lb 9.6 oz)   Height: 5' 9" (1.753 m)     Physical Exam  Vitals and nursing note reviewed.   Constitutional:       Appearance: He is well-developed and well-nourished.   HENT:      Head: Normocephalic.   Eyes:      Conjunctiva/sclera: Conjunctivae normal.   Neck:      Thyroid: No thyromegaly.      Trachea: No tracheal deviation.   Cardiovascular:      Rate and Rhythm: Normal rate.      Heart sounds: Normal heart sounds.   Pulmonary:      Effort: Pulmonary effort is normal. No respiratory distress.      Breath sounds: Normal breath sounds. No wheezing.   Abdominal:      General: Bowel sounds are normal.      Palpations: Abdomen is soft. There is no hepatosplenomegaly.      Tenderness: There is no abdominal tenderness. There is no CVA tenderness or rebound.      Hernia: No hernia is present.   Musculoskeletal:         General: No tenderness or edema. Normal range of motion.      Cervical back: Normal range of motion and neck supple.   Lymphadenopathy:      Cervical: No cervical adenopathy.   Skin:     General: Skin is warm and dry.      Findings: No erythema or rash.   Neurological:      Mental " Status: He is alert and oriented to person, place, and time.   Psychiatric:         Mood and Affect: Mood and affect normal.         Behavior: Behavior normal.         Thought Content: Thought content normal.         Judgment: Judgment normal.         Urine dipstick shows negative for all components.  Micro exam: negative for WBC's or RBC's.    9/2021  [facility] Scrotal US  Good blood flow, bilaterally  Right 4.6 cm spermatocele    Assessment:       1. Pain in both testicles    2. Urinary urgency    3. BPH without obstruction/lower urinary tract symptoms          Plan:       1. Urinary urgency    - oxybutynin (DITROPAN-XL) 10 MG 24 hr tablet; Take 1 tablet (10 mg total) by mouth 2 (two) times a day.  Dispense: 180 tablet; Refill: 3  - POCT urinalysis, dipstick or tablet reag    2. BPH without obstruction/lower urinary tract symptoms    - tamsulosin (FLOMAX) 0.4 mg Cap; Take 2 capsules (0.8 mg total) by mouth once daily.  Dispense: 180 capsule; Refill: 3    3. Pain in both testicles    - US Scrotum And Testicles; Future  - US Scrotum And Testicles     Follow up in about 3 months (around 4/28/2022).

## 2022-02-10 ENCOUNTER — TELEPHONE (OUTPATIENT)
Dept: UROLOGY | Facility: CLINIC | Age: 62
End: 2022-02-10
Payer: MEDICARE

## 2022-03-04 ENCOUNTER — TELEPHONE (OUTPATIENT)
Dept: UROLOGY | Facility: CLINIC | Age: 62
End: 2022-03-04
Payer: MEDICARE

## 2022-03-04 DIAGNOSIS — N50.811 PAIN IN BOTH TESTICLES: Primary | ICD-10-CM

## 2022-03-04 DIAGNOSIS — N50.812 PAIN IN BOTH TESTICLES: Primary | ICD-10-CM

## 2022-03-04 NOTE — TELEPHONE ENCOUNTER
I have never heard of this issue.  There is not a study for a doppler of the scrotum.  A standard ultrasound of the scrotum includes doppler images.  It cannot be ordered separately.

## 2022-03-04 NOTE — TELEPHONE ENCOUNTER
----- Message from Caridad Delgado MA sent at 3/4/2022  3:24 PM CST -----  Regarding: FW: Maryann from [facility]/ [facility] needs to be added to US order for patient  Please advise   ----- Message -----  From: Shirlene Street  Sent: 3/4/2022   9:39 AM CST  To: Emeka Hamlin Staff  [facility]    Type: Patient Call Back    Who called:Maryann from [facility]     What is the request in detail: Maryann from [facility] called to notify Dr. Hendrickson that they have the order for the patient's ultrasound of the scrotum, however something is missing. They need to have the doppler added, due to insurance purposes. If possible, they would like the order re-faxed, but with the doppler added to: [phone]    Can the clinic reply by [facility]?no     Would the patient rather a call back or a response via [facility]? Call back     Best call back number:[phone]

## 2022-03-10 ENCOUNTER — TELEPHONE (OUTPATIENT)
Dept: UROLOGY | Facility: CLINIC | Age: 62
End: 2022-03-10
Payer: MEDICARE

## 2022-03-10 NOTE — TELEPHONE ENCOUNTER
Spoke with pt, advised I would fax orders to [facility] at [phone], confirmed this fax with [facility] staff.

## 2022-03-10 NOTE — TELEPHONE ENCOUNTER
----- Message from Brenna Turner MA sent at 3/10/2022  9:59 AM CST -----  Regarding: FW: self [phone] Patient has called multiple times    ----- Message -----  From: Sandie Wheat  Sent: 3/4/2022  10:34 AM CST  To: Theresa AYOUB Staff  Subject: self [phone] Patient has called [facility]    Type: Patient Call Back    Who called: self    What is the request in detail: Orders to be sent to [facility] and they have called in to speak to the nurse and wants to know what's going on. No one calls him back, he stated he has called about 3-4 times. For his ultrasound of the scrotum.     Can the clinic reply by [facility]? no    Would the patient rather a call back or a response via [facility]?  Call back    Best call back number: [phone]

## 2022-03-30 ENCOUNTER — TELEPHONE (OUTPATIENT)
Dept: PSYCHIATRY | Facility: CLINIC | Age: 62
End: 2022-03-30
Payer: MEDICARE

## 2022-03-30 RX ORDER — BUSPIRONE HYDROCHLORIDE 15 MG/1
15 TABLET ORAL 3 TIMES DAILY
Qty: 270 TABLET | Refills: 1 | Status: SHIPPED | OUTPATIENT
Start: 2022-03-30 | End: 2022-07-06 | Stop reason: SDUPTHER

## 2022-03-30 RX ORDER — TRAZODONE HYDROCHLORIDE 150 MG/1
TABLET ORAL
Qty: 90 TABLET | Refills: 1 | Status: SHIPPED | OUTPATIENT
Start: 2022-03-30 | End: 2022-09-09

## 2022-03-30 RX ORDER — CITALOPRAM 40 MG/1
40 TABLET, FILM COATED ORAL DAILY
Qty: 90 TABLET | Refills: 1 | Status: SHIPPED | OUTPATIENT
Start: 2022-03-30 | End: 2022-07-06 | Stop reason: SDUPTHER

## 2022-03-30 NOTE — TELEPHONE ENCOUNTER
Pt. Did not check in for virtual appointment today at 10 am. At 10:05 am I called the patient at [phone][indicated as the patient's main telephone number with a star], however there was no answer.  Pt. Is now upset, saying that I called the wrong number. He now has an appointment to see me scheduled for July. I told the patient of the option of him checking in the meantime, if there have been any cancellations.

## 2022-04-29 ENCOUNTER — OFFICE VISIT (OUTPATIENT)
Dept: UROLOGY | Facility: CLINIC | Age: 62
End: 2022-04-29
Payer: MEDICARE

## 2022-04-29 VITALS — WEIGHT: 227.5 LBS | BODY MASS INDEX: 33.6 KG/M2

## 2022-04-29 DIAGNOSIS — N50.811 PAIN IN BOTH TESTICLES: ICD-10-CM

## 2022-04-29 DIAGNOSIS — R39.15 URINARY URGENCY: ICD-10-CM

## 2022-04-29 DIAGNOSIS — N50.812 PAIN IN BOTH TESTICLES: ICD-10-CM

## 2022-04-29 DIAGNOSIS — N40.0 BPH WITHOUT OBSTRUCTION/LOWER URINARY TRACT SYMPTOMS: Primary | ICD-10-CM

## 2022-04-29 PROCEDURE — 99999 PR PBB SHADOW E&M-EST. PATIENT-LVL II: CPT | Mod: PBBFAC,,, | Performed by: UROLOGY

## 2022-04-29 PROCEDURE — 99212 OFFICE O/P EST SF 10 MIN: CPT | Mod: PBBFAC | Performed by: UROLOGY

## 2022-04-29 PROCEDURE — 99213 PR OFFICE/OUTPT VISIT, EST, LEVL III, 20-29 MIN: ICD-10-PCS | Mod: S$PBB,,, | Performed by: UROLOGY

## 2022-04-29 PROCEDURE — 99213 OFFICE O/P EST LOW 20 MIN: CPT | Mod: S$PBB,,, | Performed by: UROLOGY

## 2022-04-29 PROCEDURE — 99999 PR PBB SHADOW E&M-EST. PATIENT-LVL II: ICD-10-PCS | Mod: PBBFAC,,, | Performed by: UROLOGY

## 2022-04-29 RX ORDER — TAMSULOSIN HYDROCHLORIDE 0.4 MG/1
0.8 CAPSULE ORAL DAILY
Qty: 180 CAPSULE | Refills: 3 | Status: SHIPPED | OUTPATIENT
Start: 2022-04-29 | End: 2022-10-28 | Stop reason: SDUPTHER

## 2022-04-29 RX ORDER — OXYBUTYNIN CHLORIDE 10 MG/1
10 TABLET, EXTENDED RELEASE ORAL 2 TIMES DAILY
Qty: 180 TABLET | Refills: 3 | Status: SHIPPED | OUTPATIENT
Start: 2022-04-29 | End: 2022-09-09 | Stop reason: SDUPTHER

## 2022-04-29 NOTE — PROGRESS NOTES
Subjective:       Patient ID: Min Nunez is a 61 y.o. male The patient's last visit with me was on 1/28/2022.     Chief Complaint:   Chief Complaint   Patient presents with    Follow-up       Orchalgia  He had a bilateral epididymectomy on 8/5/2016.  He continues to have right sided swelling and bilateral pain.  His right sided pain is worse than left.   He complains of pain with sitting, standing, getting up to stand, after roly movements, lifting, and walking.  He has difficulty walking or performing exercises.    10/20/2021   He continues to have pain, and it seems worse recently.   He denies any hematuria, dysuria, or trauma.      1/28/2022  His pain is about the same.  He is trying to avoid heavy lifting.     04/29/2022  He is about the same.  He had a heart attack recently.  He had an angioplasty.    Urinary frequency and Urgency Incontinence  He has some frequency and urgency that is managed with Ditropan XL 10 mg and Myrbetriq 25 mg and Flomax.  [employer] is no longer covered.      1/28/2022  IPSS Questionnaire (AUA-7):18/4 04/29/2022  stable          ACTIVE MEDICAL ISSUES:  Patient Active Problem List   Diagnosis    Chest pain, exertional    Coronary artery disease, occlusive    Coronary atherosclerosis    Orchalgia    Dizziness    Syncope    Abnormal brain MRI    Migraines    Atypical migraine    HA (headache)    Numbness    Facial droop    Faintness    Groin pain    Constipation    Hypotension    Urinary urgency    Nocturia more than twice per night    Pneumonia of right lower lobe due to infectious organism    Hypertension    Mixed hyperlipidemia    Morbid obesity    Anxiety       ALLERGIES AND MEDICATIONS: updated and reviewed.  Review of patient's allergies indicates:  No Known Allergies  Current Outpatient Medications   Medication Sig    albuterol (PROAIR HFA) 90 mcg/actuation inhaler Inhale 2 puffs into the lungs every 6 (six) hours as needed for Wheezing.     busPIRone (BUSPAR) 15 MG tablet Take 1 tablet (15 mg total) by mouth 3 (three) times daily.    chlorhexidine (PERIDEX) 0.12 % solution swish AND SPIT FIFTEEN MILLILITERS BY MOUTH TWICE DAILY    citalopram (CELEXA) 40 MG tablet Take 1 tablet (40 mg total) by mouth once daily.    divalproex (DEPAKOTE) 250 MG EC tablet 1 pill in morning, 1 pill in evening (Patient taking differently: Take 500 mg by mouth 2 (two) times daily.)    fenofibrate 160 MG Tab Take 160 mg by mouth once daily.    gabapentin (NEURONTIN) 300 MG capsule Take 2 capsules (600 mg total) by mouth once daily.    hydrOXYzine pamoate (VISTARIL) 25 MG Cap 1 pill 3x a day for 2 days, rest left over every 8 hours as needed for migraine (Patient taking differently: Take 25 mg by mouth once daily. 1 pill 3x a day for 2 days, rest left over every 8 hours as needed for migraine)    irbesartan (AVAPRO) 300 MG tablet Take 300 mg by mouth once daily.    isosorbide mononitrate (IMDUR) 60 MG 24 hr tablet Take 60 mg by mouth every morning.    lidocaine HCl 3 % Crea APPLY 2 grams to the painful area THREE TIMES A DAY    meloxicam (MOBIC) 7.5 MG tablet Take 7.5 mg by mouth 2 (two) times daily.     metoprolol succinate (TOPROL-XL) 25 MG 24 hr tablet Take 25 mg by mouth 2 (two) times daily.     midodrine (PROAMATINE) 2.5 MG Tab Take 2.5 mg by mouth 2 (two) times daily with meals.     mirabegron (MYRBETRIQ) 50 mg Tb24 Take 1 tablet (50 mg total) by mouth once daily.    mupirocin (BACTROBAN) 2 % ointment 2 (two) times daily.    nitroGLYCERIN (NITROSTAT) 0.4 MG SL tablet Place 0.4 mg under the tongue every 5 (five) minutes as needed for Chest pain.    NON FORMULARY MEDICATION Apply topically 4 (four) times daily as needed. Gabapentin 5%, Orphenadrine 2%, Lidocaine 2%, Prilocaine 2%    omega-3 acid ethyl esters (LOVAZA) 1 gram capsule Take 2 capsules by mouth 2 (two) times daily.    oxybutynin (DITROPAN-XL) 10 MG 24 hr tablet Take 1 tablet (10 mg total) by  mouth 2 (two) times a day.    oxycodone-acetaminophen (PERCOCET)  mg per tablet Take 1 tablet by mouth every 4 (four) hours as needed for Pain.    pravastatin (PRAVACHOL) 80 MG tablet Take 80 mg by mouth once daily.    RANEXA 1,000 mg Tb12 Take 1,000 mg by mouth 2 (two) times daily.    RESTASIS 0.05 % ophthalmic emulsion Place 1 drop into both eyes 2 (two) times daily.    tamsulosin (FLOMAX) 0.4 mg Cap Take 2 capsules (0.8 mg total) by mouth once daily.    tizanidine (ZANAFLEX) 4 MG tablet ONE TABLET BY MOUTH IN THE EVENING    traZODone (DESYREL) 150 MG tablet TAKE 1 TABLET BY MOUTH AT BEDTIME as needed for sleep    XARELTO 15 mg Tab ONE TABLET BY MOUTH TWICE DAILY FOR 21 DAYS    ALPRAZolam (XANAX) 0.5 MG tablet Take 1 tablet (0.5 mg total) by mouth daily as needed for Anxiety.    hydrOXYzine pamoate (VISTARIL) 25 MG Cap Take 1 capsule (25 mg total) by mouth 3 (three) times daily as needed.     No current facility-administered medications for this visit.       Review of Systems   Constitutional: Negative for activity change, fatigue, fever and unexpected weight change.   HENT: Negative for congestion.    Eyes: Negative for redness.   Respiratory: Negative for chest tightness and shortness of breath.    Cardiovascular: Negative for chest pain and leg swelling.   Gastrointestinal: Negative for abdominal pain, constipation, diarrhea, nausea and vomiting.   Genitourinary: Positive for testicular pain and urgency. Negative for dysuria, flank pain, frequency, hematuria, penile pain, penile swelling and scrotal swelling.   Musculoskeletal: Negative for arthralgias and back pain.   Neurological: Negative for dizziness and light-headedness.   Psychiatric/Behavioral: Negative for behavioral problems and confusion. The patient is not nervous/anxious.    All other systems reviewed and are negative.      Objective:      Vitals:    04/29/22 1301   Weight: 103.2 kg (227 lb 8.2 oz)     Physical Exam  Vitals and  nursing note reviewed.   Constitutional:       Appearance: He is well-developed.   HENT:      Head: Normocephalic.   Eyes:      Conjunctiva/sclera: Conjunctivae normal.   Neck:      Thyroid: No thyromegaly.      Trachea: No tracheal deviation.   Cardiovascular:      Rate and Rhythm: Normal rate.      Heart sounds: Normal heart sounds.   Pulmonary:      Effort: Pulmonary effort is normal. No respiratory distress.      Breath sounds: Normal breath sounds. No wheezing.   Abdominal:      General: Bowel sounds are normal.      Palpations: Abdomen is soft.      Tenderness: There is no abdominal tenderness. There is no rebound.      Hernia: No hernia is present.   Musculoskeletal:         General: No tenderness. Normal range of motion.      Cervical back: Normal range of motion and neck supple.   Lymphadenopathy:      Cervical: No cervical adenopathy.   Skin:     General: Skin is warm and dry.      Findings: No erythema or rash.   Neurological:      Mental Status: He is alert and oriented to person, place, and time.   Psychiatric:         Behavior: Behavior normal.         Thought Content: Thought content normal.         Judgment: Judgment normal.         Urine dipstick shows negative for all components.  Micro exam: negative for WBC's or RBC's.    9/2021  [facility] Scrotal US  Good blood flow, bilaterally  Right 4.6 cm spermatocele    4/19/2022  Good blood flow bilaterally  Right 4.7 cm spermatocele    Assessment:       1. BPH without obstruction/lower urinary tract symptoms    2. Pain in both testicles    3. Urinary urgency          Plan:       1. Pain in both testicles  stable    2. Urinary urgency  Oxybutynin    - POCT urinalysis, dipstick or tablet reag    3. BPH without obstruction/lower urinary tract symptoms    - Prostate Specific Antigen, Diagnostic; Future     Follow up in about 3 months (around 7/29/2022).

## 2022-07-06 ENCOUNTER — OFFICE VISIT (OUTPATIENT)
Dept: PSYCHIATRY | Facility: CLINIC | Age: 62
End: 2022-07-06
Payer: MEDICARE

## 2022-07-06 DIAGNOSIS — F41.9 ANXIETY: Primary | ICD-10-CM

## 2022-07-06 PROCEDURE — 99214 OFFICE O/P EST MOD 30 MIN: CPT | Mod: 95,,, | Performed by: PSYCHIATRY & NEUROLOGY

## 2022-07-06 PROCEDURE — 99214 PR OFFICE/OUTPT VISIT, EST, LEVL IV, 30-39 MIN: ICD-10-PCS | Mod: 95,,, | Performed by: PSYCHIATRY & NEUROLOGY

## 2022-07-06 RX ORDER — CITALOPRAM 40 MG/1
40 TABLET, FILM COATED ORAL DAILY
Qty: 90 TABLET | Refills: 1 | Status: SHIPPED | OUTPATIENT
Start: 2022-07-06 | End: 2022-09-09

## 2022-07-06 RX ORDER — BUSPIRONE HYDROCHLORIDE 15 MG/1
15 TABLET ORAL 3 TIMES DAILY
Qty: 270 TABLET | Refills: 1 | Status: SHIPPED | OUTPATIENT
Start: 2022-07-06 | End: 2022-09-09

## 2022-07-06 NOTE — PROGRESS NOTES
The patient location is: [place], [place]  The chief complaint leading to consultation is: anxiety/depression    Visit type: audiovisual    Face to Face time with patient: 15 min  15 minutes of total time spent on the encounter, which includes face to face time and non-face to face time preparing to see the patient (eg, review of tests), Obtaining and/or reviewing separately obtained history, Documenting clinical information in the electronic or other health record, Independently interpreting results (not separately reported) and communicating results to the patient/family/caregiver, or Care coordination (not separately reported).     Each patient to whom he or she provides medical services by telemedicine is:  (1) informed of the relationship between the physician and patient and the respective role of any other health care provider with respect to management of the patient; and (2) notified that he or she may decline to receive medical services by telemedicine and may withdraw from such care at any time.    Notes:     ESTABLISHED OUTPATIENT VISIT   E/M LEVEL 4: [id number]    ENCOUNTER DATE: 7/6/2022  SITE: [facility], [address]    HISTORY    CHIEF COMPLAINT   Min Nunez is a 61 y.o. male who presents for follow up of anxiety/depression.    HPI    Continues to struggle with multiple physical issues. Physical pain is troublesome.    Overall appears stable psychiatrically.    Satisfied with current psychotropic medication regimen.    Watches TV.    Psychiatric Review Of Systems - Is patient experiencing or having changes in:  sleep: varies  appetite: no  weight: no  energy/anergy: no  interest/pleasure/anhedonia: no  somatic symptoms: no  libido: no  anxiety/panic: anxiety at times  guilty/hopelessness: no  concentration: no  S.I.B.s/risky behavior: no  Irritability: no  Racing thoughts: no  Impulsive behaviors: no  Paranoia:no  AVH:no    Recent alcohol: no  Recent drug: no    Medical ROS   Multiple  physical problems     PAST MEDICAL, FAMILY AND SOCIAL HISTORY: The patient's past medical, family and social history have been reviewed and updated as appropriate within the electronic medical record - see encounter notes.    PSYCHOTROPIC MEDICATIONS   Trazodone  mg at bedtime prn, Buspar 15 mg tid, Celexa 40 mg qam, Depakote 500 mg bid[for headaches], Hydroxyzine prn for migraines[usually takes 25 mg tid]    Scheduled and PRN Medications     Current Outpatient Medications:     albuterol (PROAIR HFA) 90 mcg/actuation inhaler, Inhale 2 puffs into the lungs every 6 (six) hours as needed for Wheezing., Disp: 18 g, Rfl: 0    ALPRAZolam (XANAX) 0.5 MG tablet, Take 1 tablet (0.5 mg total) by mouth daily as needed for Anxiety., Disp: 30 tablet, Rfl: 3    busPIRone (BUSPAR) 15 MG tablet, Take 1 tablet (15 mg total) by mouth 3 (three) times daily., Disp: 270 tablet, Rfl: 1    chlorhexidine (PERIDEX) 0.12 % solution, swish AND SPIT FIFTEEN MILLILITERS BY MOUTH TWICE DAILY, Disp: , Rfl: 0    citalopram (CELEXA) 40 MG tablet, Take 1 tablet (40 mg total) by mouth once daily., Disp: 90 tablet, Rfl: 1    divalproex (DEPAKOTE) 250 MG EC tablet, 1 pill in morning, 1 pill in evening (Patient taking differently: Take 500 mg by mouth 2 (two) times daily.), Disp: 60 tablet, Rfl: 6    fenofibrate 160 MG Tab, Take 160 mg by mouth once daily., Disp: , Rfl:     gabapentin (NEURONTIN) 300 MG capsule, Take 2 capsules (600 mg total) by mouth once daily., Disp: 180 capsule, Rfl: 11    hydrOXYzine pamoate (VISTARIL) 25 MG Cap, 1 pill 3x a day for 2 days, rest left over every 8 hours as needed for migraine (Patient taking differently: Take 25 mg by mouth once daily. 1 pill 3x a day for 2 days, rest left over every 8 hours as needed for migraine), Disp: 15 capsule, Rfl: 3    hydrOXYzine pamoate (VISTARIL) 25 MG Cap, Take 1 capsule (25 mg total) by mouth 3 (three) times daily as needed., Disp: 30 capsule, Rfl: 3    irbesartan  (AVAPRO) 300 MG tablet, Take 300 mg by mouth once daily., Disp: , Rfl:     isosorbide mononitrate (IMDUR) 60 MG 24 hr tablet, Take 60 mg by mouth every morning., Disp: , Rfl: 3    lidocaine HCl 3 % Crea, APPLY 2 grams to the painful area THREE TIMES A DAY, Disp: , Rfl: 3    meloxicam (MOBIC) 7.5 MG tablet, Take 7.5 mg by mouth 2 (two) times daily. , Disp: , Rfl:     metoprolol succinate (TOPROL-XL) 25 MG 24 hr tablet, Take 25 mg by mouth 2 (two) times daily. , Disp: , Rfl:     midodrine (PROAMATINE) 2.5 MG Tab, Take 2.5 mg by mouth 2 (two) times daily with meals. , Disp: , Rfl:     mirabegron (MYRBETRIQ) 50 mg Tb24, Take 1 tablet (50 mg total) by mouth once daily., Disp: 30 tablet, Rfl: 11    mupirocin (BACTROBAN) 2 % ointment, 2 (two) times daily., Disp: , Rfl: 0    nitroGLYCERIN (NITROSTAT) 0.4 MG SL tablet, Place 0.4 mg under the tongue every 5 (five) minutes as needed for Chest pain., Disp: , Rfl:     NON FORMULARY MEDICATION, Apply topically 4 (four) times daily as needed. Gabapentin 5%, Orphenadrine 2%, Lidocaine 2%, Prilocaine 2%, Disp: , Rfl:     omega-3 acid ethyl esters (LOVAZA) 1 gram capsule, Take 2 capsules by mouth 2 (two) times daily., Disp: , Rfl: 3    oxybutynin (DITROPAN-XL) 10 MG 24 hr tablet, Take 1 tablet (10 mg total) by mouth 2 (two) times a day., Disp: 180 tablet, Rfl: 3    oxycodone-acetaminophen (PERCOCET)  mg per tablet, Take 1 tablet by mouth every 4 (four) hours as needed for Pain., Disp: 30 tablet, Rfl: 0    pravastatin (PRAVACHOL) 80 MG tablet, Take 80 mg by mouth once daily., Disp: , Rfl: 4    RANEXA 1,000 mg Tb12, Take 1,000 mg by mouth 2 (two) times daily., Disp: , Rfl: 3    RESTASIS 0.05 % ophthalmic emulsion, Place 1 drop into both eyes 2 (two) times daily., Disp: , Rfl: 4    tamsulosin (FLOMAX) 0.4 mg Cap, Take 2 capsules (0.8 mg total) by mouth once daily., Disp: 180 capsule, Rfl: 3    tizanidine (ZANAFLEX) 4 MG tablet, ONE TABLET BY MOUTH IN THE EVENING,  Disp: , Rfl: 2    traZODone (DESYREL) 150 MG tablet, TAKE 1 TABLET BY MOUTH AT BEDTIME as needed for sleep, Disp: 90 tablet, Rfl: 1    XARELTO 15 mg Tab, ONE TABLET BY MOUTH TWICE DAILY FOR 21 DAYS, Disp: , Rfl: 0    EXAMINATION    There were no vitals filed for this visit.    CONSTITUTIONAL  General Appearance: well nourished    MUSCULOSKELETAL  Muscle Strength and Tone: normal strength and tone  Abnormal Involuntary Movements: no abnormal movement noted  Gait and Station: normal gait    PSYCHIATRIC   Level of Consciousness: alert  Orientation: oriented to person, place and time  Grooming: well groomed  Psychomotor Behavior: no restlessness/agitation  Speech: normal in rate, rhythm and volume  Language: normal vocabulary  Mood: anxious at times  Affect: full range and appropriate  Thought Process: logical and goal directed  Associations: intact associations  Thought Content: no SI/HI  Memory: grossly intact  Attention: intact to content of interview  Fund of Knowledge: appears adequate  Insight: good  Judgement: good    MEDICAL DECISION MAKING    DIAGNOSES  Anxiety d/o, unspecified    PROBLEM LIST AND MANAGEMENT PLANS  - anxiety:   Continue Celexa and Buspar as above  Try to minimize use of Trazodone due to reported recent low BP  - rtc 3 months     Time with patient: 15 min    LABORATORY DATA  No visits with results within 3 Month(s) from this visit.   Latest known visit with results is:   Office Visit on 10/20/2021   Component Date Value Ref Range Status    Color, UA 10/20/2021 Yellow   Final    pH, UA 10/20/2021 6   Final    WBC, UA 10/20/2021 neg   Final    Nitrite, UA 10/20/2021 neg   Final    Protein, POC 10/20/2021 neg   Final    Glucose, UA 10/20/2021 1,000   Final    Ketones, UA 10/20/2021 neg   Final    Urobilinogen, UA 10/20/2021 neg   Final    Bilirubin, POC 10/20/2021 neg   Final    Blood, UA 10/20/2021 neg   Final    Clarity, UA 10/20/2021 Clear   Final    Spec Grav UA 10/20/2021 1,010    Final           Sampson Luke

## 2022-07-27 ENCOUNTER — LAB VISIT (OUTPATIENT)
Dept: LAB | Facility: HOSPITAL | Age: 62
End: 2022-07-27
Attending: UROLOGY
Payer: MEDICARE

## 2022-07-27 DIAGNOSIS — N40.0 BPH WITHOUT OBSTRUCTION/LOWER URINARY TRACT SYMPTOMS: ICD-10-CM

## 2022-07-27 LAB — COMPLEXED PSA SERPL-MCNC: 0.42 NG/ML (ref 0–4)

## 2022-07-27 PROCEDURE — 84153 ASSAY OF PSA TOTAL: CPT | Performed by: UROLOGY

## 2022-07-27 PROCEDURE — 36415 COLL VENOUS BLD VENIPUNCTURE: CPT | Mod: PO | Performed by: UROLOGY

## 2022-07-28 ENCOUNTER — PATIENT MESSAGE (OUTPATIENT)
Dept: PSYCHIATRY | Facility: CLINIC | Age: 62
End: 2022-07-28
Payer: MEDICARE

## 2022-07-29 ENCOUNTER — OFFICE VISIT (OUTPATIENT)
Dept: UROLOGY | Facility: CLINIC | Age: 62
End: 2022-07-29
Payer: MEDICARE

## 2022-07-29 VITALS — BODY MASS INDEX: 33.07 KG/M2 | WEIGHT: 223.31 LBS | HEIGHT: 69 IN

## 2022-07-29 DIAGNOSIS — R39.15 URINARY URGENCY: Primary | ICD-10-CM

## 2022-07-29 DIAGNOSIS — R35.0 URINARY FREQUENCY: ICD-10-CM

## 2022-07-29 DIAGNOSIS — N40.0 BPH WITHOUT OBSTRUCTION/LOWER URINARY TRACT SYMPTOMS: ICD-10-CM

## 2022-07-29 DIAGNOSIS — N50.811 PAIN IN BOTH TESTICLES: ICD-10-CM

## 2022-07-29 DIAGNOSIS — N50.812 PAIN IN BOTH TESTICLES: ICD-10-CM

## 2022-07-29 LAB
BILIRUB SERPL-MCNC: NORMAL MG/DL
BLOOD URINE, POC: NORMAL
COLOR, POC UA: YELLOW
GLUCOSE UR QL STRIP: 1000
KETONES UR QL STRIP: NORMAL
LEUKOCYTE ESTERASE URINE, POC: NORMAL
NITRITE, POC UA: NORMAL
PH, POC UA: 6
PROTEIN, POC: NORMAL
SPECIFIC GRAVITY, POC UA: 1015
UROBILINOGEN, POC UA: NORMAL

## 2022-07-29 PROCEDURE — 99214 PR OFFICE/OUTPT VISIT, EST, LEVL IV, 30-39 MIN: ICD-10-PCS | Mod: S$PBB,,, | Performed by: UROLOGY

## 2022-07-29 PROCEDURE — 81001 URINALYSIS AUTO W/SCOPE: CPT | Mod: PBBFAC | Performed by: UROLOGY

## 2022-07-29 PROCEDURE — 99999 PR PBB SHADOW E&M-EST. PATIENT-LVL V: ICD-10-PCS | Mod: PBBFAC,,, | Performed by: UROLOGY

## 2022-07-29 PROCEDURE — 99999 PR PBB SHADOW E&M-EST. PATIENT-LVL V: CPT | Mod: PBBFAC,,, | Performed by: UROLOGY

## 2022-07-29 PROCEDURE — 99214 OFFICE O/P EST MOD 30 MIN: CPT | Mod: S$PBB,,, | Performed by: UROLOGY

## 2022-07-29 PROCEDURE — 99215 OFFICE O/P EST HI 40 MIN: CPT | Mod: PBBFAC | Performed by: UROLOGY

## 2022-07-29 NOTE — PROGRESS NOTES
Subjective:       Patient ID: Min Nunez is a 61 y.o. male The patient's last visit with me was on 1/28/2022.     Chief Complaint:   Chief Complaint   Patient presents with    Follow-up       Orchalgia  He had a bilateral epididymectomy on 8/5/2016.  He continues to have right sided swelling and bilateral pain.  His right sided pain is worse than left.   He complains of pain with sitting, standing, getting up to stand, after roly movements, lifting, and walking.  He has difficulty walking or performing exercises.    10/20/2021   He continues to have pain, and it seems worse recently.   He denies any hematuria, dysuria, or trauma.      1/28/2022  His pain is about the same.  He is trying to avoid heavy lifting.     07/29/2022  He is about the same.  He had a heart attack recently.  He had an angioplasty.    Urinary frequency and Urgency Incontinence  He has some frequency and urgency that is managed with Ditropan XL 10 mg and Myrbetriq 25 mg and Flomax.  [employer] is no longer covered.      1/28/2022  IPSS Questionnaire (AUA-7):18/4 07/29/2022  stable    IPSS Questionnaire (AUA-7):  Over the past month    1)  How often have you had a sensation of not emptying your bladder completely after you finish urinating?  3 - About half the time   2)  How often have you had to urinate again less than two hours after you finished urinating? 3 - About half the time   3)  How often have you found you stopped and started again several times when you urinated?  3 - About half the time   4) How difficult have you found it to postpone urination?  2 - Less than half the time   5) How often have you had a weak urinary stream?  2 - Less than half the time   6) How often have you had to push or strain to begin urination?  2 - Less than half the time   7) How many times did you most typically get up to urinate from the time you went to bed until the time you got up in the morning?  4 - 4 times   Total score:  0-7 mildly  symptomatic    8-19 moderately symptomatic  19/5    20-35 severely symptomatic            ACTIVE MEDICAL ISSUES:  Patient Active Problem List   Diagnosis    Chest pain, exertional    Coronary artery disease, occlusive    Coronary atherosclerosis    Orchalgia    Dizziness    Syncope    Abnormal brain MRI    Migraines    Atypical migraine    HA (headache)    Numbness    Facial droop    Faintness    Groin pain    Constipation    Hypotension    Urinary urgency    Nocturia more than twice per night    Pneumonia of right lower lobe due to infectious organism    Hypertension    Mixed hyperlipidemia    Morbid obesity    Anxiety       ALLERGIES AND MEDICATIONS: updated and reviewed.  Review of patient's allergies indicates:  No Known Allergies  Current Outpatient Medications   Medication Sig    albuterol (PROAIR HFA) 90 mcg/actuation inhaler Inhale 2 puffs into the lungs every 6 (six) hours as needed for Wheezing.    busPIRone (BUSPAR) 15 MG tablet Take 1 tablet (15 mg total) by mouth 3 (three) times daily.    chlorhexidine (PERIDEX) 0.12 % solution swish AND SPIT FIFTEEN MILLILITERS BY MOUTH TWICE DAILY    citalopram (CELEXA) 40 MG tablet Take 1 tablet (40 mg total) by mouth once daily.    divalproex (DEPAKOTE) 250 MG EC tablet 1 pill in morning, 1 pill in evening (Patient taking differently: Take 500 mg by mouth 2 (two) times daily.)    fenofibrate 160 MG Tab Take 160 mg by mouth once daily.    gabapentin (NEURONTIN) 300 MG capsule Take 2 capsules (600 mg total) by mouth once daily.    hydrOXYzine pamoate (VISTARIL) 25 MG Cap 1 pill 3x a day for 2 days, rest left over every 8 hours as needed for migraine (Patient taking differently: Take 25 mg by mouth once daily. 1 pill 3x a day for 2 days, rest left over every 8 hours as needed for migraine)    irbesartan (AVAPRO) 300 MG tablet Take 300 mg by mouth once daily.    isosorbide mononitrate (IMDUR) 60 MG 24 hr tablet Take 60 mg by mouth every  morning.    lidocaine HCl 3 % Crea APPLY 2 grams to the painful area THREE TIMES A DAY    meloxicam (MOBIC) 7.5 MG tablet Take 7.5 mg by mouth 2 (two) times daily.     metoprolol succinate (TOPROL-XL) 25 MG 24 hr tablet Take 25 mg by mouth 2 (two) times daily.     midodrine (PROAMATINE) 2.5 MG Tab Take 2.5 mg by mouth 2 (two) times daily with meals.     mirabegron (MYRBETRIQ) 50 mg Tb24 Take 1 tablet (50 mg total) by mouth once daily.    mupirocin (BACTROBAN) 2 % ointment 2 (two) times daily.    nitroGLYCERIN (NITROSTAT) 0.4 MG SL tablet Place 0.4 mg under the tongue every 5 (five) minutes as needed for Chest pain.    NON FORMULARY MEDICATION Apply topically 4 (four) times daily as needed. Gabapentin 5%, Orphenadrine 2%, Lidocaine 2%, Prilocaine 2%    omega-3 acid ethyl esters (LOVAZA) 1 gram capsule Take 2 capsules by mouth 2 (two) times daily.    oxybutynin (DITROPAN-XL) 10 MG 24 hr tablet Take 1 tablet (10 mg total) by mouth 2 (two) times a day.    oxycodone-acetaminophen (PERCOCET)  mg per tablet Take 1 tablet by mouth every 4 (four) hours as needed for Pain.    pravastatin (PRAVACHOL) 80 MG tablet Take 80 mg by mouth once daily.    RANEXA 1,000 mg Tb12 Take 1,000 mg by mouth 2 (two) times daily.    RESTASIS 0.05 % ophthalmic emulsion Place 1 drop into both eyes 2 (two) times daily.    tamsulosin (FLOMAX) 0.4 mg Cap Take 2 capsules (0.8 mg total) by mouth once daily.    tizanidine (ZANAFLEX) 4 MG tablet ONE TABLET BY MOUTH IN THE EVENING    traZODone (DESYREL) 150 MG tablet TAKE 1 TABLET BY MOUTH AT BEDTIME as needed for sleep    XARELTO 15 mg Tab ONE TABLET BY MOUTH TWICE DAILY FOR 21 DAYS    ALPRAZolam (XANAX) 0.5 MG tablet Take 1 tablet (0.5 mg total) by mouth daily as needed for Anxiety.    hydrOXYzine pamoate (VISTARIL) 25 MG Cap Take 1 capsule (25 mg total) by mouth 3 (three) times daily as needed.     No current facility-administered medications for this visit.       Review of  "Systems   Constitutional: Negative for activity change, fatigue, fever and unexpected weight change.   HENT: Negative for congestion.    Eyes: Negative for redness.   Respiratory: Negative for chest tightness and shortness of breath.    Cardiovascular: Negative for chest pain and leg swelling.   Gastrointestinal: Negative for abdominal pain, constipation, diarrhea, nausea and vomiting.   Genitourinary: Positive for testicular pain and urgency. Negative for dysuria, flank pain, frequency, hematuria, penile pain, penile swelling and scrotal swelling.   Musculoskeletal: Negative for arthralgias and back pain.   Neurological: Negative for dizziness and light-headedness.   Psychiatric/Behavioral: Negative for behavioral problems and confusion. The patient is not nervous/anxious.    All other systems reviewed and are negative.      Objective:      Vitals:    07/29/22 1305   Weight: 101.3 kg (223 lb 5.2 oz)   Height: 5' 9" (1.753 m)     Physical Exam  Vitals and nursing note reviewed.   Constitutional:       Appearance: He is well-developed.   HENT:      Head: Normocephalic.   Eyes:      Conjunctiva/sclera: Conjunctivae normal.   Neck:      Thyroid: No thyromegaly.      Trachea: No tracheal deviation.   Cardiovascular:      Rate and Rhythm: Normal rate.      Heart sounds: Normal heart sounds.   Pulmonary:      Effort: Pulmonary effort is normal. No respiratory distress.      Breath sounds: Normal breath sounds. No wheezing.   Abdominal:      General: Bowel sounds are normal.      Palpations: Abdomen is soft.      Tenderness: There is no abdominal tenderness. There is no rebound.      Hernia: No hernia is present.   Musculoskeletal:         General: No tenderness. Normal range of motion.      Cervical back: Normal range of motion and neck supple.   Lymphadenopathy:      Cervical: No cervical adenopathy.   Skin:     General: Skin is warm and dry.      Findings: No erythema or rash.   Neurological:      Mental Status: He is " alert and oriented to person, place, and time.   Psychiatric:         Behavior: Behavior normal.         Thought Content: Thought content normal.         Judgment: Judgment normal.         Urine dipstick shows negative for all components.  Micro exam: negative for WBC's or RBC's.    9/2021  [facility] Scrotal US  Good blood flow, bilaterally  Right 4.6 cm spermatocele    4/19/2022  Good blood flow bilaterally  Right 4.7 cm spermatocele    Component Ref Range & Units 2 d ago 1 yr ago 2 yr ago 3 yr ago 5 yr ago 7 yr ago   PSA Diagnostic 0.00 - 4.00 ng/mL 0.42  0.39 CM  0.43 CM  0.33 CM  0.44 CM  0.35 CM    Comment: The testing method is a chemiluminescent microparticle immunoassay   manufactured by [employer] and performed on the [employer]   or   [employer] system. Values obtained with different assay manufacturers   for   methods may be different and cannot be used interchangeably.   PSA Expected levels:   Hormonal Therapy: <0.05 ng/ml   Prostatectomy: <0.01 ng/ml   Radiation Therapy: <1.00 ng/ml    Resulting Agency  [facility]             Narrative  Performed by: [facility]  PSA 3 months      Specimen Collected: 07/27/22 11:13 Last Resulted: 07/27/22 19:19               Assessment:       1. Urinary urgency    2. Urinary frequency    3. BPH without obstruction/lower urinary tract symptoms    4. Pain in both testicles          Plan:       1. Urinary urgency  Oxybutynin  - POCT urinalysis, dipstick or tablet reag    2. Urinary frequency  Myrbetriq  Consider Botox    3. BPH without obstruction/lower urinary tract symptoms  Flomax    4. Pain in both testicles  Pain clinic    - US Scrotum And Testicles; Future  - US Scrotum And Testicles     Follow up in about 13 weeks (around 10/28/2022).

## 2022-09-09 DIAGNOSIS — R39.15 URINARY URGENCY: ICD-10-CM

## 2022-09-09 RX ORDER — OXYBUTYNIN CHLORIDE 10 MG/1
10 TABLET, EXTENDED RELEASE ORAL 2 TIMES DAILY
Qty: 180 TABLET | Refills: 3 | Status: SHIPPED | OUTPATIENT
Start: 2022-09-09 | End: 2022-10-28 | Stop reason: SDUPTHER

## 2022-10-19 ENCOUNTER — OFFICE VISIT (OUTPATIENT)
Dept: PSYCHIATRY | Facility: CLINIC | Age: 62
End: 2022-10-19
Payer: MEDICARE

## 2022-10-19 VITALS
BODY MASS INDEX: 33.61 KG/M2 | DIASTOLIC BLOOD PRESSURE: 69 MMHG | SYSTOLIC BLOOD PRESSURE: 120 MMHG | HEART RATE: 82 BPM | WEIGHT: 227.63 LBS

## 2022-10-19 DIAGNOSIS — F41.9 ANXIETY: Primary | ICD-10-CM

## 2022-10-19 PROCEDURE — 99213 PR OFFICE/OUTPT VISIT, EST, LEVL III, 20-29 MIN: ICD-10-PCS | Mod: S$PBB,,, | Performed by: NURSE PRACTITIONER

## 2022-10-19 PROCEDURE — 90833 PSYTX W PT W E/M 30 MIN: CPT | Mod: ,,, | Performed by: NURSE PRACTITIONER

## 2022-10-19 PROCEDURE — 99999 PR PBB SHADOW E&M-EST. PATIENT-LVL IV: CPT | Mod: PBBFAC,,, | Performed by: NURSE PRACTITIONER

## 2022-10-19 PROCEDURE — 90833 PR PSYCHOTHERAPY W/PATIENT W/E&M, 30 MIN (ADD ON): ICD-10-PCS | Mod: ,,, | Performed by: NURSE PRACTITIONER

## 2022-10-19 PROCEDURE — 99214 OFFICE O/P EST MOD 30 MIN: CPT | Mod: PBBFAC | Performed by: NURSE PRACTITIONER

## 2022-10-19 PROCEDURE — 99213 OFFICE O/P EST LOW 20 MIN: CPT | Mod: S$PBB,,, | Performed by: NURSE PRACTITIONER

## 2022-10-19 PROCEDURE — 99999 PR PBB SHADOW E&M-EST. PATIENT-LVL IV: ICD-10-PCS | Mod: PBBFAC,,, | Performed by: NURSE PRACTITIONER

## 2022-10-19 NOTE — PATIENT INSTRUCTIONS
Continue current medications - per Dr. Luke. Pt has sufficient refills to last until his next appt on 11/30/22    Continue Celexa and Buspar as above
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
## 2022-10-28 ENCOUNTER — OFFICE VISIT (OUTPATIENT)
Dept: UROLOGY | Facility: CLINIC | Age: 62
End: 2022-10-28
Payer: MEDICARE

## 2022-10-28 VITALS — BODY MASS INDEX: 33.78 KG/M2 | WEIGHT: 228.06 LBS | HEIGHT: 69 IN

## 2022-10-28 DIAGNOSIS — N50.811 PAIN IN BOTH TESTICLES: ICD-10-CM

## 2022-10-28 DIAGNOSIS — N40.0 BPH WITHOUT OBSTRUCTION/LOWER URINARY TRACT SYMPTOMS: ICD-10-CM

## 2022-10-28 DIAGNOSIS — N48.89 PENILE IRRITATION: ICD-10-CM

## 2022-10-28 DIAGNOSIS — R39.15 URINARY URGENCY: Primary | ICD-10-CM

## 2022-10-28 DIAGNOSIS — N50.812 PAIN IN BOTH TESTICLES: ICD-10-CM

## 2022-10-28 PROCEDURE — 81001 URINALYSIS AUTO W/SCOPE: CPT | Mod: PBBFAC | Performed by: UROLOGY

## 2022-10-28 PROCEDURE — 99214 OFFICE O/P EST MOD 30 MIN: CPT | Mod: PBBFAC | Performed by: UROLOGY

## 2022-10-28 PROCEDURE — 99999 PR PBB SHADOW E&M-EST. PATIENT-LVL IV: CPT | Mod: PBBFAC,,, | Performed by: UROLOGY

## 2022-10-28 PROCEDURE — 99214 PR OFFICE/OUTPT VISIT, EST, LEVL IV, 30-39 MIN: ICD-10-PCS | Mod: S$PBB,,, | Performed by: UROLOGY

## 2022-10-28 PROCEDURE — 99999 PR PBB SHADOW E&M-EST. PATIENT-LVL IV: ICD-10-PCS | Mod: PBBFAC,,, | Performed by: UROLOGY

## 2022-10-28 PROCEDURE — 99214 OFFICE O/P EST MOD 30 MIN: CPT | Mod: S$PBB,,, | Performed by: UROLOGY

## 2022-10-28 RX ORDER — TAMSULOSIN HYDROCHLORIDE 0.4 MG/1
0.8 CAPSULE ORAL DAILY
Qty: 180 CAPSULE | Refills: 3 | Status: SHIPPED | OUTPATIENT
Start: 2022-10-28 | End: 2023-03-15 | Stop reason: SDUPTHER

## 2022-10-28 RX ORDER — OXYBUTYNIN CHLORIDE 10 MG/1
10 TABLET, EXTENDED RELEASE ORAL 2 TIMES DAILY
Qty: 180 TABLET | Refills: 3 | Status: SHIPPED | OUTPATIENT
Start: 2022-10-28 | End: 2023-03-15 | Stop reason: SDUPTHER

## 2022-10-28 RX ORDER — CLOTRIMAZOLE AND BETAMETHASONE DIPROPIONATE 10; .64 MG/G; MG/G
CREAM TOPICAL 2 TIMES DAILY
Qty: 45 G | Refills: 1 | Status: SHIPPED | OUTPATIENT
Start: 2022-10-28

## 2022-10-28 NOTE — PROGRESS NOTES
Subjective:       Patient ID: Min Nunez is a 61 y.o. male The patient's last visit with me was on 7/29/2022.     Chief Complaint:   Chief Complaint   Patient presents with    Follow-up         Orchalgia  He had a bilateral epididymectomy on 8/5/2016.  He continues to have right sided swelling and bilateral pain.  His right sided pain is worse than left.   He complains of pain with sitting, standing, getting up to stand, after roly movements, lifting, and walking.  He has difficulty walking or performing exercises.    10/20/2021   He continues to have pain, and it seems worse recently.   He denies any hematuria, dysuria, or trauma.      1/28/2022  His pain is about the same.  He is trying to avoid heavy lifting.     7/29/2022  He is about the same.  He had a heart attack recently.  He had an angioplasty.    10/28/2022  His pain is about the same.  No new issues.    Urinary frequency and Urgency Incontinence  He has some frequency and urgency that is managed with Ditropan XL 10 mg and Myrbetriq 25 mg and Flomax.  [employer] is no longer covered.      1/28/2022  IPSS Questionnaire (AUA-7):18/4    7/29/2022  IPSS Questionnaire (AUA-7):  19/5    10/28/2022  He is doing okay with Oxybutynin XL 10 mg BID, and Flomax BID.  He has noted some penile irritation after intercourse, he has been using his wife's estradiol cream.    IPSS Questionnaire (AUA-7):  Over the past month    1)  How often have you had a sensation of not emptying your bladder completely after you finish urinating?  2 - Less than half the time   2)  How often have you had to urinate again less than two hours after you finished urinating? 2 - Less than half the time   3)  How often have you found you stopped and started again several times when you urinated?  3 - About half the time   4) How difficult have you found it to postpone urination?  3 - About half the time   5) How often have you had a weak urinary stream?  3 - About half the time   6) How  often have you had to push or strain to begin urination?  0 - Not at all   7) How many times did you most typically get up to urinate from the time you went to bed until the time you got up in the morning?  4 - 4 times   Total score:  0-7 mildly symptomatic    8-19 moderately symptomatic  17/3    20-35 severely symptomatic            ACTIVE MEDICAL ISSUES:  Patient Active Problem List   Diagnosis    Chest pain, exertional    Coronary artery disease, occlusive    Coronary atherosclerosis    Orchalgia    Dizziness    Syncope    Abnormal brain MRI    Migraines    Atypical migraine    HA (headache)    Numbness    Facial droop    Faintness    Groin pain    Constipation    Hypotension    Urinary urgency    Nocturia more than twice per night    Pneumonia of right lower lobe due to infectious organism    Hypertension    Mixed hyperlipidemia    Morbid obesity    Anxiety       ALLERGIES AND MEDICATIONS: updated and reviewed.  Review of patient's allergies indicates:  No Known Allergies  Current Outpatient Medications   Medication Sig    albuterol (PROAIR HFA) 90 mcg/actuation inhaler Inhale 2 puffs into the lungs every 6 (six) hours as needed for Wheezing.    busPIRone (BUSPAR) 15 MG tablet TAKE 1 TABLET(15 MG) BY MOUTH THREE TIMES DAILY    chlorhexidine (PERIDEX) 0.12 % solution swish AND SPIT FIFTEEN MILLILITERS BY MOUTH TWICE DAILY    citalopram (CELEXA) 40 MG tablet TAKE 1 TABLET(40 MG) BY MOUTH EVERY DAY    divalproex (DEPAKOTE) 250 MG EC tablet 1 pill in morning, 1 pill in evening (Patient taking differently: Take 500 mg by mouth 2 (two) times daily.)    fenofibrate 160 MG Tab Take 160 mg by mouth once daily.    gabapentin (NEURONTIN) 300 MG capsule Take 2 capsules (600 mg total) by mouth once daily.    hydrOXYzine pamoate (VISTARIL) 25 MG Cap 1 pill 3x a day for 2 days, rest left over every 8 hours as needed for migraine (Patient taking differently: Take 25 mg by mouth once daily. 1 pill 3x a day for 2 days, rest left  over every 8 hours as needed for migraine)    lidocaine HCl 3 % Crea APPLY 2 grams to the painful area THREE TIMES A DAY    metoprolol succinate (TOPROL-XL) 25 MG 24 hr tablet Take 25 mg by mouth 2 (two) times daily.     midodrine (PROAMATINE) 2.5 MG Tab Take 2.5 mg by mouth 2 (two) times daily with meals.     mupirocin (BACTROBAN) 2 % ointment 2 (two) times daily.    nitroGLYCERIN (NITROSTAT) 0.4 MG SL tablet Place 0.4 mg under the tongue every 5 (five) minutes as needed for Chest pain.    NON FORMULARY MEDICATION Apply topically 4 (four) times daily as needed. Gabapentin 5%, Orphenadrine 2%, Lidocaine 2%, Prilocaine 2%    omega-3 acid ethyl esters (LOVAZA) 1 gram capsule Take 2 capsules by mouth 2 (two) times daily.    oxycodone-acetaminophen (PERCOCET)  mg per tablet Take 1 tablet by mouth every 4 (four) hours as needed for Pain.    pravastatin (PRAVACHOL) 80 MG tablet Take 80 mg by mouth once daily.    RANEXA 1,000 mg Tb12 Take 1,000 mg by mouth 2 (two) times daily.    RESTASIS 0.05 % ophthalmic emulsion Place 1 drop into both eyes 2 (two) times daily.    tizanidine (ZANAFLEX) 4 MG tablet ONE TABLET BY MOUTH IN THE EVENING    traZODone (DESYREL) 150 MG tablet TAKE 1 TABLET BY MOUTH AT BEDTIME AS NEEDED FOR SLEEP    XARELTO 15 mg Tab ONE TABLET BY MOUTH TWICE DAILY FOR 21 DAYS    clotrimazole-betamethasone 1-0.05% (LOTRISONE) cream Apply topically 2 (two) times daily.    hydrOXYzine pamoate (VISTARIL) 25 MG Cap Take 1 capsule (25 mg total) by mouth 3 (three) times daily as needed.    oxybutynin (DITROPAN-XL) 10 MG 24 hr tablet Take 1 tablet (10 mg total) by mouth 2 (two) times a day.    tamsulosin (FLOMAX) 0.4 mg Cap Take 2 capsules (0.8 mg total) by mouth once daily.     No current facility-administered medications for this visit.       Review of Systems   Constitutional:  Negative for activity change, fatigue, fever and unexpected weight change.   HENT:  Negative for congestion.    Eyes:  Negative for  "redness.   Respiratory:  Negative for chest tightness and shortness of breath.    Cardiovascular:  Negative for chest pain and leg swelling.   Gastrointestinal:  Negative for abdominal pain, constipation, diarrhea, nausea and vomiting.   Genitourinary:  Positive for testicular pain and urgency. Negative for dysuria, flank pain, frequency, hematuria, penile pain, penile swelling and scrotal swelling.   Musculoskeletal:  Negative for arthralgias and back pain.   Neurological:  Negative for dizziness and light-headedness.   Psychiatric/Behavioral:  Negative for behavioral problems and confusion. The patient is not nervous/anxious.    All other systems reviewed and are negative.    Objective:      Vitals:    10/28/22 1311   Weight: 103.4 kg (228 lb 1.1 oz)   Height: 5' 9" (1.753 m)       Physical Exam  Vitals and nursing note reviewed.   Constitutional:       Appearance: He is well-developed.   HENT:      Head: Normocephalic.   Eyes:      Conjunctiva/sclera: Conjunctivae normal.   Neck:      Thyroid: No thyromegaly.      Trachea: No tracheal deviation.   Cardiovascular:      Rate and Rhythm: Normal rate.      Heart sounds: Normal heart sounds.   Pulmonary:      Effort: Pulmonary effort is normal. No respiratory distress.      Breath sounds: Normal breath sounds. No wheezing.   Abdominal:      General: Bowel sounds are normal.      Palpations: Abdomen is soft.      Tenderness: There is no abdominal tenderness. There is no rebound.      Hernia: No hernia is present.   Musculoskeletal:         General: No tenderness. Normal range of motion.      Cervical back: Normal range of motion and neck supple.   Lymphadenopathy:      Cervical: No cervical adenopathy.   Skin:     General: Skin is warm and dry.      Findings: No erythema or rash.   Neurological:      Mental Status: He is alert and oriented to person, place, and time.   Psychiatric:         Behavior: Behavior normal.         Thought Content: Thought content normal.    "      Judgment: Judgment normal.       Urine dipstick shows negative for all components.  Micro exam: negative for WBC's or RBC's.    9/2021  DIS Scrotal US  Good blood flow, bilaterally  Right 4.6 cm spermatocele    4/19/2022  Good blood flow bilaterally  Right 4.7 cm spermatocele        Assessment:       1. Urinary urgency    2. BPH without obstruction/lower urinary tract symptoms    3. Penile irritation    4. Pain in both testicles            Plan:       1. Urinary urgency    - oxybutynin (DITROPAN-XL) 10 MG 24 hr tablet; Take 1 tablet (10 mg total) by mouth 2 (two) times a day.  Dispense: 180 tablet; Refill: 3    2. BPH without obstruction/lower urinary tract symptoms    - tamsulosin (FLOMAX) 0.4 mg Cap; Take 2 capsules (0.8 mg total) by mouth once daily.  Dispense: 180 capsule; Refill: 3    3. Penile irritation    - clotrimazole-betamethasone 1-0.05% (LOTRISONE) cream; Apply topically 2 (two) times daily.  Dispense: 45 g; Refill: 1    4. Pain in both testicles  Plan on US in April 2023     Follow up in about 3 months (around 1/28/2023) for Follow up.

## 2022-10-31 LAB
BILIRUB SERPL-MCNC: NEGATIVE MG/DL
BLOOD URINE, POC: NEGATIVE
COLOR, POC UA: YELLOW
GLUCOSE UR QL STRIP: 250
KETONES UR QL STRIP: NEGATIVE
LEUKOCYTE ESTERASE URINE, POC: NEGATIVE
NITRITE, POC UA: NEGATIVE
PH, POC UA: 6
PROTEIN, POC: NEGATIVE
SPECIFIC GRAVITY, POC UA: 1.02
UROBILINOGEN, POC UA: NORMAL

## 2022-11-30 ENCOUNTER — OFFICE VISIT (OUTPATIENT)
Dept: PSYCHIATRY | Facility: CLINIC | Age: 62
End: 2022-11-30
Payer: MEDICARE

## 2022-11-30 VITALS
BODY MASS INDEX: 33.13 KG/M2 | WEIGHT: 224.31 LBS | SYSTOLIC BLOOD PRESSURE: 162 MMHG | DIASTOLIC BLOOD PRESSURE: 96 MMHG | HEART RATE: 77 BPM

## 2022-11-30 DIAGNOSIS — F41.9 ANXIETY: Primary | ICD-10-CM

## 2022-11-30 PROCEDURE — 99211 OFF/OP EST MAY X REQ PHY/QHP: CPT | Mod: PBBFAC | Performed by: PSYCHIATRY & NEUROLOGY

## 2022-11-30 PROCEDURE — 99214 OFFICE O/P EST MOD 30 MIN: CPT | Mod: S$PBB,,, | Performed by: PSYCHIATRY & NEUROLOGY

## 2022-11-30 PROCEDURE — 99214 PR OFFICE/OUTPT VISIT, EST, LEVL IV, 30-39 MIN: ICD-10-PCS | Mod: S$PBB,,, | Performed by: PSYCHIATRY & NEUROLOGY

## 2022-11-30 PROCEDURE — 99999 PR PBB SHADOW E&M-EST. PATIENT-LVL I: CPT | Mod: PBBFAC,,, | Performed by: PSYCHIATRY & NEUROLOGY

## 2022-11-30 PROCEDURE — 99999 PR PBB SHADOW E&M-EST. PATIENT-LVL I: ICD-10-PCS | Mod: PBBFAC,,, | Performed by: PSYCHIATRY & NEUROLOGY

## 2022-11-30 RX ORDER — HYDROXYZINE HYDROCHLORIDE 25 MG/1
25 TABLET, FILM COATED ORAL 3 TIMES DAILY PRN
Qty: 90 TABLET | Refills: 3 | Status: SHIPPED | OUTPATIENT
Start: 2022-11-30 | End: 2023-05-02 | Stop reason: SDUPTHER

## 2022-11-30 NOTE — PROGRESS NOTES
ESTABLISHED OUTPATIENT VISIT   E/M LEVEL 4: 23726    ENCOUNTER DATE: 11/30/2022  SITE: [facility], [address]    HISTORY    CHIEF COMPLAINT   Min Nunez is a 61 y.o. male who presents for follow up of anxiety/depression.    HPI    Continues to struggle with multiple physical issues. Physical pain is troublesome.    Overall appears stable psychiatrically.    Satisfied with current psychotropic medication regimen.    Watches TV.    Stress related to wife and son.    Psychiatric Review Of Systems - Is patient experiencing or having changes in:  sleep: varies  appetite: no  weight: no  energy/anergy: no  interest/pleasure/anhedonia: no  somatic symptoms: no  libido: no  anxiety/panic: anxiety at times  guilty/hopelessness: no  concentration: no  S.I.B.s/risky behavior: no  Irritability: no  Racing thoughts: no  Impulsive behaviors: no  Paranoia:no  AVH:no    Recent alcohol: no  Recent drug: no    Medical ROS   Multiple physical problems     PAST MEDICAL, FAMILY AND SOCIAL HISTORY: The patient's past medical, family and social history have been reviewed and updated as appropriate within the electronic medical record - see encounter notes.    PSYCHOTROPIC MEDICATIONS   Trazodone  mg at bedtime prn, Buspar 15 mg tid, Celexa 40 mg qam, Depakote 500 mg bid[for headaches], Hydroxyzine prn for migraines[usually takes 25 mg tid]    Scheduled and PRN Medications     Current Outpatient Medications:     albuterol (PROAIR HFA) 90 mcg/actuation inhaler, Inhale 2 puffs into the lungs every 6 (six) hours as needed for Wheezing., Disp: 18 g, Rfl: 0    busPIRone (BUSPAR) 15 MG tablet, TAKE 1 TABLET(15 MG) BY MOUTH THREE TIMES DAILY, Disp: 270 tablet, Rfl: 1    chlorhexidine (PERIDEX) 0.12 % solution, swish AND SPIT FIFTEEN MILLILITERS BY MOUTH TWICE DAILY, Disp: , Rfl: 0    citalopram (CELEXA) 40 MG tablet, TAKE 1 TABLET(40 MG) BY MOUTH EVERY DAY, Disp: 90 tablet, Rfl: 1    clotrimazole-betamethasone 1-0.05%  (LOTRISONE) cream, Apply topically 2 (two) times daily., Disp: 45 g, Rfl: 1    divalproex (DEPAKOTE) 250 MG EC tablet, 1 pill in morning, 1 pill in evening (Patient taking differently: Take 500 mg by mouth 2 (two) times daily.), Disp: 60 tablet, Rfl: 6    fenofibrate 160 MG Tab, Take 160 mg by mouth once daily., Disp: , Rfl:     gabapentin (NEURONTIN) 300 MG capsule, Take 2 capsules (600 mg total) by mouth once daily., Disp: 180 capsule, Rfl: 11    hydrOXYzine pamoate (VISTARIL) 25 MG Cap, 1 pill 3x a day for 2 days, rest left over every 8 hours as needed for migraine (Patient taking differently: Take 25 mg by mouth once daily. 1 pill 3x a day for 2 days, rest left over every 8 hours as needed for migraine), Disp: 15 capsule, Rfl: 3    hydrOXYzine pamoate (VISTARIL) 25 MG Cap, Take 1 capsule (25 mg total) by mouth 3 (three) times daily as needed., Disp: 30 capsule, Rfl: 3    lidocaine HCl 3 % Crea, APPLY 2 grams to the painful area THREE TIMES A DAY, Disp: , Rfl: 3    metoprolol succinate (TOPROL-XL) 25 MG 24 hr tablet, Take 25 mg by mouth 2 (two) times daily. , Disp: , Rfl:     midodrine (PROAMATINE) 2.5 MG Tab, Take 2.5 mg by mouth 2 (two) times daily with meals. , Disp: , Rfl:     mupirocin (BACTROBAN) 2 % ointment, 2 (two) times daily., Disp: , Rfl: 0    nitroGLYCERIN (NITROSTAT) 0.4 MG SL tablet, Place 0.4 mg under the tongue every 5 (five) minutes as needed for Chest pain., Disp: , Rfl:     NON FORMULARY MEDICATION, Apply topically 4 (four) times daily as needed. Gabapentin 5%, Orphenadrine 2%, Lidocaine 2%, Prilocaine 2%, Disp: , Rfl:     omega-3 acid ethyl esters (LOVAZA) 1 gram capsule, Take 2 capsules by mouth 2 (two) times daily., Disp: , Rfl: 3    oxybutynin (DITROPAN-XL) 10 MG 24 hr tablet, Take 1 tablet (10 mg total) by mouth 2 (two) times a day., Disp: 180 tablet, Rfl: 3    oxycodone-acetaminophen (PERCOCET)  mg per tablet, Take 1 tablet by mouth every 4 (four) hours as needed for Pain., Disp:  30 tablet, Rfl: 0    pravastatin (PRAVACHOL) 80 MG tablet, Take 80 mg by mouth once daily., Disp: , Rfl: 4    RANEXA 1,000 mg Tb12, Take 1,000 mg by mouth 2 (two) times daily., Disp: , Rfl: 3    RESTASIS 0.05 % ophthalmic emulsion, Place 1 drop into both eyes 2 (two) times daily., Disp: , Rfl: 4    tamsulosin (FLOMAX) 0.4 mg Cap, Take 2 capsules (0.8 mg total) by mouth once daily., Disp: 180 capsule, Rfl: 3    tizanidine (ZANAFLEX) 4 MG tablet, ONE TABLET BY MOUTH IN THE EVENING, Disp: , Rfl: 2    traZODone (DESYREL) 150 MG tablet, TAKE 1 TABLET BY MOUTH AT BEDTIME AS NEEDED FOR SLEEP, Disp: 90 tablet, Rfl: 1    XARELTO 15 mg Tab, ONE TABLET BY MOUTH TWICE DAILY FOR 21 DAYS, Disp: , Rfl: 0    EXAMINATION    Vitals:    11/30/22 1109   BP: (!) 162/96   Pulse: 77   Weight 224    Pt. States, that BP fluctuates.    CONSTITUTIONAL  General Appearance: well nourished    MUSCULOSKELETAL  Muscle Strength and Tone: normal strength and tone  Abnormal Involuntary Movements: no abnormal movement noted  Gait and Station: normal gait    PSYCHIATRIC   Level of Consciousness: alert  Orientation: oriented to person, place and time  Grooming: well groomed  Psychomotor Behavior: no restlessness/agitation  Speech: normal in rate, rhythm and volume  Language: normal vocabulary  Mood: anxious at times  Affect: full range and appropriate  Thought Process: logical and goal directed  Associations: intact associations  Thought Content: no SI/HI  Memory: grossly intact  Attention: intact to content of interview  Fund of Knowledge: appears adequate  Insight: good  Judgement: good    MEDICAL DECISION MAKING    DIAGNOSES  Anxiety d/o, unspecified    PROBLEM LIST AND MANAGEMENT PLANS  - anxiety:   Continue Celexa, Buspar, and Trazodone as above  - rtc 3 months     Time with patient: 20 min    LABORATORY DATA  Office Visit on 10/28/2022   Component Date Value Ref Range Status    Color, UA 10/31/2022 Yellow   Final    Spec Grav UA 10/31/2022 1.020    Final    pH, UA 10/31/2022 6   Final    WBC, UA 10/31/2022 Negative   Final    Nitrite, UA 10/31/2022 Negative   Final    Protein, POC 10/31/2022 Negative   Final    Glucose, UA 10/31/2022 250   Final    Ketones, UA 10/31/2022 Negative   Final    Urobilinogen, UA 10/31/2022 Normal   Final    Bilirubin, POC 10/31/2022 Negative   Final    Blood, UA 10/31/2022 Negative   Final           Sampson Luek

## 2023-03-15 ENCOUNTER — OFFICE VISIT (OUTPATIENT)
Dept: UROLOGY | Facility: CLINIC | Age: 63
End: 2023-03-15
Payer: MEDICARE

## 2023-03-15 VITALS — BODY MASS INDEX: 33.14 KG/M2 | WEIGHT: 224.44 LBS

## 2023-03-15 DIAGNOSIS — R39.15 URINARY URGENCY: Primary | ICD-10-CM

## 2023-03-15 DIAGNOSIS — N50.811 PAIN IN BOTH TESTICLES: ICD-10-CM

## 2023-03-15 DIAGNOSIS — N50.812 PAIN IN BOTH TESTICLES: ICD-10-CM

## 2023-03-15 DIAGNOSIS — N40.0 BPH WITHOUT OBSTRUCTION/LOWER URINARY TRACT SYMPTOMS: ICD-10-CM

## 2023-03-15 PROCEDURE — 99999 PR PBB SHADOW E&M-EST. PATIENT-LVL IV: ICD-10-PCS | Mod: PBBFAC,,, | Performed by: UROLOGY

## 2023-03-15 PROCEDURE — 99214 OFFICE O/P EST MOD 30 MIN: CPT | Mod: S$PBB,,, | Performed by: UROLOGY

## 2023-03-15 PROCEDURE — 99214 PR OFFICE/OUTPT VISIT, EST, LEVL IV, 30-39 MIN: ICD-10-PCS | Mod: S$PBB,,, | Performed by: UROLOGY

## 2023-03-15 PROCEDURE — 99999 PR PBB SHADOW E&M-EST. PATIENT-LVL IV: CPT | Mod: PBBFAC,,, | Performed by: UROLOGY

## 2023-03-15 PROCEDURE — 99214 OFFICE O/P EST MOD 30 MIN: CPT | Mod: PBBFAC | Performed by: UROLOGY

## 2023-03-15 PROCEDURE — 81001 URINALYSIS AUTO W/SCOPE: CPT | Mod: PBBFAC | Performed by: UROLOGY

## 2023-03-15 RX ORDER — OXYBUTYNIN CHLORIDE 10 MG/1
10 TABLET, EXTENDED RELEASE ORAL 2 TIMES DAILY
Qty: 180 TABLET | Refills: 3 | Status: SHIPPED | OUTPATIENT
Start: 2023-03-15 | End: 2023-06-16 | Stop reason: ALTCHOICE

## 2023-03-15 RX ORDER — TAMSULOSIN HYDROCHLORIDE 0.4 MG/1
0.8 CAPSULE ORAL DAILY
Qty: 180 CAPSULE | Refills: 3 | Status: SHIPPED | OUTPATIENT
Start: 2023-03-15 | End: 2023-12-18 | Stop reason: SDUPTHER

## 2023-03-15 NOTE — PROGRESS NOTES
Subjective:       Patient ID: Min Nunez is a 62 y.o. male The patient's last visit with me was on 10/28/2022.     Chief Complaint:   Chief Complaint   Patient presents with    Follow-up         Orchalgia  He had a bilateral epididymectomy on 8/5/2016.  He continues to have right sided swelling and bilateral pain.  His right sided pain is worse than left.   He complains of pain with sitting, standing, getting up to stand, after roly movements, lifting, and walking.  He has difficulty walking or performing exercises.    10/20/2021   He continues to have pain, and it seems worse recently.   He denies any hematuria, dysuria, or trauma.      1/28/2022  His pain is about the same.  He is trying to avoid heavy lifting.     7/29/2022  He is about the same.  He had a heart attack recently.  He had an angioplasty.    10/28/2022  His pain is about the same.  No new issues.    03/15/2023   The orchalgia is about the same.  He does not want any surgery.      Urinary frequency and Urgency Incontinence  He has some frequency and urgency that is managed with Ditropan XL 10 mg and Myrbetriq 25 mg and Flomax.  Vesicare is no longer covered.      1/28/2022  IPSS Questionnaire (AUA-7):18/4    7/29/2022  IPSS Questionnaire (AUA-7):  19/5    10/28/2022  He is doing okay with Oxybutynin XL 10 mg BID, and Flomax BID.  He has noted some penile irritation after intercourse, he has been using his wife's estradiol cream.    IPSS Questionnaire (AUA-7):  17/3    03/15/2023    He is taking Flomax and Oxybutynin.  Myrbetriq is too expensive.  IPSS Questionnaire (AUA-7):  Over the past month    1)  How often have you had a sensation of not emptying your bladder completely after you finish urinating?  3 - About half the time   2)  How often have you had to urinate again less than two hours after you finished urinating? 3 - About half the time   3)  How often have you found you stopped and started again several times when you urinated?  5 -  Almost always   4) How difficult have you found it to postpone urination?  3 - About half the time   5) How often have you had a weak urinary stream?  3 - About half the time   6) How often have you had to push or strain to begin urination?  3 - About half the time   7) How many times did you most typically get up to urinate from the time you went to bed until the time you got up in the morning?  4 - 4 times   Total score:  0-7 mildly symptomatic    8-19 moderately symptomatic    20-35 severely symptomatic  24/3        ACTIVE MEDICAL ISSUES:  Patient Active Problem List   Diagnosis    Chest pain, exertional    Coronary artery disease, occlusive    Coronary atherosclerosis    Orchalgia    Dizziness    Syncope    Abnormal brain MRI    Migraines    Atypical migraine    HA (headache)    Numbness    Facial droop    Faintness    Groin pain    Constipation    Hypotension    Urinary urgency    Nocturia more than twice per night    Pneumonia of right lower lobe due to infectious organism    Hypertension    Mixed hyperlipidemia    Morbid obesity    Anxiety       ALLERGIES AND MEDICATIONS: updated and reviewed.  Review of patient's allergies indicates:  No Known Allergies  Current Outpatient Medications   Medication Sig    albuterol (PROAIR HFA) 90 mcg/actuation inhaler Inhale 2 puffs into the lungs every 6 (six) hours as needed for Wheezing.    busPIRone (BUSPAR) 15 MG tablet TAKE 1 TABLET(15 MG) BY MOUTH THREE TIMES DAILY    chlorhexidine (PERIDEX) 0.12 % solution swish AND SPIT FIFTEEN MILLILITERS BY MOUTH TWICE DAILY    citalopram (CELEXA) 40 MG tablet TAKE 1 TABLET(40 MG) BY MOUTH EVERY DAY    clotrimazole-betamethasone 1-0.05% (LOTRISONE) cream Apply topically 2 (two) times daily.    divalproex (DEPAKOTE) 250 MG EC tablet 1 pill in morning, 1 pill in evening (Patient taking differently: Take 500 mg by mouth 2 (two) times daily.)    fenofibrate 160 MG Tab Take 160 mg by mouth once daily.    gabapentin (NEURONTIN) 300 MG  capsule Take 2 capsules (600 mg total) by mouth once daily.    hydrOXYzine HCL (ATARAX) 25 MG tablet Take 1 tablet (25 mg total) by mouth 3 (three) times daily as needed for Anxiety.    hydrOXYzine pamoate (VISTARIL) 25 MG Cap 1 pill 3x a day for 2 days, rest left over every 8 hours as needed for migraine (Patient taking differently: Take 25 mg by mouth once daily. 1 pill 3x a day for 2 days, rest left over every 8 hours as needed for migraine)    metoprolol succinate (TOPROL-XL) 25 MG 24 hr tablet Take 25 mg by mouth 2 (two) times daily.     midodrine (PROAMATINE) 2.5 MG Tab Take 2.5 mg by mouth 2 (two) times daily with meals.     mupirocin (BACTROBAN) 2 % ointment 2 (two) times daily.    nitroGLYCERIN (NITROSTAT) 0.4 MG SL tablet Place 0.4 mg under the tongue every 5 (five) minutes as needed for Chest pain.    NON FORMULARY MEDICATION Apply topically 4 (four) times daily as needed. Gabapentin 5%, Orphenadrine 2%, Lidocaine 2%, Prilocaine 2%    omega-3 acid ethyl esters (LOVAZA) 1 gram capsule Take 2 capsules by mouth 2 (two) times daily.    oxycodone-acetaminophen (PERCOCET)  mg per tablet Take 1 tablet by mouth every 4 (four) hours as needed for Pain.    pravastatin (PRAVACHOL) 80 MG tablet Take 80 mg by mouth once daily.    RANEXA 1,000 mg Tb12 Take 1,000 mg by mouth 2 (two) times daily.    RESTASIS 0.05 % ophthalmic emulsion Place 1 drop into both eyes 2 (two) times daily.    tizanidine (ZANAFLEX) 4 MG tablet ONE TABLET BY MOUTH IN THE EVENING    traZODone (DESYREL) 150 MG tablet TAKE 1 TABLET BY MOUTH AT BEDTIME AS NEEDED FOR SLEEP    XARELTO 15 mg Tab ONE TABLET BY MOUTH TWICE DAILY FOR 21 DAYS    hydrOXYzine pamoate (VISTARIL) 25 MG Cap Take 1 capsule (25 mg total) by mouth 3 (three) times daily as needed.    lidocaine HCl 3 % Crea APPLY 2 grams to the painful area THREE TIMES A DAY    oxybutynin (DITROPAN-XL) 10 MG 24 hr tablet Take 1 tablet (10 mg total) by mouth 2 (two) times a day.    tamsulosin  (FLOMAX) 0.4 mg Cap Take 2 capsules (0.8 mg total) by mouth once daily.     No current facility-administered medications for this visit.       Review of Systems   Constitutional:  Negative for activity change, fatigue, fever and unexpected weight change.   HENT:  Negative for congestion.    Eyes:  Negative for redness.   Respiratory:  Negative for chest tightness and shortness of breath.    Cardiovascular:  Negative for chest pain and leg swelling.   Gastrointestinal:  Negative for abdominal pain, constipation, diarrhea, nausea and vomiting.   Genitourinary:  Positive for testicular pain and urgency. Negative for dysuria, flank pain, frequency, hematuria, penile pain, penile swelling and scrotal swelling.   Musculoskeletal:  Negative for arthralgias and back pain.   Neurological:  Negative for dizziness and light-headedness.   Psychiatric/Behavioral:  Negative for behavioral problems and confusion. The patient is not nervous/anxious.    All other systems reviewed and are negative.    Objective:      Vitals:    03/15/23 1545   Weight: 101.8 kg (224 lb 6.9 oz)         Physical Exam  Vitals and nursing note reviewed.   Constitutional:       Appearance: He is well-developed.   HENT:      Head: Normocephalic.   Eyes:      Conjunctiva/sclera: Conjunctivae normal.   Neck:      Thyroid: No thyromegaly.      Trachea: No tracheal deviation.   Cardiovascular:      Rate and Rhythm: Normal rate.      Heart sounds: Normal heart sounds.   Pulmonary:      Effort: Pulmonary effort is normal. No respiratory distress.      Breath sounds: Normal breath sounds. No wheezing.   Abdominal:      General: Bowel sounds are normal.      Palpations: Abdomen is soft.      Tenderness: There is no abdominal tenderness. There is no rebound.      Hernia: No hernia is present.   Musculoskeletal:         General: No tenderness. Normal range of motion.      Cervical back: Normal range of motion and neck supple.   Lymphadenopathy:      Cervical: No  cervical adenopathy.   Skin:     General: Skin is warm and dry.      Findings: No erythema or rash.   Neurological:      Mental Status: He is alert and oriented to person, place, and time.   Psychiatric:         Behavior: Behavior normal.         Thought Content: Thought content normal.         Judgment: Judgment normal.       Urine dipstick shows negative for all components.  Micro exam: negative for WBC's or RBC's.    9/2021  [facility] Scrotal US  Good blood flow, bilaterally  Right 4.6 cm spermatocele    4/19/2022  Good blood flow bilaterally  Right 4.7 cm spermatocele        Assessment:       1. Urinary urgency    2. BPH without obstruction/lower urinary tract symptoms    3. Pain in both testicles              Plan:       1. Urinary urgency    - oxybutynin (DITROPAN-XL) 10 MG 24 hr tablet; Take 1 tablet (10 mg total) by mouth 2 (two) times a day.  Dispense: 180 tablet; Refill: 3    2. BPH without obstruction/lower urinary tract symptoms    - tamsulosin (FLOMAX) 0.4 mg Cap; Take 2 capsules (0.8 mg total) by mouth once daily.  Dispense: 180 capsule; Refill: 3    3. Pain in both testicles    - US Scrotum And Testicles; Future  - US Scrotum And Testicles      Follow up in about 3 months (around 6/15/2023) for Follow up Established, Review X-ray.

## 2023-03-17 ENCOUNTER — TELEPHONE (OUTPATIENT)
Dept: UROLOGY | Facility: HOSPITAL | Age: 63
End: 2023-03-17
Payer: MEDICARE

## 2023-03-17 DIAGNOSIS — N50.812 PAIN IN BOTH TESTICLES: Primary | ICD-10-CM

## 2023-03-17 DIAGNOSIS — N50.811 PAIN IN BOTH TESTICLES: Primary | ICD-10-CM

## 2023-03-17 NOTE — TELEPHONE ENCOUNTER
----- Message from Nhi Prado RN sent at 3/17/2023 10:22 AM CDT -----  Regarding: FW: Patient Call Back  [facility] is requesting another order for a doppler of testicles.  ----- Message -----  From: Martin Chang  Sent: 3/17/2023  10:09 AM CDT  To: [facility] Staff  Subject: Patient Call Back                                .Type: Patient Call Back    Who called: [facility]    What is the request in detail: Called to request another order to be sent over including a doppler of the testicles.    Can the clinic reply by [facility]? No     Would the patient rather a call back or a response via [facility]? Call back    Best call back number: [phone] Ext. [phone]    Additional Information:

## 2023-05-02 ENCOUNTER — OFFICE VISIT (OUTPATIENT)
Dept: PSYCHIATRY | Facility: CLINIC | Age: 63
End: 2023-05-02
Payer: MEDICARE

## 2023-05-02 VITALS
HEART RATE: 92 BPM | SYSTOLIC BLOOD PRESSURE: 121 MMHG | DIASTOLIC BLOOD PRESSURE: 67 MMHG | WEIGHT: 227.75 LBS | BODY MASS INDEX: 33.63 KG/M2

## 2023-05-02 DIAGNOSIS — F41.9 ANXIETY: Primary | ICD-10-CM

## 2023-05-02 PROCEDURE — 99213 PR OFFICE/OUTPT VISIT, EST, LEVL III, 20-29 MIN: ICD-10-PCS | Mod: S$PBB,,, | Performed by: PSYCHIATRY & NEUROLOGY

## 2023-05-02 PROCEDURE — 99999 PR PBB SHADOW E&M-EST. PATIENT-LVL I: CPT | Mod: PBBFAC,,, | Performed by: PSYCHIATRY & NEUROLOGY

## 2023-05-02 PROCEDURE — 99211 OFF/OP EST MAY X REQ PHY/QHP: CPT | Mod: PBBFAC | Performed by: PSYCHIATRY & NEUROLOGY

## 2023-05-02 PROCEDURE — 99213 OFFICE O/P EST LOW 20 MIN: CPT | Mod: S$PBB,,, | Performed by: PSYCHIATRY & NEUROLOGY

## 2023-05-02 PROCEDURE — 99999 PR PBB SHADOW E&M-EST. PATIENT-LVL I: ICD-10-PCS | Mod: PBBFAC,,, | Performed by: PSYCHIATRY & NEUROLOGY

## 2023-05-02 RX ORDER — HYDROXYZINE HYDROCHLORIDE 25 MG/1
25 TABLET, FILM COATED ORAL 3 TIMES DAILY PRN
Qty: 270 TABLET | Refills: 1 | Status: SHIPPED | OUTPATIENT
Start: 2023-05-02 | End: 2023-09-12 | Stop reason: SDUPTHER

## 2023-05-02 NOTE — PROGRESS NOTES
ESTABLISHED OUTPATIENT VISIT   E/M LEVEL 3: [id number]    ENCOUNTER DATE: 5/2/2023  SITE: [facility], [address]    HISTORY    CHIEF COMPLAINT   Min Nunez is a 62 y.o. male who presents for follow up of anxiety/depression.    HPI    Continues to struggle with multiple physical issues. Physical pain is troublesome.    Overall appears stable psychiatrically.    Satisfied with current psychotropic medication regimen.    Watches TV.    Stress related to wife.    Son in a group home house.    Psychiatric Review Of Systems - Is patient experiencing or having changes in:  sleep: varies  appetite: no  weight: no  energy/anergy: no  interest/pleasure/anhedonia: no  somatic symptoms: no  libido: no  anxiety/panic: anxiety at times  guilty/hopelessness: no  concentration: no  S.I.B.s/risky behavior: no  Irritability: no  Racing thoughts: no  Impulsive behaviors: no  Paranoia:no  AVH:no    Recent alcohol: no  Recent drug: no    Medical ROS   Multiple physical problems     PAST MEDICAL, FAMILY AND SOCIAL HISTORY: The patient's past medical, family and social history have been reviewed and updated as appropriate within the electronic medical record - see encounter notes.    PSYCHOTROPIC MEDICATIONS   Trazodone  mg at bedtime prn, Buspar 15 mg tid, Celexa 40 mg qam, Depakote 500 mg bid[for headaches], Hydroxyzine prn for migraines[usually takes 25 mg tid]    Scheduled and PRN Medications     Current Outpatient Medications:     albuterol (PROAIR HFA) 90 mcg/actuation inhaler, Inhale 2 puffs into the lungs every 6 (six) hours as needed for Wheezing., Disp: 18 g, Rfl: 0    busPIRone (BUSPAR) 15 MG tablet, TAKE 1 TABLET(15 MG) BY MOUTH THREE TIMES DAILY, Disp: 270 tablet, Rfl: 1    chlorhexidine (PERIDEX) 0.12 % solution, swish AND SPIT FIFTEEN MILLILITERS BY MOUTH TWICE DAILY, Disp: , Rfl: 0    citalopram (CELEXA) 40 MG tablet, TAKE 1 TABLET(40 MG) BY MOUTH EVERY DAY, Disp: 90 tablet, Rfl: 1     clotrimazole-betamethasone 1-0.05% (LOTRISONE) cream, Apply topically 2 (two) times daily., Disp: 45 g, Rfl: 1    divalproex (DEPAKOTE) 250 MG EC tablet, 1 pill in morning, 1 pill in evening (Patient taking differently: Take 500 mg by mouth 2 (two) times daily.), Disp: 60 tablet, Rfl: 6    fenofibrate 160 MG Tab, Take 160 mg by mouth once daily., Disp: , Rfl:     gabapentin (NEURONTIN) 300 MG capsule, Take 2 capsules (600 mg total) by mouth once daily., Disp: 180 capsule, Rfl: 11    hydrOXYzine HCL (ATARAX) 25 MG tablet, Take 1 tablet (25 mg total) by mouth 3 (three) times daily as needed for Anxiety., Disp: 90 tablet, Rfl: 3    hydrOXYzine pamoate (VISTARIL) 25 MG Cap, 1 pill 3x a day for 2 days, rest left over every 8 hours as needed for migraine (Patient taking differently: Take 25 mg by mouth once daily. 1 pill 3x a day for 2 days, rest left over every 8 hours as needed for migraine), Disp: 15 capsule, Rfl: 3    hydrOXYzine pamoate (VISTARIL) 25 MG Cap, Take 1 capsule (25 mg total) by mouth 3 (three) times daily as needed., Disp: 30 capsule, Rfl: 3    lidocaine HCl 3 % Crea, APPLY 2 grams to the painful area THREE TIMES A DAY, Disp: , Rfl: 3    metoprolol succinate (TOPROL-XL) 25 MG 24 hr tablet, Take 25 mg by mouth 2 (two) times daily. , Disp: , Rfl:     midodrine (PROAMATINE) 2.5 MG Tab, Take 2.5 mg by mouth 2 (two) times daily with meals. , Disp: , Rfl:     mupirocin (BACTROBAN) 2 % ointment, 2 (two) times daily., Disp: , Rfl: 0    nitroGLYCERIN (NITROSTAT) 0.4 MG SL tablet, Place 0.4 mg under the tongue every 5 (five) minutes as needed for Chest pain., Disp: , Rfl:     NON FORMULARY MEDICATION, Apply topically 4 (four) times daily as needed. Gabapentin 5%, Orphenadrine 2%, Lidocaine 2%, Prilocaine 2%, Disp: , Rfl:     omega-3 acid ethyl esters (LOVAZA) 1 gram capsule, Take 2 capsules by mouth 2 (two) times daily., Disp: , Rfl: 3    oxybutynin (DITROPAN-XL) 10 MG 24 hr tablet, Take 1 tablet (10 mg total) by  mouth 2 (two) times a day., Disp: 180 tablet, Rfl: 3    oxycodone-acetaminophen (PERCOCET)  mg per tablet, Take 1 tablet by mouth every 4 (four) hours as needed for Pain., Disp: 30 tablet, Rfl: 0    pravastatin (PRAVACHOL) 80 MG tablet, Take 80 mg by mouth once daily., Disp: , Rfl: 4    RANEXA 1,000 mg Tb12, Take 1,000 mg by mouth 2 (two) times daily., Disp: , Rfl: 3    RESTASIS 0.05 % ophthalmic emulsion, Place 1 drop into both eyes 2 (two) times daily., Disp: , Rfl: 4    tamsulosin (FLOMAX) 0.4 mg Cap, Take 2 capsules (0.8 mg total) by mouth once daily., Disp: 180 capsule, Rfl: 3    tizanidine (ZANAFLEX) 4 MG tablet, ONE TABLET BY MOUTH IN THE EVENING, Disp: , Rfl: 2    traZODone (DESYREL) 150 MG tablet, TAKE 1 TABLET BY MOUTH AT BEDTIME AS NEEDED FOR SLEEP, Disp: 90 tablet, Rfl: 1    XARELTO 15 mg Tab, ONE TABLET BY MOUTH TWICE DAILY FOR 21 DAYS, Disp: , Rfl: 0    EXAMINATION    Vitals:    05/02/23 1257   BP: 121/67   Pulse: 92     Pt. States, that BP fluctuates.    CONSTITUTIONAL  General Appearance: well nourished    MUSCULOSKELETAL  Muscle Strength and Tone: normal strength and tone  Abnormal Involuntary Movements: no abnormal movement noted  Gait and Station: normal gait    PSYCHIATRIC   Level of Consciousness: alert  Orientation: oriented to person, place and time  Grooming: well groomed  Psychomotor Behavior: no restlessness/agitation  Speech: normal in rate, rhythm and volume  Language: normal vocabulary  Mood: anxious at times  Affect: full range and appropriate  Thought Process: logical and goal directed  Associations: intact associations  Thought Content: no SI/HI  Memory: grossly intact  Attention: intact to content of interview  Fund of Knowledge: appears adequate  Insight: good  Judgement: good    MEDICAL DECISION MAKING    DIAGNOSES  Anxiety d/o, unspecified    PROBLEM LIST AND MANAGEMENT PLANS  - anxiety:   Continue Celexa, Buspar, and Trazodone as above  - rtc 3 months     Time with patient:  15 min    LABORATORY DATA  No visits with results within 3 Month(s) from this visit.   Latest known visit with results is:   Office Visit on 10/28/2022   Component Date Value Ref Range Status    Color, UA 10/31/2022 Yellow   Final    Spec Grav UA 10/31/2022 1.020   Final    pH, UA 10/31/2022 6   Final    WBC, UA 10/31/2022 Negative   Final    Nitrite, UA 10/31/2022 Negative   Final    Protein, POC 10/31/2022 Negative   Final    Glucose, UA 10/31/2022 250   Final    Ketones, UA 10/31/2022 Negative   Final    Urobilinogen, UA 10/31/2022 Normal   Final    Bilirubin, POC 10/31/2022 Negative   Final    Blood, UA 10/31/2022 Negative   Final           Sampson Luke

## 2023-06-16 ENCOUNTER — OFFICE VISIT (OUTPATIENT)
Dept: UROLOGY | Facility: CLINIC | Age: 63
End: 2023-06-16
Payer: MEDICARE

## 2023-06-16 VITALS — WEIGHT: 197 LBS | BODY MASS INDEX: 29.09 KG/M2

## 2023-06-16 DIAGNOSIS — N50.811 PAIN IN BOTH TESTICLES: Primary | ICD-10-CM

## 2023-06-16 DIAGNOSIS — R39.15 URINARY URGENCY: ICD-10-CM

## 2023-06-16 DIAGNOSIS — N50.812 PAIN IN BOTH TESTICLES: Primary | ICD-10-CM

## 2023-06-16 DIAGNOSIS — N40.0 BPH WITHOUT OBSTRUCTION/LOWER URINARY TRACT SYMPTOMS: ICD-10-CM

## 2023-06-16 PROCEDURE — 99214 PR OFFICE/OUTPT VISIT, EST, LEVL IV, 30-39 MIN: ICD-10-PCS | Mod: S$PBB,,, | Performed by: UROLOGY

## 2023-06-16 PROCEDURE — 99999 PR PBB SHADOW E&M-EST. PATIENT-LVL IV: ICD-10-PCS | Mod: PBBFAC,,, | Performed by: UROLOGY

## 2023-06-16 PROCEDURE — 99999 PR PBB SHADOW E&M-EST. PATIENT-LVL IV: CPT | Mod: PBBFAC,,, | Performed by: UROLOGY

## 2023-06-16 PROCEDURE — 99214 OFFICE O/P EST MOD 30 MIN: CPT | Mod: S$PBB,,, | Performed by: UROLOGY

## 2023-06-16 PROCEDURE — 99214 OFFICE O/P EST MOD 30 MIN: CPT | Mod: PBBFAC | Performed by: UROLOGY

## 2023-06-16 PROCEDURE — 81001 URINALYSIS AUTO W/SCOPE: CPT | Mod: PBBFAC | Performed by: UROLOGY

## 2023-06-16 RX ORDER — SOLIFENACIN SUCCINATE 10 MG/1
10 TABLET, FILM COATED ORAL DAILY
Qty: 90 TABLET | Refills: 3 | Status: SHIPPED | OUTPATIENT
Start: 2023-06-16 | End: 2023-06-21

## 2023-06-16 NOTE — PROGRESS NOTES
Subjective:       Patient ID: Min Nunez is a 62 y.o. male The patient's last visit with me was on 10/28/2022.     Chief Complaint:   Chief Complaint   Patient presents with    Follow-up     [facility] months ago         Orchalgia  He had a bilateral epididymectomy on 8/5/2016.  He continues to have right sided swelling and bilateral pain.  His right sided pain is worse than left.   He complains of pain with sitting, standing, getting up to stand, after roly movements, lifting, and walking.  He has difficulty walking or performing exercises.    10/20/2021   He continues to have pain, and it seems worse recently.   He denies any hematuria, dysuria, or trauma.      1/28/2022  His pain is about the same.  He is trying to avoid heavy lifting.     7/29/2022  He is about the same.  He had a heart attack recently.  He had an angioplasty.    10/28/2022  His pain is about the same.  No new issues.    06/16/2023   The orchalgia is about the same.  He does not want any surgery.  He is back with an ultrasound.      Urinary frequency and Urgency Incontinence  He has some frequency and urgency that is managed with Ditropan XL 10 mg and Myrbetriq 25 mg and Flomax.  Vesicare is no longer covered.      1/28/2022  IPSS Questionnaire (AUA-7):18/4    7/29/2022  IPSS Questionnaire (AUA-7):  19/5    10/28/2022  He is doing okay with Oxybutynin XL 10 mg BID, and Flomax BID.  He has noted some penile irritation after intercourse, he has been using his wife's estradiol cream.    IPSS Questionnaire (AUA-7):  17/3    06/16/2023    He is taking Flomax and Oxybutynin.  Myrbetriq is too expensive.  IPSS Questionnaire (AUA-7):  Over the past month    1)  How often have you had a sensation of not emptying your bladder completely after you finish urinating?  3 - About half the time   2)  How often have you had to urinate again less than two hours after you finished urinating? 3 - About half the time   3)  How  often have you found you stopped and started again several times when you urinated?  5 - Almost always   4) How difficult have you found it to postpone urination?  3 - About half the time   5) How often have you had a weak urinary stream?  3 - About half the time   6) How often have you had to push or strain to begin urination?  3 - About half the time   7) How many times did you most typically get up to urinate from the time you went to bed until the time you got up in the morning?  4 - 4 times   Total score:  0-7 mildly symptomatic    8-19 moderately symptomatic    20-35 severely symptomatic  24/3        ACTIVE MEDICAL ISSUES:  Patient Active Problem List   Diagnosis    Chest pain, exertional    Coronary artery disease, occlusive    Coronary atherosclerosis    Orchalgia    Dizziness    Syncope    Abnormal brain MRI    Migraines    Atypical migraine    HA (headache)    Numbness    Facial droop    Faintness    Groin pain    Constipation    Hypotension    Urinary urgency    Nocturia more than twice per night    Pneumonia of right lower lobe due to infectious organism    Hypertension    Mixed hyperlipidemia    Morbid obesity    Anxiety       ALLERGIES AND MEDICATIONS: updated and reviewed.  Review of patient's allergies indicates:  No Known Allergies  Current Outpatient Medications   Medication Sig    albuterol (PROAIR HFA) 90 mcg/actuation inhaler Inhale 2 puffs into the lungs every 6 (six) hours as needed for Wheezing.    busPIRone (BUSPAR) 15 MG tablet TAKE 1 TABLET(15 MG) BY MOUTH THREE TIMES DAILY    chlorhexidine (PERIDEX) 0.12 % solution swish AND SPIT FIFTEEN MILLILITERS BY MOUTH TWICE DAILY    citalopram (CELEXA) 40 MG tablet TAKE 1 TABLET(40 MG) BY MOUTH EVERY DAY    clotrimazole-betamethasone 1-0.05% (LOTRISONE) cream Apply topically 2 (two) times daily.    divalproex (DEPAKOTE) 250 MG EC tablet 1 pill in morning, 1 pill in evening (Patient taking differently: Take 500 mg by mouth 2 (two) times daily.)     fenofibrate 160 MG Tab Take 160 mg by mouth once daily.    gabapentin (NEURONTIN) 300 MG capsule Take 2 capsules (600 mg total) by mouth once daily.    hydrOXYzine HCL (ATARAX) 25 MG tablet Take 1 tablet (25 mg total) by mouth 3 (three) times daily as needed for Anxiety.    hydrOXYzine pamoate (VISTARIL) 25 MG Cap 1 pill 3x a day for 2 days, rest left over every 8 hours as needed for migraine (Patient taking differently: Take 25 mg by mouth once daily. 1 pill 3x a day for 2 days, rest left over every 8 hours as needed for migraine)    hydrOXYzine pamoate (VISTARIL) 25 MG Cap Take 1 capsule (25 mg total) by mouth 3 (three) times daily as needed.    lidocaine HCl 3 % Crea APPLY 2 grams to the painful area THREE TIMES A DAY    metoprolol succinate (TOPROL-XL) 25 MG 24 hr tablet Take 25 mg by mouth 2 (two) times daily.     midodrine (PROAMATINE) 2.5 MG Tab Take 2.5 mg by mouth 2 (two) times daily with meals.     mupirocin (BACTROBAN) 2 % ointment 2 (two) times daily.    nitroGLYCERIN (NITROSTAT) 0.4 MG SL tablet Place 0.4 mg under the tongue every 5 (five) minutes as needed for Chest pain.    NON FORMULARY MEDICATION Apply topically 4 (four) times daily as needed. Gabapentin 5%, Orphenadrine 2%, Lidocaine 2%, Prilocaine 2%    omega-3 acid ethyl esters (LOVAZA) 1 gram capsule Take 2 capsules by mouth 2 (two) times daily.    oxycodone-acetaminophen (PERCOCET)  mg per tablet Take 1 tablet by mouth every 4 (four) hours as needed for Pain.    pravastatin (PRAVACHOL) 80 MG tablet Take 80 mg by mouth once daily.    RANEXA 1,000 mg Tb12 Take 1,000 mg by mouth 2 (two) times daily.    RESTASIS 0.05 % ophthalmic emulsion Place 1 drop into both eyes 2 (two) times daily.    solifenacin (VESICARE) 10 MG tablet Take 1 tablet (10 mg total) by mouth once daily.    tamsulosin (FLOMAX) 0.4 mg Cap Take 2 capsules (0.8 mg total) by mouth once daily.    tizanidine (ZANAFLEX) 4 MG tablet ONE TABLET BY MOUTH IN THE EVENING    traZODone  (DESYREL) 150 MG tablet TAKE 1 TABLET BY MOUTH AT BEDTIME AS NEEDED FOR SLEEP    XARELTO 15 mg Tab ONE TABLET BY MOUTH TWICE DAILY FOR 21 DAYS     No current facility-administered medications for this visit.       Review of Systems   Constitutional:  Negative for activity change, fatigue, fever and unexpected weight change.   HENT:  Negative for congestion.    Eyes:  Negative for redness.   Respiratory:  Negative for chest tightness and shortness of breath.    Cardiovascular:  Negative for chest pain and leg swelling.   Gastrointestinal:  Negative for abdominal pain, constipation, diarrhea, nausea and vomiting.   Genitourinary:  Positive for testicular pain and urgency. Negative for dysuria, flank pain, frequency, hematuria, penile pain, penile swelling and scrotal swelling.   Musculoskeletal:  Negative for arthralgias and back pain.   Neurological:  Negative for dizziness and light-headedness.   Psychiatric/Behavioral:  Negative for behavioral problems and confusion. The patient is not nervous/anxious.    All other systems reviewed and are negative.    Objective:      Vitals:    06/16/23 1325   Weight: 89.4 kg (196 lb 15.7 oz)           Physical Exam  Vitals and nursing note reviewed.   Constitutional:       Appearance: He is well-developed.   HENT:      Head: Normocephalic.   Eyes:      Conjunctiva/sclera: Conjunctivae normal.   Neck:      Thyroid: No thyromegaly.      Trachea: No tracheal deviation.   Cardiovascular:      Rate and Rhythm: Normal rate.      Heart sounds: Normal heart sounds.   Pulmonary:      Effort: Pulmonary effort is normal. No respiratory distress.      Breath sounds: Normal breath sounds. No wheezing.   Abdominal:      General: Bowel sounds are normal.      Palpations: Abdomen is soft.      Tenderness: There is no abdominal tenderness. There is no rebound.      Hernia: No hernia is present.   Musculoskeletal:         General: No tenderness. Normal range of motion.      Cervical back: Normal  range of motion and neck supple.   Lymphadenopathy:      Cervical: No cervical adenopathy.   Skin:     General: Skin is warm and dry.      Findings: No erythema or rash.   Neurological:      Mental Status: He is alert and oriented to person, place, and time.   Psychiatric:         Behavior: Behavior normal.         Thought Content: Thought content normal.         Judgment: Judgment normal.       Urine dipstick shows negative for all components.  Micro exam: negative for WBC's or RBC's.    9/2021  [facility] Scrotal US  Good blood flow, bilaterally  Right 4.6 cm spermatocele    4/19/2022  Good blood flow bilaterally  Right 4.7 cm spermatocele    3/2023  Good Blood flow  Left epididymal head cyst 1.7 cm  Right epididymal head cyst 5.2 cm      Assessment:       1. Pain in both testicles    2. Urinary urgency    3. BPH without obstruction/lower urinary tract symptoms                Plan:       1. Pain in both testicles  [facility]     2. Urinary urgency  Change to Vesicare    - solifenacin (VESICARE) 10 MG tablet; Take 1 tablet (10 mg total) by mouth once daily.  Dispense: 90 tablet; Refill: 3    3. BPH without obstruction/lower urinary tract symptoms    - Prostate Specific Antigen, Diagnostic; Future      Follow up in about 3 months (around 9/16/2023) for Follow up Established, Review PSA.

## 2023-06-21 ENCOUNTER — TELEPHONE (OUTPATIENT)
Dept: UROLOGY | Facility: CLINIC | Age: 63
End: 2023-06-21
Payer: MEDICARE

## 2023-06-21 DIAGNOSIS — R39.15 URINARY URGENCY: Primary | ICD-10-CM

## 2023-06-21 RX ORDER — OXYBUTYNIN CHLORIDE 10 MG/1
10 TABLET, EXTENDED RELEASE ORAL DAILY
Qty: 30 TABLET | Refills: 11 | Status: SHIPPED | OUTPATIENT
Start: 2023-06-21 | End: 2023-08-10

## 2023-06-21 NOTE — TELEPHONE ENCOUNTER
----- Message from Nhi Prado RN sent at 6/21/2023  2:49 PM CDT -----  Regarding: FW: pt called    ----- Message -----  From: Lexi Carver  Sent: 6/21/2023  11:33 AM CDT  To: [facility] Staff  Subject: pt called                                        Type: Patient Call Back         Who called: BRITNI HILL [[id number]]         What is the request in detail:Pt wants to swap meds back, he can not afford the new one. He would like to go back to taking oxybutynin (DITROPAN-XL) 10 MG 24 hr tablet . Please Advise            [facility]         Would the patient rather a call back or a response via [facility]? Call back         Best call back number:           [phone]              Additional Information:         Thank you.

## 2023-08-10 DIAGNOSIS — R39.15 URINARY URGENCY: ICD-10-CM

## 2023-08-10 RX ORDER — OXYBUTYNIN CHLORIDE 10 MG/1
10 TABLET, EXTENDED RELEASE ORAL 2 TIMES DAILY
Qty: 180 TABLET | Refills: 3 | Status: SHIPPED | OUTPATIENT
Start: 2023-08-10 | End: 2023-12-18 | Stop reason: SDUPTHER

## 2023-09-08 ENCOUNTER — LAB VISIT (OUTPATIENT)
Dept: LAB | Facility: HOSPITAL | Age: 63
End: 2023-09-08
Attending: UROLOGY
Payer: MEDICARE

## 2023-09-08 DIAGNOSIS — N40.0 BPH WITHOUT OBSTRUCTION/LOWER URINARY TRACT SYMPTOMS: ICD-10-CM

## 2023-09-08 LAB — COMPLEXED PSA SERPL-MCNC: 0.43 NG/ML (ref 0–4)

## 2023-09-08 PROCEDURE — 84153 ASSAY OF PSA TOTAL: CPT | Performed by: UROLOGY

## 2023-09-08 PROCEDURE — 36415 COLL VENOUS BLD VENIPUNCTURE: CPT | Mod: PO | Performed by: UROLOGY

## 2023-09-12 ENCOUNTER — OFFICE VISIT (OUTPATIENT)
Dept: PSYCHIATRY | Facility: CLINIC | Age: 63
End: 2023-09-12
Payer: MEDICARE

## 2023-09-12 VITALS
DIASTOLIC BLOOD PRESSURE: 71 MMHG | BODY MASS INDEX: 33.44 KG/M2 | SYSTOLIC BLOOD PRESSURE: 143 MMHG | HEART RATE: 85 BPM | WEIGHT: 226.44 LBS

## 2023-09-12 DIAGNOSIS — F41.9 ANXIETY: Primary | ICD-10-CM

## 2023-09-12 PROCEDURE — 99214 PR OFFICE/OUTPT VISIT, EST, LEVL IV, 30-39 MIN: ICD-10-PCS | Mod: S$PBB,,, | Performed by: PSYCHIATRY & NEUROLOGY

## 2023-09-12 PROCEDURE — 99999 PR PBB SHADOW E&M-EST. PATIENT-LVL I: ICD-10-PCS | Mod: PBBFAC,,, | Performed by: PSYCHIATRY & NEUROLOGY

## 2023-09-12 PROCEDURE — 99211 OFF/OP EST MAY X REQ PHY/QHP: CPT | Mod: PBBFAC | Performed by: PSYCHIATRY & NEUROLOGY

## 2023-09-12 PROCEDURE — 99214 OFFICE O/P EST MOD 30 MIN: CPT | Mod: S$PBB,,, | Performed by: PSYCHIATRY & NEUROLOGY

## 2023-09-12 PROCEDURE — 99999 PR PBB SHADOW E&M-EST. PATIENT-LVL I: CPT | Mod: PBBFAC,,, | Performed by: PSYCHIATRY & NEUROLOGY

## 2023-09-12 RX ORDER — BUSPIRONE HYDROCHLORIDE 15 MG/1
15 TABLET ORAL 3 TIMES DAILY
Qty: 270 TABLET | Refills: 1 | Status: SHIPPED | OUTPATIENT
Start: 2023-09-12 | End: 2024-01-05 | Stop reason: SDUPTHER

## 2023-09-12 RX ORDER — HYDROXYZINE HYDROCHLORIDE 25 MG/1
25 TABLET, FILM COATED ORAL 3 TIMES DAILY PRN
Qty: 270 TABLET | Refills: 1 | Status: SHIPPED | OUTPATIENT
Start: 2023-09-12 | End: 2024-01-05 | Stop reason: SDUPTHER

## 2023-09-12 RX ORDER — TRAZODONE HYDROCHLORIDE 150 MG/1
150 TABLET ORAL NIGHTLY
Qty: 90 TABLET | Refills: 1 | Status: SHIPPED | OUTPATIENT
Start: 2023-09-12 | End: 2024-01-05 | Stop reason: SDUPTHER

## 2023-09-12 RX ORDER — CITALOPRAM 40 MG/1
TABLET, FILM COATED ORAL
Qty: 90 TABLET | Refills: 1 | Status: SHIPPED | OUTPATIENT
Start: 2023-09-12 | End: 2024-01-05 | Stop reason: SDUPTHER

## 2023-09-12 NOTE — PROGRESS NOTES
ESTABLISHED OUTPATIENT VISIT   E/M LEVEL 4: [id number]    ENCOUNTER DATE: 9/12/2023  SITE: [facility], [address]    HISTORY    CHIEF COMPLAINT   Min Nunez is a 62 y.o. male who presents for follow up of anxiety/depression.    HPI    Continues to struggle with multiple physical issues. Physical pain is troublesome.    Overall appears stable psychiatrically.    Satisfied with current psychotropic medication regimen.    Watches TV.    Stress related to wife.    Son in a intermediate house.    Psychiatric Review Of Systems - Is patient experiencing or having changes in:  sleep: varies  appetite: no  weight: no  energy/anergy: no  interest/pleasure/anhedonia: no  somatic symptoms: no  libido: no  anxiety/panic: anxiety at times  guilty/hopelessness: no  concentration: no  S.I.B.s/risky behavior: no  Irritability: no  Racing thoughts: no  Impulsive behaviors: no  Paranoia:no  AVH:no    Recent alcohol: no  Recent drug: no    Medical ROS   Multiple physical problems     PAST MEDICAL, FAMILY AND SOCIAL HISTORY: The patient's past medical, family and social history have been reviewed and updated as appropriate within the electronic medical record - see encounter notes.    PSYCHOTROPIC MEDICATIONS   Trazodone  mg at bedtime prn, Buspar 15 mg tid, Celexa 40 mg qam, Depakote 500 mg bid[for headaches], Hydroxyzine prn for migraines[takes 25 mg bid-tid]    Scheduled and PRN Medications     Current Outpatient Medications:     albuterol (PROAIR HFA) 90 mcg/actuation inhaler, Inhale 2 puffs into the lungs every 6 (six) hours as needed for Wheezing., Disp: 18 g, Rfl: 0    busPIRone (BUSPAR) 15 MG tablet, TAKE 1 TABLET(15 MG) BY MOUTH THREE TIMES DAILY, Disp: 270 tablet, Rfl: 1    chlorhexidine (PERIDEX) 0.12 % solution, swish AND SPIT FIFTEEN MILLILITERS BY MOUTH TWICE DAILY, Disp: , Rfl: 0    citalopram (CELEXA) 40 MG tablet, TAKE 1 TABLET(40 MG) BY MOUTH EVERY DAY, Disp: 90 tablet, Rfl: 1     clotrimazole-betamethasone 1-0.05% (LOTRISONE) cream, Apply topically 2 (two) times daily., Disp: 45 g, Rfl: 1    divalproex (DEPAKOTE) 250 MG EC tablet, 1 pill in morning, 1 pill in evening (Patient taking differently: Take 500 mg by mouth 2 (two) times daily.), Disp: 60 tablet, Rfl: 6    fenofibrate 160 MG Tab, Take 160 mg by mouth once daily., Disp: , Rfl:     gabapentin (NEURONTIN) 300 MG capsule, Take 2 capsules (600 mg total) by mouth once daily., Disp: 180 capsule, Rfl: 11    hydrOXYzine HCL (ATARAX) 25 MG tablet, Take 1 tablet (25 mg total) by mouth 3 (three) times daily as needed for Anxiety., Disp: 270 tablet, Rfl: 1    hydrOXYzine pamoate (VISTARIL) 25 MG Cap, 1 pill 3x a day for 2 days, rest left over every 8 hours as needed for migraine (Patient taking differently: Take 25 mg by mouth once daily. 1 pill 3x a day for 2 days, rest left over every 8 hours as needed for migraine), Disp: 15 capsule, Rfl: 3    hydrOXYzine pamoate (VISTARIL) 25 MG Cap, Take 1 capsule (25 mg total) by mouth 3 (three) times daily as needed., Disp: 30 capsule, Rfl: 3    lidocaine HCl 3 % Crea, APPLY 2 grams to the painful area THREE TIMES A DAY, Disp: , Rfl: 3    metoprolol succinate (TOPROL-XL) 25 MG 24 hr tablet, Take 25 mg by mouth 2 (two) times daily. , Disp: , Rfl:     midodrine (PROAMATINE) 2.5 MG Tab, Take 2.5 mg by mouth 2 (two) times daily with meals. , Disp: , Rfl:     mupirocin (BACTROBAN) 2 % ointment, 2 (two) times daily., Disp: , Rfl: 0    nitroGLYCERIN (NITROSTAT) 0.4 MG SL tablet, Place 0.4 mg under the tongue every 5 (five) minutes as needed for Chest pain., Disp: , Rfl:     NON FORMULARY MEDICATION, Apply topically 4 (four) times daily as needed. Gabapentin 5%, Orphenadrine 2%, Lidocaine 2%, Prilocaine 2%, Disp: , Rfl:     omega-3 acid ethyl esters (LOVAZA) 1 gram capsule, Take 2 capsules by mouth 2 (two) times daily., Disp: , Rfl: 3    oxybutynin (DITROPAN-XL) 10 MG 24 hr tablet, TAKE 1 TABLET(10 MG) BY MOUTH  TWICE DAILY, Disp: 180 tablet, Rfl: 3    oxycodone-acetaminophen (PERCOCET)  mg per tablet, Take 1 tablet by mouth every 4 (four) hours as needed for Pain., Disp: 30 tablet, Rfl: 0    pravastatin (PRAVACHOL) 80 MG tablet, Take 80 mg by mouth once daily., Disp: , Rfl: 4    RANEXA 1,000 mg Tb12, Take 1,000 mg by mouth 2 (two) times daily., Disp: , Rfl: 3    RESTASIS 0.05 % ophthalmic emulsion, Place 1 drop into both eyes 2 (two) times daily., Disp: , Rfl: 4    tamsulosin (FLOMAX) 0.4 mg Cap, Take 2 capsules (0.8 mg total) by mouth once daily., Disp: 180 capsule, Rfl: 3    tizanidine (ZANAFLEX) 4 MG tablet, ONE TABLET BY MOUTH IN THE EVENING, Disp: , Rfl: 2    traZODone (DESYREL) 150 MG tablet, TAKE 1 TABLET BY MOUTH AT BEDTIME AS NEEDED FOR SLEEP, Disp: 90 tablet, Rfl: 1    XARELTO 15 mg Tab, ONE TABLET BY MOUTH TWICE DAILY FOR 21 DAYS, Disp: , Rfl: 0    EXAMINATION    Vitals:    09/12/23 1319   BP: (!) 143/71   Pulse: 85     Pt. States, that BP fluctuates.    CONSTITUTIONAL  General Appearance: well nourished    MUSCULOSKELETAL  Muscle Strength and Tone: normal strength and tone  Abnormal Involuntary Movements: no abnormal movement noted  Gait and Station: normal gait    PSYCHIATRIC   Level of Consciousness: alert  Orientation: oriented to person, place and time  Grooming: well groomed  Psychomotor Behavior: no restlessness/agitation  Speech: normal in rate, rhythm and volume  Language: normal vocabulary  Mood: anxious at times  Affect: full range and appropriate  Thought Process: logical and goal directed  Associations: intact associations  Thought Content: no SI/HI  Memory: grossly intact  Attention: intact to content of interview  Fund of Knowledge: appears adequate  Insight: good  Judgement: good    MEDICAL DECISION MAKING    DIAGNOSES  Anxiety d/o, unspecified    PROBLEM LIST AND MANAGEMENT PLANS  - anxiety:   Continue Celexa, Buspar, prn Trazodone, and prn Hydroxyzine as above  - rtc 3 months     Time  with patient: 15 min    LABORATORY DATA  Lab Visit on 09/08/2023   Component Date Value Ref Range Status    PSA Diagnostic 09/08/2023 0.43  0.00 - 4.00 ng/mL Final    Comment: The testing method is a chemiluminescent microparticle immunoassay   manufactured by [employer] and performed on the [employer]   or   [employer] system. Values obtained with different assay manufacturers   for   methods may be different and cannot be used interchangeably.  PSA Expected levels:  Hormonal Therapy: <0.05 ng/ml  Prostatectomy: <0.01 ng/ml  Radiation Therapy: <1.00 ng/ml             Sampson Luke

## 2023-09-18 ENCOUNTER — OFFICE VISIT (OUTPATIENT)
Dept: UROLOGY | Facility: CLINIC | Age: 63
End: 2023-09-18
Payer: MEDICARE

## 2023-09-18 VITALS — BODY MASS INDEX: 33.27 KG/M2 | WEIGHT: 225.31 LBS

## 2023-09-18 DIAGNOSIS — N40.0 BPH WITHOUT OBSTRUCTION/LOWER URINARY TRACT SYMPTOMS: ICD-10-CM

## 2023-09-18 DIAGNOSIS — N50.812 PAIN IN BOTH TESTICLES: ICD-10-CM

## 2023-09-18 DIAGNOSIS — R39.15 URINARY URGENCY: Primary | ICD-10-CM

## 2023-09-18 DIAGNOSIS — R35.0 URINARY FREQUENCY: ICD-10-CM

## 2023-09-18 DIAGNOSIS — N50.811 PAIN IN BOTH TESTICLES: ICD-10-CM

## 2023-09-18 PROCEDURE — 99999 PR PBB SHADOW E&M-EST. PATIENT-LVL IV: CPT | Mod: PBBFAC,,, | Performed by: UROLOGY

## 2023-09-18 PROCEDURE — 99999 PR PBB SHADOW E&M-EST. PATIENT-LVL IV: ICD-10-PCS | Mod: PBBFAC,,, | Performed by: UROLOGY

## 2023-09-18 PROCEDURE — 99214 OFFICE O/P EST MOD 30 MIN: CPT | Mod: PBBFAC | Performed by: UROLOGY

## 2023-09-18 PROCEDURE — 99213 PR OFFICE/OUTPT VISIT, EST, LEVL III, 20-29 MIN: ICD-10-PCS | Mod: S$PBB,,, | Performed by: UROLOGY

## 2023-09-18 PROCEDURE — 99213 OFFICE O/P EST LOW 20 MIN: CPT | Mod: S$PBB,,, | Performed by: UROLOGY

## 2023-09-18 NOTE — PROGRESS NOTES
Subjective:       Patient ID: Min Nunez is a 62 y.o. male The patient's last visit with me was on 6/16/2023.     Chief Complaint:   Chief Complaint   Patient presents with    Results     Pt states he is having pain in his scrotum. Pt states he has been in pain since 2015.         Orchalgia  He had a bilateral epididymectomy on 8/5/2016.  He continues to have right sided swelling and bilateral pain.  His right sided pain is worse than left.   He complains of pain with sitting, standing, getting up to stand, after roly movements, lifting, and walking.  He has difficulty walking or performing exercises.    10/20/2021   He continues to have pain, and it seems worse recently.   He denies any hematuria, dysuria, or trauma.      1/28/2022  His pain is about the same.  He is trying to avoid heavy lifting.     7/29/2022  He is about the same.  He had a heart attack recently.  He had an angioplasty.    10/28/2022  His pain is about the same.  No new issues.    6/16/2023  The orchalgia is about the same.  He does not want any surgery.  He is back with an ultrasound.    09/18/2023        Urinary frequency and Urgency Incontinence  He has some frequency and urgency that is managed with Ditropan XL 10 mg and Myrbetriq 25 mg and Flomax.  Vesicare is no longer covered.      1/28/2022  IPSS Questionnaire (AUA-7):18/4    7/29/2022  IPSS Questionnaire (AUA-7):  19/5    10/28/2022  He is doing okay with Oxybutynin XL 10 mg BID, and Flomax BID.  He has noted some penile irritation after intercourse, he has been using his wife's estradiol cream.    IPSS Questionnaire (AUA-7):  17/3    6/16/2023    He is taking Flomax and Oxybutynin.  Myrbetriq is too expensive.  IPSS Questionnaire (AUA-7):  24/3    09/18/2023    He is concerned about having kidney stones.  His brother recently had issues with stones.  He has occasional back pain.  He is taking Flomax and Oxybutynin        ACTIVE MEDICAL ISSUES:  Patient Active Problem List    Diagnosis    Chest pain, exertional    Coronary artery disease, occlusive    Coronary atherosclerosis    Orchalgia    Dizziness    Syncope    Abnormal brain MRI    Migraines    Atypical migraine    HA (headache)    Numbness    Facial droop    Faintness    Groin pain    Constipation    Hypotension    Urinary urgency    Nocturia more than twice per night    Pneumonia of right lower lobe due to infectious organism    Hypertension    Mixed hyperlipidemia    Morbid obesity    Anxiety       ALLERGIES AND MEDICATIONS: updated and reviewed.  Review of patient's allergies indicates:  No Known Allergies  Current Outpatient Medications   Medication Sig    albuterol (PROAIR HFA) 90 mcg/actuation inhaler Inhale 2 puffs into the lungs every 6 (six) hours as needed for Wheezing.    busPIRone (BUSPAR) 15 MG tablet Take 1 tablet (15 mg total) by mouth 3 (three) times daily.    chlorhexidine (PERIDEX) 0.12 % solution swish AND SPIT FIFTEEN MILLILITERS BY MOUTH TWICE DAILY    citalopram (CELEXA) 40 MG tablet TAKE 1 TABLET(40 MG) BY MOUTH EVERY DAY    clotrimazole-betamethasone 1-0.05% (LOTRISONE) cream Apply topically 2 (two) times daily.    divalproex (DEPAKOTE) 250 MG EC tablet 1 pill in morning, 1 pill in evening (Patient taking differently: Take 500 mg by mouth 2 (two) times daily.)    fenofibrate 160 MG Tab Take 160 mg by mouth once daily.    gabapentin (NEURONTIN) 300 MG capsule Take 2 capsules (600 mg total) by mouth once daily.    hydrOXYzine HCL (ATARAX) 25 MG tablet Take 1 tablet (25 mg total) by mouth 3 (three) times daily as needed for Anxiety.    hydrOXYzine pamoate (VISTARIL) 25 MG Cap 1 pill 3x a day for 2 days, rest left over every 8 hours as needed for migraine (Patient taking differently: Take 25 mg by mouth once daily. 1 pill 3x a day for 2 days, rest left over every 8 hours as needed for migraine)    lidocaine HCl 3 % Crea APPLY 2 grams to the painful area THREE TIMES A DAY    metoprolol succinate (TOPROL-XL) 25  MG 24 hr tablet Take 25 mg by mouth 2 (two) times daily.     midodrine (PROAMATINE) 2.5 MG Tab Take 2.5 mg by mouth 2 (two) times daily with meals.     mupirocin (BACTROBAN) 2 % ointment 2 (two) times daily.    nitroGLYCERIN (NITROSTAT) 0.4 MG SL tablet Place 0.4 mg under the tongue every 5 (five) minutes as needed for Chest pain.    NON FORMULARY MEDICATION Apply topically 4 (four) times daily as needed. Gabapentin 5%, Orphenadrine 2%, Lidocaine 2%, Prilocaine 2%    omega-3 acid ethyl esters (LOVAZA) 1 gram capsule Take 2 capsules by mouth 2 (two) times daily.    oxybutynin (DITROPAN-XL) 10 MG 24 hr tablet TAKE 1 TABLET(10 MG) BY MOUTH TWICE DAILY    oxycodone-acetaminophen (PERCOCET)  mg per tablet Take 1 tablet by mouth every 4 (four) hours as needed for Pain.    pravastatin (PRAVACHOL) 80 MG tablet Take 80 mg by mouth once daily.    RANEXA 1,000 mg Tb12 Take 1,000 mg by mouth 2 (two) times daily.    RESTASIS 0.05 % ophthalmic emulsion Place 1 drop into both eyes 2 (two) times daily.    tamsulosin (FLOMAX) 0.4 mg Cap Take 2 capsules (0.8 mg total) by mouth once daily.    tizanidine (ZANAFLEX) 4 MG tablet ONE TABLET BY MOUTH IN THE EVENING    traZODone (DESYREL) 150 MG tablet Take 1 tablet (150 mg total) by mouth every evening.    XARELTO 15 mg Tab ONE TABLET BY MOUTH TWICE DAILY FOR 21 DAYS    hydrOXYzine pamoate (VISTARIL) 25 MG Cap Take 1 capsule (25 mg total) by mouth 3 (three) times daily as needed.     No current facility-administered medications for this visit.       Review of Systems   Constitutional:  Negative for activity change, fatigue, fever and unexpected weight change.   HENT:  Negative for congestion.    Eyes:  Negative for redness.   Respiratory:  Negative for chest tightness and shortness of breath.    Cardiovascular:  Negative for chest pain and leg swelling.   Gastrointestinal:  Negative for abdominal pain, constipation, diarrhea, nausea and vomiting.   Genitourinary:  Positive for  testicular pain and urgency. Negative for dysuria, flank pain, frequency, hematuria, penile pain, penile swelling and scrotal swelling.   Musculoskeletal:  Negative for arthralgias and back pain.   Neurological:  Negative for dizziness and light-headedness.   Psychiatric/Behavioral:  Negative for behavioral problems and confusion. The patient is not nervous/anxious.    All other systems reviewed and are negative.      Objective:      Vitals:    09/18/23 1351   Weight: 102.2 kg (225 lb 5 oz)             Physical Exam  Vitals and nursing note reviewed.   Constitutional:       Appearance: He is well-developed.   HENT:      Head: Normocephalic.   Eyes:      Conjunctiva/sclera: Conjunctivae normal.   Neck:      Thyroid: No thyromegaly.      Trachea: No tracheal deviation.   Cardiovascular:      Rate and Rhythm: Normal rate.      Heart sounds: Normal heart sounds.   Pulmonary:      Effort: Pulmonary effort is normal. No respiratory distress.      Breath sounds: Normal breath sounds. No wheezing.   Abdominal:      General: Bowel sounds are normal.      Palpations: Abdomen is soft.      Tenderness: There is no abdominal tenderness. There is no rebound.      Hernia: No hernia is present.   Musculoskeletal:         General: No tenderness. Normal range of motion.      Cervical back: Normal range of motion and neck supple.   Lymphadenopathy:      Cervical: No cervical adenopathy.   Skin:     General: Skin is warm and dry.      Findings: No erythema or rash.   Neurological:      Mental Status: He is alert and oriented to person, place, and time.   Psychiatric:         Behavior: Behavior normal.         Thought Content: Thought content normal.         Judgment: Judgment normal.         Urine dipstick shows negative for all components.  Micro exam: negative for WBC's or RBC's.    Component Ref Range & Units 10 d ago 1 yr ago 2 yr ago 3 yr ago 4 yr ago 6 yr ago 8 yr ago   PSA Diagnostic 0.00 - 4.00 ng/mL 0.43  0.42 CM  0.39 CM   0.43 CM  0.33 CM  0.44 CM  0.35 CM    Comment: The testing method is a chemiluminescent microparticle immunoassay   manufactured by [employer] and performed on the [employer]   or   [employer] system. Values obtained with different assay manufacturers   for   methods may be different and cannot be used interchangeably.   PSA Expected levels:   Hormonal Therapy: <0.05 ng/ml   Prostatectomy: <0.01 ng/ml   Radiation Therapy: <1.00 ng/ml    Resulting Agency  [facility]              Specimen Collected: 09/08/23 12:21 Last Resulted: 09/08/23 22:03                9/2021  [facility] Scrotal US  Good blood flow, bilaterally  Right 4.6 cm spermatocele    4/19/2022  Good blood flow bilaterally  Right 4.7 cm spermatocele    3/2023  Good Blood flow  Left epididymal head cyst 1.7 cm  Right epididymal head cyst 5.2 cm          Assessment:       1. Urinary urgency    2. Pain in both testicles    3. BPH without obstruction/lower urinary tract symptoms    4. Urinary frequency                  Plan:       1. Urinary urgency  Oxybutynin  - POCT urinalysis, dipstick or tablet reag    2. Pain in both testicles  Per Pain clinic      3. BPH without obstruction/lower urinary tract symptoms  Flomax    4. Urinary frequency  Consider LIBERTAD next time   Follow up in about 3 months (around 12/18/2023) for Follow up Established.

## 2023-12-18 ENCOUNTER — OFFICE VISIT (OUTPATIENT)
Dept: UROLOGY | Facility: CLINIC | Age: 63
End: 2023-12-18
Payer: MEDICARE

## 2023-12-18 VITALS — BODY MASS INDEX: 32.61 KG/M2 | WEIGHT: 220.81 LBS

## 2023-12-18 DIAGNOSIS — R35.0 URINARY FREQUENCY: ICD-10-CM

## 2023-12-18 DIAGNOSIS — N50.811 PAIN IN BOTH TESTICLES: Primary | ICD-10-CM

## 2023-12-18 DIAGNOSIS — N50.812 PAIN IN BOTH TESTICLES: Primary | ICD-10-CM

## 2023-12-18 DIAGNOSIS — R39.15 URINARY URGENCY: ICD-10-CM

## 2023-12-18 DIAGNOSIS — N40.0 BPH WITHOUT OBSTRUCTION/LOWER URINARY TRACT SYMPTOMS: ICD-10-CM

## 2023-12-18 PROCEDURE — 99214 OFFICE O/P EST MOD 30 MIN: CPT | Mod: PBBFAC | Performed by: UROLOGY

## 2023-12-18 PROCEDURE — 99214 PR OFFICE/OUTPT VISIT, EST, LEVL IV, 30-39 MIN: ICD-10-PCS | Mod: S$PBB,,, | Performed by: UROLOGY

## 2023-12-18 PROCEDURE — 99214 OFFICE O/P EST MOD 30 MIN: CPT | Mod: S$PBB,,, | Performed by: UROLOGY

## 2023-12-18 PROCEDURE — 99999 PR PBB SHADOW E&M-EST. PATIENT-LVL IV: CPT | Mod: PBBFAC,,, | Performed by: UROLOGY

## 2023-12-18 PROCEDURE — 99999 PR PBB SHADOW E&M-EST. PATIENT-LVL IV: ICD-10-PCS | Mod: PBBFAC,,, | Performed by: UROLOGY

## 2023-12-18 RX ORDER — TAMSULOSIN HYDROCHLORIDE 0.4 MG/1
0.8 CAPSULE ORAL DAILY
Qty: 180 CAPSULE | Refills: 3 | Status: SHIPPED | OUTPATIENT
Start: 2023-12-18 | End: 2024-03-18 | Stop reason: SDUPTHER

## 2023-12-18 RX ORDER — OXYBUTYNIN CHLORIDE 10 MG/1
10 TABLET, EXTENDED RELEASE ORAL 2 TIMES DAILY
Qty: 180 TABLET | Refills: 3 | Status: SHIPPED | OUTPATIENT
Start: 2023-12-18 | End: 2024-03-18 | Stop reason: SDUPTHER

## 2023-12-18 NOTE — PROGRESS NOTES
Subjective:       Patient ID: Min Nunez is a 63 y.o. male The patient's last visit with me was on 9/18/2023.     Chief Complaint:   No chief complaint on file.        Orchalgia  He had a bilateral epididymectomy on 8/5/2016.  He continues to have right sided swelling and bilateral pain.  His right sided pain is worse than left.   He complains of pain with sitting, standing, getting up to stand, after roly movements, lifting, and walking.  He has difficulty walking or performing exercises.    10/20/2021   He continues to have pain, and it seems worse recently.   He denies any hematuria, dysuria, or trauma.      1/28/2022  His pain is about the same.  He is trying to avoid heavy lifting.     7/29/2022  He is about the same.  He had a heart attack recently.  He had an angioplasty.    10/28/2022  His pain is about the same.  No new issues.    6/16/2023  The orchalgia is about the same.  He does not want any surgery.  He is back with an ultrasound.    12/18/2023  He feels about the same.  He has some neck pain and scrotal pain.      Urinary frequency and Urgency Incontinence  He has some frequency and urgency that is managed with Ditropan XL 10 mg and Myrbetriq 25 mg and Flomax.  Vesic[employer] is no longer covered.      1/28/2022  IPSS Questionnaire (AUA-7):18/4    7/29/2022  IPSS Questionnaire (AUA-7):  19/5    10/28/2022  He is doing okay with Oxybutynin XL 10 mg BID, and Flomax BID.  He has noted some penile irritation after intercourse, he has been using his wife's estradiol cream.    IPSS Questionnaire (AUA-7):  17/3    6/16/2023    He is taking Flomax and Oxybutynin.  Myrbetriq is too expensive.  IPSS Questionnaire (AUA-7):  24/3    12/18/2023    He is concerned about having kidney stones.  His brother recently had issues with stones.  He has occasional back pain.  He is taking Flomax and Oxybutynin        ACTIVE MEDICAL ISSUES:  Patient Active Problem List   Diagnosis    Chest pain, exertional    Coronary artery  disease, occlusive    Coronary atherosclerosis    Orchalgia    Dizziness    Syncope    Abnormal brain MRI    Migraines    Atypical migraine    HA (headache)    Numbness    Facial droop    Faintness    Groin pain    Constipation    Hypotension    Urinary urgency    Nocturia more than twice per night    Pneumonia of right lower lobe due to infectious organism    Hypertension    Mixed hyperlipidemia    Morbid obesity    Anxiety       ALLERGIES AND MEDICATIONS: updated and reviewed.  Review of patient's allergies indicates:  No Known Allergies  Current Outpatient Medications   Medication Sig    albuterol (PROAIR HFA) 90 mcg/actuation inhaler Inhale 2 puffs into the lungs every 6 (six) hours as needed for Wheezing.    busPIRone (BUSPAR) 15 MG tablet Take 1 tablet (15 mg total) by mouth 3 (three) times daily.    chlorhexidine (PERIDEX) 0.12 % solution swish AND SPIT FIFTEEN MILLILITERS BY MOUTH TWICE DAILY    citalopram (CELEXA) 40 MG tablet TAKE 1 TABLET(40 MG) BY MOUTH EVERY DAY    clotrimazole-betamethasone 1-0.05% (LOTRISONE) cream Apply topically 2 (two) times daily.    divalproex (DEPAKOTE) 250 MG EC tablet 1 pill in morning, 1 pill in evening (Patient taking differently: Take 500 mg by mouth 2 (two) times daily.)    fenofibrate 160 MG Tab Take 160 mg by mouth once daily.    gabapentin (NEURONTIN) 300 MG capsule Take 2 capsules (600 mg total) by mouth once daily.    hydrOXYzine HCL (ATARAX) 25 MG tablet Take 1 tablet (25 mg total) by mouth 3 (three) times daily as needed for Anxiety.    hydrOXYzine pamoate (VISTARIL) 25 MG Cap 1 pill 3x a day for 2 days, rest left over every 8 hours as needed for migraine (Patient taking differently: Take 25 mg by mouth once daily. 1 pill 3x a day for 2 days, rest left over every 8 hours as needed for migraine)    hydrOXYzine pamoate (VISTARIL) 25 MG Cap Take 1 capsule (25 mg total) by mouth 3 (three) times daily as needed.    lidocaine HCl 3 % Crea APPLY 2 grams to the painful area  THREE TIMES A DAY    metoprolol succinate (TOPROL-XL) 25 MG 24 hr tablet Take 25 mg by mouth 2 (two) times daily.     midodrine (PROAMATINE) 2.5 MG Tab Take 2.5 mg by mouth 2 (two) times daily with meals.     mupirocin (BACTROBAN) 2 % ointment 2 (two) times daily.    nitroGLYCERIN (NITROSTAT) 0.4 MG SL tablet Place 0.4 mg under the tongue every 5 (five) minutes as needed for Chest pain.    NON FORMULARY MEDICATION Apply topically 4 (four) times daily as needed. Gabapentin 5%, Orphenadrine 2%, Lidocaine 2%, Prilocaine 2%    omega-3 acid ethyl esters (LOVAZA) 1 gram capsule Take 2 capsules by mouth 2 (two) times daily.    oxybutynin (DITROPAN-XL) 10 MG 24 hr tablet Take 1 tablet (10 mg total) by mouth 2 (two) times daily.    oxycodone-acetaminophen (PERCOCET)  mg per tablet Take 1 tablet by mouth every 4 (four) hours as needed for Pain.    pravastatin (PRAVACHOL) 80 MG tablet Take 80 mg by mouth once daily.    RANEXA 1,000 mg Tb12 Take 1,000 mg by mouth 2 (two) times daily.    RESTASIS 0.05 % ophthalmic emulsion Place 1 drop into both eyes 2 (two) times daily.    tamsulosin (FLOMAX) 0.4 mg Cap Take 2 capsules (0.8 mg total) by mouth once daily.    tizanidine (ZANAFLEX) 4 MG tablet ONE TABLET BY MOUTH IN THE EVENING    traZODone (DESYREL) 150 MG tablet Take 1 tablet (150 mg total) by mouth every evening.    XARELTO 15 mg Tab ONE TABLET BY MOUTH TWICE DAILY FOR 21 DAYS     No current facility-administered medications for this visit.       Review of Systems   Constitutional:  Negative for activity change, fatigue, fever and unexpected weight change.   HENT:  Negative for congestion.    Eyes:  Negative for redness.   Respiratory:  Negative for chest tightness and shortness of breath.    Cardiovascular:  Negative for chest pain and leg swelling.   Gastrointestinal:  Negative for abdominal pain, constipation, diarrhea, nausea and vomiting.   Genitourinary:  Positive for testicular pain and urgency. Negative for  dysuria, flank pain, frequency, hematuria, penile pain, penile swelling and scrotal swelling.   Musculoskeletal:  Negative for arthralgias and back pain.   Neurological:  Negative for dizziness and light-headedness.   Psychiatric/Behavioral:  Negative for behavioral problems and confusion. The patient is not nervous/anxious.    All other systems reviewed and are negative.      Objective:      Vitals:    12/18/23 1341   Weight: 100.1 kg (220 lb 12.7 oz)               Physical Exam  Vitals and nursing note reviewed.   Constitutional:       Appearance: He is well-developed.   HENT:      Head: Normocephalic.   Eyes:      Conjunctiva/sclera: Conjunctivae normal.   Neck:      Thyroid: No thyromegaly.      Trachea: No tracheal deviation.   Cardiovascular:      Rate and Rhythm: Normal rate.      Heart sounds: Normal heart sounds.   Pulmonary:      Effort: Pulmonary effort is normal. No respiratory distress.      Breath sounds: Normal breath sounds. No wheezing.   Abdominal:      General: Bowel sounds are normal.      Palpations: Abdomen is soft.      Tenderness: There is no abdominal tenderness. There is no rebound.      Hernia: No hernia is present.   Musculoskeletal:         General: No tenderness. Normal range of motion.      Cervical back: Normal range of motion and neck supple.   Lymphadenopathy:      Cervical: No cervical adenopathy.   Skin:     General: Skin is warm and dry.      Findings: No erythema or rash.   Neurological:      Mental Status: He is alert and oriented to person, place, and time.   Psychiatric:         Behavior: Behavior normal.         Thought Content: Thought content normal.         Judgment: Judgment normal.         Urine dipstick shows negative for all components.  Micro exam: negative for WBC's or RBC's.    Component Ref Range & Units 10 d ago 1 yr ago 2 yr ago 3 yr ago 4 yr ago 6 yr ago 8 yr ago   PSA Diagnostic 0.00 - 4.00 ng/mL 0.43  0.42 CM  0.39 CM  0.43 CM  0.33 CM  0.44 CM  0.35 CM     Comment: The testing method is a chemiluminescent microparticle immunoassay   manufactured by [employer] and performed on the    or   [employer] system. Values obtained with different assay manufacturers   for   methods may be different and cannot be used interchangeably.   PSA Expected levels:   Hormonal Therapy: <0.05 ng/ml   Prostatectomy: <0.01 ng/ml   Radiation Therapy: <1.00 ng/ml    Resulting Agency  [facility]              Specimen Collected: 09/08/23 12:21 Last Resulted: 09/08/23 22:03                9/2021  [facility] Scrotal US  Good blood flow, bilaterally  Right 4.6 cm spermatocele    4/19/2022  Good blood flow bilaterally  Right 4.7 cm spermatocele    3/2023  Good Blood flow  Left epididymal head cyst 1.7 cm  Right epididymal head cyst 5.2 cm          Assessment:       1. Pain in both testicles    2. Urinary urgency    3. BPH without obstruction/lower urinary tract symptoms    4. Urinary frequency                    Plan:       1. Urinary urgency    - oxybutynin (DITROPAN-XL) 10 MG 24 hr tablet; Take 1 tablet (10 mg total) by mouth 2 (two) times daily.  Dispense: 180 tablet; Refill: 3    2. BPH without obstruction/lower urinary tract symptoms    - tamsulosin (FLOMAX) 0.4 mg Cap; Take 2 capsules (0.8 mg total) by mouth once daily.  Dispense: 180 capsule; Refill: 3    3. Pain in both testicles  Per pain clinic    4. Urinary frequency  As above       Follow up in about 3 months (around 3/18/2024) for Follow up Established.

## 2024-01-05 ENCOUNTER — OFFICE VISIT (OUTPATIENT)
Dept: PSYCHIATRY | Facility: CLINIC | Age: 64
End: 2024-01-05
Payer: MEDICARE

## 2024-01-05 VITALS
BODY MASS INDEX: 32.25 KG/M2 | SYSTOLIC BLOOD PRESSURE: 126 MMHG | DIASTOLIC BLOOD PRESSURE: 67 MMHG | HEART RATE: 82 BPM | WEIGHT: 218.38 LBS

## 2024-01-05 DIAGNOSIS — F41.9 ANXIETY: Primary | ICD-10-CM

## 2024-01-05 PROCEDURE — 99214 OFFICE O/P EST MOD 30 MIN: CPT | Mod: S$PBB,,, | Performed by: PSYCHIATRY & NEUROLOGY

## 2024-01-05 PROCEDURE — 99999 PR PBB SHADOW E&M-EST. PATIENT-LVL I: CPT | Mod: PBBFAC,,, | Performed by: PSYCHIATRY & NEUROLOGY

## 2024-01-05 PROCEDURE — 99211 OFF/OP EST MAY X REQ PHY/QHP: CPT | Mod: PBBFAC | Performed by: PSYCHIATRY & NEUROLOGY

## 2024-01-05 RX ORDER — TRAZODONE HYDROCHLORIDE 150 MG/1
150 TABLET ORAL NIGHTLY
Qty: 90 TABLET | Refills: 1 | Status: SHIPPED | OUTPATIENT
Start: 2024-01-05

## 2024-01-05 RX ORDER — CITALOPRAM 40 MG/1
TABLET, FILM COATED ORAL
Qty: 90 TABLET | Refills: 1 | Status: SHIPPED | OUTPATIENT
Start: 2024-01-05

## 2024-01-05 RX ORDER — HYDROXYZINE HYDROCHLORIDE 25 MG/1
25 TABLET, FILM COATED ORAL 3 TIMES DAILY PRN
Qty: 270 TABLET | Refills: 1 | Status: SHIPPED | OUTPATIENT
Start: 2024-01-05

## 2024-01-05 RX ORDER — BUSPIRONE HYDROCHLORIDE 15 MG/1
15 TABLET ORAL 3 TIMES DAILY
Qty: 270 TABLET | Refills: 1 | Status: SHIPPED | OUTPATIENT
Start: 2024-01-05

## 2024-01-05 NOTE — PROGRESS NOTES
ESTABLISHED OUTPATIENT VISIT   E/M LEVEL 4: [id number]    ENCOUNTER DATE: 1/5/2024  SITE: [facility], [address]    HISTORY    CHIEF COMPLAINT   Min Nunez is a 63 y.o. male who presents for follow up of anxiety/depression.    HPI    Continues to struggle with multiple physical issues. Physical pain is troublesome.    Overall appears stable psychiatrically.    Satisfied with current psychotropic medication regimen.    Watches TV.    Stress related to wife.    Son currently doing well.    Psychiatric Review Of Systems - Is patient experiencing or having changes in:  sleep: varies  appetite: no  weight: no  energy/anergy: no  interest/pleasure/anhedonia: no  somatic symptoms: no  libido: no  anxiety/panic: anxiety at times  guilty/hopelessness: no  concentration: no  S.I.B.s/risky behavior: no  Irritability: no  Racing thoughts: no  Impulsive behaviors: no  Paranoia:no  AVH:no    Recent alcohol: no  Recent drug: no    Medical ROS   Multiple physical problems     PAST MEDICAL, FAMILY AND SOCIAL HISTORY: The patient's past medical, family and social history have been reviewed and updated as appropriate within the electronic medical record - see encounter notes.    PSYCHOTROPIC MEDICATIONS   Trazodone  mg at bedtime prn, Buspar 15 mg tid, Celexa 40 mg qam, Depakote 500 mg bid[for headaches], Hydroxyzine prn for migraines[takes 25 mg bid-tid]    Scheduled and PRN Medications     Current Outpatient Medications:     albuterol (PROAIR HFA) 90 mcg/actuation inhaler, Inhale 2 puffs into the lungs every 6 (six) hours as needed for Wheezing., Disp: 18 g, Rfl: 0    busPIRone (BUSPAR) 15 MG tablet, Take 1 tablet (15 mg total) by mouth 3 (three) times daily., Disp: 270 tablet, Rfl: 1    chlorhexidine (PERIDEX) 0.12 % solution, swish AND SPIT FIFTEEN MILLILITERS BY MOUTH TWICE DAILY, Disp: , Rfl: 0    citalopram (CELEXA) 40 MG tablet, TAKE 1 TABLET(40 MG) BY MOUTH EVERY DAY, Disp: 90 tablet, Rfl: 1     clotrimazole-betamethasone 1-0.05% (LOTRISONE) cream, Apply topically 2 (two) times daily., Disp: 45 g, Rfl: 1    divalproex (DEPAKOTE) 250 MG EC tablet, 1 pill in morning, 1 pill in evening (Patient taking differently: Take 500 mg by mouth 2 (two) times daily.), Disp: 60 tablet, Rfl: 6    fenofibrate 160 MG Tab, Take 160 mg by mouth once daily., Disp: , Rfl:     gabapentin (NEURONTIN) 300 MG capsule, Take 2 capsules (600 mg total) by mouth once daily., Disp: 180 capsule, Rfl: 11    hydrOXYzine HCL (ATARAX) 25 MG tablet, Take 1 tablet (25 mg total) by mouth 3 (three) times daily as needed for Anxiety., Disp: 270 tablet, Rfl: 1    hydrOXYzine pamoate (VISTARIL) 25 MG Cap, 1 pill 3x a day for 2 days, rest left over every 8 hours as needed for migraine (Patient taking differently: Take 25 mg by mouth once daily. 1 pill 3x a day for 2 days, rest left over every 8 hours as needed for migraine), Disp: 15 capsule, Rfl: 3    hydrOXYzine pamoate (VISTARIL) 25 MG Cap, Take 1 capsule (25 mg total) by mouth 3 (three) times daily as needed., Disp: 30 capsule, Rfl: 3    lidocaine HCl 3 % Crea, APPLY 2 grams to the painful area THREE TIMES A DAY, Disp: , Rfl: 3    metoprolol succinate (TOPROL-XL) 25 MG 24 hr tablet, Take 25 mg by mouth 2 (two) times daily. , Disp: , Rfl:     midodrine (PROAMATINE) 2.5 MG Tab, Take 2.5 mg by mouth 2 (two) times daily with meals. , Disp: , Rfl:     mupirocin (BACTROBAN) 2 % ointment, 2 (two) times daily., Disp: , Rfl: 0    nitroGLYCERIN (NITROSTAT) 0.4 MG SL tablet, Place 0.4 mg under the tongue every 5 (five) minutes as needed for Chest pain., Disp: , Rfl:     NON FORMULARY MEDICATION, Apply topically 4 (four) times daily as needed. Gabapentin 5%, Orphenadrine 2%, Lidocaine 2%, Prilocaine 2%, Disp: , Rfl:     omega-3 acid ethyl esters (LOVAZA) 1 gram capsule, Take 2 capsules by mouth 2 (two) times daily., Disp: , Rfl: 3    oxybutynin (DITROPAN-XL) 10 MG 24 hr tablet, Take 1 tablet (10 mg total) by  mouth 2 (two) times daily., Disp: 180 tablet, Rfl: 3    oxycodone-acetaminophen (PERCOCET)  mg per tablet, Take 1 tablet by mouth every 4 (four) hours as needed for Pain., Disp: 30 tablet, Rfl: 0    pravastatin (PRAVACHOL) 80 MG tablet, Take 80 mg by mouth once daily., Disp: , Rfl: 4    RANEXA 1,000 mg Tb12, Take 1,000 mg by mouth 2 (two) times daily., Disp: , Rfl: 3    RESTASIS 0.05 % ophthalmic emulsion, Place 1 drop into both eyes 2 (two) times daily., Disp: , Rfl: 4    tamsulosin (FLOMAX) 0.4 mg Cap, Take 2 capsules (0.8 mg total) by mouth once daily., Disp: 180 capsule, Rfl: 3    tizanidine (ZANAFLEX) 4 MG tablet, ONE TABLET BY MOUTH IN THE EVENING, Disp: , Rfl: 2    traZODone (DESYREL) 150 MG tablet, Take 1 tablet (150 mg total) by mouth every evening., Disp: 90 tablet, Rfl: 1    XARELTO 15 mg Tab, ONE TABLET BY MOUTH TWICE DAILY FOR 21 DAYS, Disp: , Rfl: 0    EXAMINATION    Vitals:    01/05/24 1016   BP: 126/67   Pulse: 82     Pt. States, that BP fluctuates.    CONSTITUTIONAL  General Appearance: well nourished    MUSCULOSKELETAL  Muscle Strength and Tone: normal strength and tone  Abnormal Involuntary Movements: no abnormal movement noted  Gait and Station: normal gait    PSYCHIATRIC   Level of Consciousness: alert  Orientation: oriented to person, place and time  Grooming: well groomed  Psychomotor Behavior: no restlessness/agitation  Speech: normal in rate, rhythm and volume  Language: normal vocabulary  Mood: anxious at times  Affect: full range and appropriate  Thought Process: logical and goal directed  Associations: intact associations  Thought Content: no SI/HI  Memory: grossly intact  Attention: intact to content of interview  Fund of Knowledge: appears adequate  Insight: good  Judgement: good    MEDICAL DECISION MAKING    DIAGNOSES  Anxiety d/o, unspecified    PROBLEM LIST AND MANAGEMENT PLANS  - anxiety:   Continue Celexa, Buspar, prn Trazodone, and prn Hydroxyzine as above  - rtc 3 months      Time with patient: 20 min    LABORATORY DATA  No visits with results within 3 Month(s) from this visit.   Latest known visit with results is:   Lab Visit on 09/08/2023   Component Date Value Ref Range Status    PSA Diagnostic 09/08/2023 0.43  0.00 - 4.00 ng/mL Final    Comment: The testing method is a chemiluminescent microparticle immunoassay   manufactured by [employer] and performed on the [employer]   or   [employer] system. Values obtained with different assay manufacturers   for   methods may be different and cannot be used interchangeably.  PSA Expected levels:  Hormonal Therapy: <0.05 ng/ml  Prostatectomy: <0.01 ng/ml  Radiation Therapy: <1.00 ng/ml             Sampson Luke

## 2024-03-18 ENCOUNTER — OFFICE VISIT (OUTPATIENT)
Dept: UROLOGY | Facility: CLINIC | Age: 64
End: 2024-03-18
Payer: MEDICARE

## 2024-03-18 DIAGNOSIS — R35.0 URINARY FREQUENCY: ICD-10-CM

## 2024-03-18 DIAGNOSIS — N40.0 BPH WITHOUT OBSTRUCTION/LOWER URINARY TRACT SYMPTOMS: ICD-10-CM

## 2024-03-18 DIAGNOSIS — N50.811 PAIN IN BOTH TESTICLES: ICD-10-CM

## 2024-03-18 DIAGNOSIS — N50.812 PAIN IN BOTH TESTICLES: ICD-10-CM

## 2024-03-18 DIAGNOSIS — R39.15 URINARY URGENCY: Primary | ICD-10-CM

## 2024-03-18 PROCEDURE — 99213 OFFICE O/P EST LOW 20 MIN: CPT | Mod: PBBFAC | Performed by: UROLOGY

## 2024-03-18 PROCEDURE — 99999 PR PBB SHADOW E&M-EST. PATIENT-LVL III: CPT | Mod: PBBFAC,,, | Performed by: UROLOGY

## 2024-03-18 PROCEDURE — 99214 OFFICE O/P EST MOD 30 MIN: CPT | Mod: S$PBB,,, | Performed by: UROLOGY

## 2024-03-18 RX ORDER — OXYBUTYNIN CHLORIDE 10 MG/1
10 TABLET, EXTENDED RELEASE ORAL 2 TIMES DAILY
Qty: 180 TABLET | Refills: 3 | Status: SHIPPED | OUTPATIENT
Start: 2024-03-18

## 2024-03-18 RX ORDER — TAMSULOSIN HYDROCHLORIDE 0.4 MG/1
0.8 CAPSULE ORAL DAILY
Qty: 180 CAPSULE | Refills: 3 | Status: SHIPPED | OUTPATIENT
Start: 2024-03-18

## 2024-03-18 NOTE — PROGRESS NOTES
Subjective:       Patient ID: Min Nunez is a 63 y.o. male The patient's last visit with me was on 12/18/2023.     Chief Complaint:   Chief Complaint   Patient presents with    Follow-up         Orchalgia  He had a bilateral epididymectomy on 8/5/2016.  He continues to have right sided swelling and bilateral pain.  His right sided pain is worse than left.   He complains of pain with sitting, standing, getting up to stand, after roly movements, lifting, and walking.  He has difficulty walking or performing exercises.    10/20/2021   He continues to have pain, and it seems worse recently.   He denies any hematuria, dysuria, or trauma.      1/28/2022  His pain is about the same.  He is trying to avoid heavy lifting.     7/29/2022  He is about the same.  He had a heart attack recently.  He had an angioplasty.    10/28/2022  His pain is about the same.  No new issues.    6/16/2023  The orchalgia is about the same.  He does not want any surgery.  He is back with an ultrasound.    12/18/2023  He feels about the same.  He has some neck pain and scrotal pain.    03/18/2024  He recently had a epidural, however, it was not helpful.        Urinary frequency and Urgency Incontinence  He has some frequency and urgency that is managed with Ditropan XL 10 mg and Myrbetriq 25 mg and Flomax.  Vesicare is no longer covered.      1/28/2022  IPSS Questionnaire (AUA-7):18/4    7/29/2022  IPSS Questionnaire (AUA-7):  19/5    10/28/2022  He is doing okay with Oxybutynin XL 10 mg BID, and Flomax BID.  He has noted some penile irritation after intercourse, he has been using his wife's estradiol cream.    IPSS Questionnaire (AUA-7):  17/3    6/16/2023    He is taking Flomax and Oxybutynin.  Myrbetriq is too expensive.  IPSS Questionnaire (AUA-7):  24/3    12/18/2023  He is concerned about having kidney stones.  His brother recently had issues with stones.  He has occasional back pain.  He is taking Flomax and  Oxybutynin.    03/18/2024  He is still voiding frequently.  He is taking Flomax and Oxybutynin.      ACTIVE MEDICAL ISSUES:  Patient Active Problem List   Diagnosis    Chest pain, exertional    Coronary artery disease, occlusive    Coronary atherosclerosis    Orchalgia    Dizziness    Syncope    Abnormal brain MRI    Migraines    Atypical migraine    HA (headache)    Numbness    Facial droop    Faintness    Groin pain    Constipation    Hypotension    Urinary urgency    Nocturia more than twice per night    Pneumonia of right lower lobe due to infectious organism    Hypertension    Mixed hyperlipidemia    Morbid obesity    Anxiety       ALLERGIES AND MEDICATIONS: updated and reviewed.  Review of patient's allergies indicates:  No Known Allergies  Current Outpatient Medications   Medication Sig    albuterol (PROAIR HFA) 90 mcg/actuation inhaler Inhale 2 puffs into the lungs every 6 (six) hours as needed for Wheezing.    busPIRone (BUSPAR) 15 MG tablet Take 1 tablet (15 mg total) by mouth 3 (three) times daily.    chlorhexidine (PERIDEX) 0.12 % solution swish AND SPIT FIFTEEN MILLILITERS BY MOUTH TWICE DAILY    citalopram (CELEXA) 40 MG tablet TAKE 1 TABLET(40 MG) BY MOUTH EVERY DAY    clotrimazole-betamethasone 1-0.05% (LOTRISONE) cream Apply topically 2 (two) times daily.    divalproex (DEPAKOTE) 250 MG EC tablet 1 pill in morning, 1 pill in evening (Patient taking differently: Take 500 mg by mouth 2 (two) times daily.)    fenofibrate 160 MG Tab Take 160 mg by mouth once daily.    gabapentin (NEURONTIN) 300 MG capsule Take 2 capsules (600 mg total) by mouth once daily.    hydrOXYzine HCL (ATARAX) 25 MG tablet Take 1 tablet (25 mg total) by mouth 3 (three) times daily as needed for Anxiety.    hydrOXYzine pamoate (VISTARIL) 25 MG Cap 1 pill 3x a day for 2 days, rest left over every 8 hours as needed for migraine (Patient taking differently: Take 25 mg by mouth once daily. 1 pill 3x a day for 2 days, rest left over  every 8 hours as needed for migraine)    hydrOXYzine pamoate (VISTARIL) 25 MG Cap Take 1 capsule (25 mg total) by mouth 3 (three) times daily as needed.    lidocaine HCl 3 % Crea APPLY 2 grams to the painful area THREE TIMES A DAY    metoprolol succinate (TOPROL-XL) 25 MG 24 hr tablet Take 25 mg by mouth 2 (two) times daily.     midodrine (PROAMATINE) 2.5 MG Tab Take 2.5 mg by mouth 2 (two) times daily with meals.     mupirocin (BACTROBAN) 2 % ointment 2 (two) times daily.    nitroGLYCERIN (NITROSTAT) 0.4 MG SL tablet Place 0.4 mg under the tongue every 5 (five) minutes as needed for Chest pain.    NON FORMULARY MEDICATION Apply topically 4 (four) times daily as needed. Gabapentin 5%, Orphenadrine 2%, Lidocaine 2%, Prilocaine 2%    omega-3 acid ethyl esters (LOVAZA) 1 gram capsule Take 2 capsules by mouth 2 (two) times daily.    oxybutynin (DITROPAN-XL) 10 MG 24 hr tablet Take 1 tablet (10 mg total) by mouth 2 (two) times daily.    oxycodone-acetaminophen (PERCOCET)  mg per tablet Take 1 tablet by mouth every 4 (four) hours as needed for Pain.    pravastatin (PRAVACHOL) 80 MG tablet Take 80 mg by mouth once daily.    RANEXA 1,000 mg Tb12 Take 1,000 mg by mouth 2 (two) times daily.    RESTASIS 0.05 % ophthalmic emulsion Place 1 drop into both eyes 2 (two) times daily.    tamsulosin (FLOMAX) 0.4 mg Cap Take 2 capsules (0.8 mg total) by mouth once daily.    tizanidine (ZANAFLEX) 4 MG tablet ONE TABLET BY MOUTH IN THE EVENING    traZODone (DESYREL) 150 MG tablet Take 1 tablet (150 mg total) by mouth every evening.    XARELTO 15 mg Tab ONE TABLET BY MOUTH TWICE DAILY FOR 21 DAYS     No current facility-administered medications for this visit.       Review of Systems   Constitutional:  Negative for activity change, fatigue, fever and unexpected weight change.   HENT:  Negative for congestion.    Eyes:  Negative for redness.   Respiratory:  Negative for chest tightness and shortness of breath.    Cardiovascular:   Negative for chest pain and leg swelling.   Gastrointestinal:  Negative for abdominal pain, constipation, diarrhea, nausea and vomiting.   Genitourinary:  Positive for testicular pain and urgency. Negative for dysuria, flank pain, frequency, hematuria, penile pain, penile swelling and scrotal swelling.   Musculoskeletal:  Negative for arthralgias and back pain.   Neurological:  Negative for dizziness and light-headedness.   Psychiatric/Behavioral:  Negative for behavioral problems and confusion. The patient is not nervous/anxious.    All other systems reviewed and are negative.      Objective:      There were no vitals filed for this visit.              Physical Exam  Vitals and nursing note reviewed.   Constitutional:       Appearance: He is well-developed.   HENT:      Head: Normocephalic.   Eyes:      Conjunctiva/sclera: Conjunctivae normal.   Neck:      Thyroid: No thyromegaly.      Trachea: No tracheal deviation.   Cardiovascular:      Rate and Rhythm: Normal rate.      Heart sounds: Normal heart sounds.   Pulmonary:      Effort: Pulmonary effort is normal. No respiratory distress.      Breath sounds: Normal breath sounds. No wheezing.   Abdominal:      General: Bowel sounds are normal.      Palpations: Abdomen is soft.      Tenderness: There is no abdominal tenderness. There is no rebound.      Hernia: No hernia is present.   Musculoskeletal:         General: No tenderness. Normal range of motion.      Cervical back: Normal range of motion and neck supple.   Lymphadenopathy:      Cervical: No cervical adenopathy.   Skin:     General: Skin is warm and dry.      Findings: No erythema or rash.   Neurological:      Mental Status: He is alert and oriented to person, place, and time.   Psychiatric:         Behavior: Behavior normal.         Thought Content: Thought content normal.         Judgment: Judgment normal.         Urine dipstick shows negative for all components.  Micro exam: negative for WBC's or  RBC's.    Component Ref Range & Units 10 d ago 1 yr ago 2 yr ago 3 yr ago 4 yr ago 6 yr ago 8 yr ago   PSA Diagnostic 0.00 - 4.00 ng/mL 0.43  0.42 CM  0.39 CM  0.43 CM  0.33 CM  0.44 CM  0.35 CM    Comment: The testing method is a chemiluminescent microparticle immunoassay   manufactured by [employer] and performed on the [employer]   or   [employer] system. Values obtained with different assay manufacturers   for   methods may be different and cannot be used interchangeably.   PSA Expected levels:   Hormonal Therapy: <0.05 ng/ml   Prostatectomy: <0.01 ng/ml   Radiation Therapy: <1.00 ng/ml    Resulting Agency  [facility]              Specimen Collected: 09/08/23 12:21 Last Resulted: 09/08/23 22:03                9/2021  [facility] Scrotal US  Good blood flow, bilaterally  Right 4.6 cm spermatocele    4/19/2022  Good blood flow bilaterally  Right 4.7 cm spermatocele    3/2023  Good Blood flow  Left epididymal head cyst 1.7 cm  Right epididymal head cyst 5.2 cm          Assessment:       1. Urinary urgency    2. BPH without obstruction/lower urinary tract symptoms    3. Urinary frequency    4. Pain in both testicles                      Plan:       1. Urinary urgency    - oxybutynin (DITROPAN-XL) 10 MG 24 hr tablet; Take 1 tablet (10 mg total) by mouth 2 (two) times daily.  Dispense: 180 tablet; Refill: 3  - Prostate Specific Antigen, Diagnostic; Future    2. BPH without obstruction/lower urinary tract symptoms    - tamsulosin (FLOMAX) 0.4 mg Cap; Take 2 capsules (0.8 mg total) by mouth once daily.  Dispense: 180 capsule; Refill: 3    3. Urinary frequency      4. Pain in both testicles    - US Scrotum And Testicles; Future  - US Scrotum And Testicles       No follow-ups on file.

## 2024-04-10 ENCOUNTER — OFFICE VISIT (OUTPATIENT)
Dept: PSYCHIATRY | Facility: CLINIC | Age: 64
End: 2024-04-10
Payer: MEDICARE

## 2024-04-10 VITALS
HEART RATE: 88 BPM | DIASTOLIC BLOOD PRESSURE: 69 MMHG | BODY MASS INDEX: 31.03 KG/M2 | SYSTOLIC BLOOD PRESSURE: 129 MMHG | WEIGHT: 210.13 LBS

## 2024-04-10 DIAGNOSIS — F41.9 ANXIETY: Primary | ICD-10-CM

## 2024-04-10 PROCEDURE — 99214 OFFICE O/P EST MOD 30 MIN: CPT | Mod: S$PBB,,, | Performed by: PSYCHIATRY & NEUROLOGY

## 2024-04-10 PROCEDURE — 99211 OFF/OP EST MAY X REQ PHY/QHP: CPT | Mod: PBBFAC | Performed by: PSYCHIATRY & NEUROLOGY

## 2024-04-10 PROCEDURE — 99999 PR PBB SHADOW E&M-EST. PATIENT-LVL I: CPT | Mod: PBBFAC,,, | Performed by: PSYCHIATRY & NEUROLOGY

## 2024-04-10 RX ORDER — HYDROXYZINE HYDROCHLORIDE 25 MG/1
25 TABLET, FILM COATED ORAL 3 TIMES DAILY PRN
Qty: 270 TABLET | Refills: 1 | Status: SHIPPED | OUTPATIENT
Start: 2024-04-10

## 2024-04-10 RX ORDER — TRAZODONE HYDROCHLORIDE 150 MG/1
150 TABLET ORAL NIGHTLY
Qty: 90 TABLET | Refills: 1 | Status: SHIPPED | OUTPATIENT
Start: 2024-04-10

## 2024-04-10 RX ORDER — BUSPIRONE HYDROCHLORIDE 15 MG/1
15 TABLET ORAL 3 TIMES DAILY
Qty: 270 TABLET | Refills: 1 | Status: SHIPPED | OUTPATIENT
Start: 2024-04-10

## 2024-04-10 RX ORDER — CITALOPRAM 40 MG/1
TABLET, FILM COATED ORAL
Qty: 90 TABLET | Refills: 1 | Status: SHIPPED | OUTPATIENT
Start: 2024-04-10

## 2024-04-10 NOTE — PROGRESS NOTES
ESTABLISHED OUTPATIENT VISIT   E/M LEVEL 4: [id number]    ENCOUNTER DATE: 4/10/2024  SITE: [facility], [address]    HISTORY    CHIEF COMPLAINT   Min Nunez is a 63 y.o. male who presents for follow up of anxiety/depression.    HPI    With patient's consent I spoke with the patient's wife during the initial part of today's visit. Patient has been depressed, he has not expressed.    Continues to struggle with multiple physical issues. Physical pain is troublesome.    Overall appears stable psychiatrically.    Satisfied with current psychotropic medication regimen.    Watches TV.    Psychiatric Review Of Systems - Is patient experiencing or having changes in:  sleep: varies  appetite: no  weight: no  energy/anergy: no  interest/pleasure/anhedonia: no  somatic symptoms: no  libido: no  anxiety/panic: anxiety at times  guilty/hopelessness: no  concentration: no  S.I.B.s/risky behavior: no  Irritability: no  Racing thoughts: no  Impulsive behaviors: no  Paranoia:no  AVH:no    Recent alcohol: no  Recent drug: no    Medical ROS   Multiple physical problems     PAST MEDICAL, FAMILY AND SOCIAL HISTORY: The patient's past medical, family and social history have been reviewed and updated as appropriate within the electronic medical record - see encounter notes.    PSYCHOTROPIC MEDICATIONS   Trazodone  mg at bedtime prn, Buspar 15 mg tid, Celexa 40 mg qam, Depakote 500 mg bid[for headaches], Hydroxyzine prn for migraines[takes 25 mg bid-tid]    Scheduled and PRN Medications     Current Outpatient Medications:     albuterol (PROAIR HFA) 90 mcg/actuation inhaler, Inhale 2 puffs into the lungs every 6 (six) hours as needed for Wheezing., Disp: 18 g, Rfl: 0    busPIRone (BUSPAR) 15 MG tablet, Take 1 tablet (15 mg total) by mouth 3 (three) times daily., Disp: 270 tablet, Rfl: 1    chlorhexidine (PERIDEX) 0.12 % solution, swish AND SPIT FIFTEEN MILLILITERS BY MOUTH TWICE DAILY, Disp: , Rfl: 0    citalopram  (CELEXA) 40 MG tablet, TAKE 1 TABLET(40 MG) BY MOUTH EVERY DAY, Disp: 90 tablet, Rfl: 1    clotrimazole-betamethasone 1-0.05% (LOTRISONE) cream, Apply topically 2 (two) times daily., Disp: 45 g, Rfl: 1    divalproex (DEPAKOTE) 250 MG EC tablet, 1 pill in morning, 1 pill in evening (Patient taking differently: Take 500 mg by mouth 2 (two) times daily.), Disp: 60 tablet, Rfl: 6    fenofibrate 160 MG Tab, Take 160 mg by mouth once daily., Disp: , Rfl:     gabapentin (NEURONTIN) 300 MG capsule, Take 2 capsules (600 mg total) by mouth once daily., Disp: 180 capsule, Rfl: 11    hydrOXYzine HCL (ATARAX) 25 MG tablet, Take 1 tablet (25 mg total) by mouth 3 (three) times daily as needed for Anxiety., Disp: 270 tablet, Rfl: 1    hydrOXYzine pamoate (VISTARIL) 25 MG Cap, 1 pill 3x a day for 2 days, rest left over every 8 hours as needed for migraine (Patient taking differently: Take 25 mg by mouth once daily. 1 pill 3x a day for 2 days, rest left over every 8 hours as needed for migraine), Disp: 15 capsule, Rfl: 3    hydrOXYzine pamoate (VISTARIL) 25 MG Cap, Take 1 capsule (25 mg total) by mouth 3 (three) times daily as needed., Disp: 30 capsule, Rfl: 3    lidocaine HCl 3 % Crea, APPLY 2 grams to the painful area THREE TIMES A DAY, Disp: , Rfl: 3    metoprolol succinate (TOPROL-XL) 25 MG 24 hr tablet, Take 25 mg by mouth 2 (two) times daily. , Disp: , Rfl:     midodrine (PROAMATINE) 2.5 MG Tab, Take 2.5 mg by mouth 2 (two) times daily with meals. , Disp: , Rfl:     mupirocin (BACTROBAN) 2 % ointment, 2 (two) times daily., Disp: , Rfl: 0    nitroGLYCERIN (NITROSTAT) 0.4 MG SL tablet, Place 0.4 mg under the tongue every 5 (five) minutes as needed for Chest pain., Disp: , Rfl:     NON FORMULARY MEDICATION, Apply topically 4 (four) times daily as needed. Gabapentin 5%, Orphenadrine 2%, Lidocaine 2%, Prilocaine 2%, Disp: , Rfl:     omega-3 acid ethyl esters (LOVAZA) 1 gram capsule, Take 2 capsules by mouth 2 (two) times daily.,  Disp: , Rfl: 3    oxybutynin (DITROPAN-XL) 10 MG 24 hr tablet, Take 1 tablet (10 mg total) by mouth 2 (two) times daily., Disp: 180 tablet, Rfl: 3    oxycodone-acetaminophen (PERCOCET)  mg per tablet, Take 1 tablet by mouth every 4 (four) hours as needed for Pain., Disp: 30 tablet, Rfl: 0    pravastatin (PRAVACHOL) 80 MG tablet, Take 80 mg by mouth once daily., Disp: , Rfl: 4    RANEXA 1,000 mg Tb12, Take 1,000 mg by mouth 2 (two) times daily., Disp: , Rfl: 3    RESTASIS 0.05 % ophthalmic emulsion, Place 1 drop into both eyes 2 (two) times daily., Disp: , Rfl: 4    tamsulosin (FLOMAX) 0.4 mg Cap, Take 2 capsules (0.8 mg total) by mouth once daily., Disp: 180 capsule, Rfl: 3    tizanidine (ZANAFLEX) 4 MG tablet, ONE TABLET BY MOUTH IN THE EVENING, Disp: , Rfl: 2    traZODone (DESYREL) 150 MG tablet, Take 1 tablet (150 mg total) by mouth every evening., Disp: 90 tablet, Rfl: 1    XARELTO 15 mg Tab, ONE TABLET BY MOUTH TWICE DAILY FOR 21 DAYS, Disp: , Rfl: 0    EXAMINATION    Vitals:    04/10/24 1045   BP: 129/69   Pulse: 88     Pt. States, that BP fluctuates.    CONSTITUTIONAL  General Appearance: well nourished    MUSCULOSKELETAL  Muscle Strength and Tone: normal strength and tone  Abnormal Involuntary Movements: no abnormal movement noted  Gait and Station: normal gait    PSYCHIATRIC   Level of Consciousness: alert  Orientation: oriented to person, place and time  Grooming: well groomed  Psychomotor Behavior: no restlessness/agitation  Speech: normal in rate, rhythm and volume  Language: normal vocabulary  Mood: anxious at times  Affect: full range and appropriate  Thought Process: logical and goal directed  Associations: intact associations  Thought Content: no SI/HI  Memory: grossly intact  Attention: intact to content of interview  Fund of Knowledge: appears adequate  Insight: good  Judgement: good    MEDICAL DECISION MAKING    DIAGNOSES  Anxiety d/o, unspecified    PROBLEM LIST AND MANAGEMENT PLANS  -  anxiety:   Continue Celexa, Buspar, prn Trazodone, and prn Hydroxyzine as above  - rtc 3 months     Time with patient: 30 min    LABORATORY DATA  No visits with results within 3 Month(s) from this visit.   Latest known visit with results is:   Lab Visit on 09/08/2023   Component Date Value Ref Range Status    PSA Diagnostic 09/08/2023 0.43  0.00 - 4.00 ng/mL Final    Comment: The testing method is a chemiluminescent microparticle immunoassay   manufactured by [employer] and performed on the [employer]   or   [employer] system. Values obtained with different assay manufacturers   for   methods may be different and cannot be used interchangeably.  PSA Expected levels:  Hormonal Therapy: <0.05 ng/ml  Prostatectomy: <0.01 ng/ml  Radiation Therapy: <1.00 ng/ml             Sampson Luke

## 2024-06-18 ENCOUNTER — LAB VISIT (OUTPATIENT)
Dept: LAB | Facility: HOSPITAL | Age: 64
End: 2024-06-18
Attending: UROLOGY
Payer: MEDICARE

## 2024-06-18 DIAGNOSIS — R39.15 URINARY URGENCY: ICD-10-CM

## 2024-06-18 LAB — COMPLEXED PSA SERPL-MCNC: 0.28 NG/ML (ref 0–4)

## 2024-06-18 PROCEDURE — 84153 ASSAY OF PSA TOTAL: CPT | Performed by: UROLOGY

## 2024-06-18 PROCEDURE — 36415 COLL VENOUS BLD VENIPUNCTURE: CPT | Mod: PO | Performed by: UROLOGY

## 2024-06-21 ENCOUNTER — OFFICE VISIT (OUTPATIENT)
Dept: UROLOGY | Facility: CLINIC | Age: 64
End: 2024-06-21
Payer: MEDICARE

## 2024-06-21 VITALS — WEIGHT: 226 LBS | BODY MASS INDEX: 33.37 KG/M2

## 2024-06-21 DIAGNOSIS — R35.0 URINARY FREQUENCY: ICD-10-CM

## 2024-06-21 DIAGNOSIS — N40.0 BPH WITHOUT OBSTRUCTION/LOWER URINARY TRACT SYMPTOMS: ICD-10-CM

## 2024-06-21 DIAGNOSIS — N50.812 PAIN IN BOTH TESTICLES: ICD-10-CM

## 2024-06-21 DIAGNOSIS — N50.811 PAIN IN BOTH TESTICLES: ICD-10-CM

## 2024-06-21 DIAGNOSIS — R39.15 URINARY URGENCY: Primary | ICD-10-CM

## 2024-06-21 PROCEDURE — 99999 PR PBB SHADOW E&M-EST. PATIENT-LVL IV: CPT | Mod: PBBFAC,,, | Performed by: UROLOGY

## 2024-06-21 PROCEDURE — 99214 OFFICE O/P EST MOD 30 MIN: CPT | Mod: PBBFAC | Performed by: UROLOGY

## 2024-06-21 RX ORDER — OXYBUTYNIN CHLORIDE 10 MG/1
10 TABLET, EXTENDED RELEASE ORAL 2 TIMES DAILY
Qty: 180 TABLET | Refills: 3 | Status: SHIPPED | OUTPATIENT
Start: 2024-06-21

## 2024-06-21 RX ORDER — TAMSULOSIN HYDROCHLORIDE 0.4 MG/1
0.8 CAPSULE ORAL DAILY
Qty: 180 CAPSULE | Refills: 3 | Status: SHIPPED | OUTPATIENT
Start: 2024-06-21

## 2024-06-21 NOTE — PROGRESS NOTES
Subjective:       Patient ID: Min Nunez is a 63 y.o. male The patient's last visit with me was on 12/18/2023.     Chief Complaint:   No chief complaint on file.        Orchalgia  He had a bilateral epididymectomy on 8/5/2016.  He continues to have right sided swelling and bilateral pain.  His right sided pain is worse than left.   He complains of pain with sitting, standing, getting up to stand, after roly movements, lifting, and walking.  He has difficulty walking or performing exercises.    10/20/2021   He continues to have pain, and it seems worse recently.   He denies any hematuria, dysuria, or trauma.      1/28/2022  His pain is about the same.  He is trying to avoid heavy lifting.     7/29/2022  He is about the same.  He had a heart attack recently.  He had an angioplasty.    10/28/2022  His pain is about the same.  No new issues.    6/16/2023  The orchalgia is about the same.  He does not want any surgery.  He is back with an ultrasound.    12/18/2023  He feels about the same.  He has some neck pain and scrotal pain.    06/21/2024  He recently had a epidural, however, it was not helpful.        Urinary frequency and Urgency Incontinence  He has some frequency and urgency that is managed with Ditropan XL 10 mg and Myrbetriq 25 mg and Flomax.  Vesicare is no longer covered.      1/28/2022  IPSS Questionnaire (AUA-7):18/4    7/29/2022  IPSS Questionnaire (AUA-7):  19/5    10/28/2022  He is doing okay with Oxybutynin XL 10 mg BID, and Flomax BID.  He has noted some penile irritation after intercourse, he has been using his wife's estradiol cream.    IPSS Questionnaire (AUA-7):  17/3    6/16/2023    He is taking Flomax and Oxybutynin.  Myrbetriq is too expensive.  IPSS Questionnaire (AUA-7):  24/3    12/18/2023  He is concerned about having kidney stones.  His brother recently had issues with stones.  He has occasional back pain.  He is taking Flomax and Oxybutynin.    06/21/2024  He is still voiding  frequently.  He is taking Flomax and Oxybutynin.      ACTIVE MEDICAL ISSUES:  Patient Active Problem List   Diagnosis    Chest pain, exertional    Coronary artery disease, occlusive    Coronary atherosclerosis    Orchalgia    Dizziness    Syncope    Abnormal brain MRI    Migraines    Atypical migraine    HA (headache)    Numbness    Facial droop    Faintness    Groin pain    Constipation    Hypotension    Urinary urgency    Nocturia more than twice per night    Pneumonia of right lower lobe due to infectious organism    Hypertension    Mixed hyperlipidemia    Morbid obesity    Anxiety       ALLERGIES AND MEDICATIONS: updated and reviewed.  Review of patient's allergies indicates:  No Known Allergies  Current Outpatient Medications   Medication Sig    albuterol (PROAIR HFA) 90 mcg/actuation inhaler Inhale 2 puffs into the lungs every 6 (six) hours as needed for Wheezing.    busPIRone (BUSPAR) 15 MG tablet Take 1 tablet (15 mg total) by mouth 3 (three) times daily.    chlorhexidine (PERIDEX) 0.12 % solution swish AND SPIT FIFTEEN MILLILITERS BY MOUTH TWICE DAILY    citalopram (CELEXA) 40 MG tablet TAKE 1 TABLET(40 MG) BY MOUTH EVERY DAY    clotrimazole-betamethasone 1-0.05% (LOTRISONE) cream Apply topically 2 (two) times daily.    divalproex (DEPAKOTE) 250 MG EC tablet 1 pill in morning, 1 pill in evening (Patient taking differently: Take 500 mg by mouth 2 (two) times daily.)    fenofibrate 160 MG Tab Take 160 mg by mouth once daily.    gabapentin (NEURONTIN) 300 MG capsule Take 2 capsules (600 mg total) by mouth once daily.    hydrOXYzine HCL (ATARAX) 25 MG tablet Take 1 tablet (25 mg total) by mouth 3 (three) times daily as needed for Anxiety.    hydrOXYzine pamoate (VISTARIL) 25 MG Cap 1 pill 3x a day for 2 days, rest left over every 8 hours as needed for migraine (Patient taking differently: Take 25 mg by mouth once daily. 1 pill 3x a day for 2 days, rest left over every 8 hours as needed for migraine)     hydrOXYzine pamoate (VISTARIL) 25 MG Cap Take 1 capsule (25 mg total) by mouth 3 (three) times daily as needed.    lidocaine HCl 3 % Crea APPLY 2 grams to the painful area THREE TIMES A DAY    metoprolol succinate (TOPROL-XL) 25 MG 24 hr tablet Take 25 mg by mouth 2 (two) times daily.     midodrine (PROAMATINE) 2.5 MG Tab Take 2.5 mg by mouth 2 (two) times daily with meals.     mupirocin (BACTROBAN) 2 % ointment 2 (two) times daily.    nitroGLYCERIN (NITROSTAT) 0.4 MG SL tablet Place 0.4 mg under the tongue every 5 (five) minutes as needed for Chest pain.    NON FORMULARY MEDICATION Apply topically 4 (four) times daily as needed. Gabapentin 5%, Orphenadrine 2%, Lidocaine 2%, Prilocaine 2%    omega-3 acid ethyl esters (LOVAZA) 1 gram capsule Take 2 capsules by mouth 2 (two) times daily.    oxybutynin (DITROPAN-XL) 10 MG 24 hr tablet Take 1 tablet (10 mg total) by mouth 2 (two) times daily.    oxycodone-acetaminophen (PERCOCET)  mg per tablet Take 1 tablet by mouth every 4 (four) hours as needed for Pain.    pravastatin (PRAVACHOL) 80 MG tablet Take 80 mg by mouth once daily.    RANEXA 1,000 mg Tb12 Take 1,000 mg by mouth 2 (two) times daily.    RESTASIS 0.05 % ophthalmic emulsion Place 1 drop into both eyes 2 (two) times daily.    tamsulosin (FLOMAX) 0.4 mg Cap Take 2 capsules (0.8 mg total) by mouth once daily.    tizanidine (ZANAFLEX) 4 MG tablet ONE TABLET BY MOUTH IN THE EVENING    traZODone (DESYREL) 150 MG tablet Take 1 tablet (150 mg total) by mouth every evening.    XARELTO 15 mg Tab ONE TABLET BY MOUTH TWICE DAILY FOR 21 DAYS     No current facility-administered medications for this visit.       Review of Systems   Constitutional:  Negative for activity change, fatigue, fever and unexpected weight change.   HENT:  Negative for congestion.    Eyes:  Negative for redness.   Respiratory:  Negative for chest tightness and shortness of breath.    Cardiovascular:  Negative for chest pain and leg swelling.    Gastrointestinal:  Negative for abdominal pain, constipation, diarrhea, nausea and vomiting.   Genitourinary:  Positive for testicular pain and urgency. Negative for dysuria, flank pain, frequency, hematuria, penile pain, penile swelling and scrotal swelling.   Musculoskeletal:  Negative for arthralgias and back pain.   Neurological:  Negative for dizziness and light-headedness.   Psychiatric/Behavioral:  Negative for behavioral problems and confusion. The patient is not nervous/anxious.    All other systems reviewed and are negative.      Objective:      Vitals:    06/21/24 1400   Weight: 102.5 kg (225 lb 15.5 oz)                 Physical Exam  Vitals and nursing note reviewed.   Constitutional:       Appearance: He is well-developed.   HENT:      Head: Normocephalic.   Eyes:      Conjunctiva/sclera: Conjunctivae normal.   Neck:      Thyroid: No thyromegaly.      Trachea: No tracheal deviation.   Cardiovascular:      Rate and Rhythm: Normal rate.      Heart sounds: Normal heart sounds.   Pulmonary:      Effort: Pulmonary effort is normal. No respiratory distress.      Breath sounds: Normal breath sounds. No wheezing.   Abdominal:      General: Bowel sounds are normal.      Palpations: Abdomen is soft.      Tenderness: There is no abdominal tenderness. There is no rebound.      Hernia: No hernia is present.   Musculoskeletal:         General: No tenderness. Normal range of motion.      Cervical back: Normal range of motion and neck supple.   Lymphadenopathy:      Cervical: No cervical adenopathy.   Skin:     General: Skin is warm and dry.      Findings: No erythema or rash.   Neurological:      Mental Status: He is alert and oriented to person, place, and time.   Psychiatric:         Behavior: Behavior normal.         Thought Content: Thought content normal.         Judgment: Judgment normal.         Urine dipstick shows negative for all components.  Micro exam: negative for WBC's or RBC's.    Component Ref Range  & Units 3 d ago 9 mo ago 1 yr ago 3 yr ago 4 yr ago 5 yr ago 6 yr ago   PSA Diagnostic 0.00 - 4.00 ng/mL 0.28 0.43 CM 0.42 CM 0.39 CM 0.43 CM 0.33 CM 0.44 CM   Comment: The testing method is a chemiluminescent microparticle immunoassay  manufactured by [employer] and performed on the   or  [employer] system. Values obtained with different assay manufacturers  for  methods may be different and cannot be used interchangeably.  PSA Expected levels:  Hormonal Therapy: <0.05 ng/ml  Prostatectomy: <0.01 ng/ml  Radiation Therapy: <1.00 ng/ml   Resulting Agency  [facility]              Narrative  Performed by: [facility]  PSA 3 months      Specimen Collected: 06/18/24 09:25 CDT               9/2021  [facility] Scrotal US  Good blood flow, bilaterally  Right 4.6 cm spermatocele    4/19/2022  Good blood flow bilaterally  Right 4.7 cm spermatocele    3/2023  Good Blood flow  Left epididymal head cyst 1.7 cm  Right epididymal head cyst 5.2 cm    6/2024  Good Flow  Left sided cyst 0.6, rete testis  Right 4.4 cm            Assessment:       1. Urinary urgency    2. BPH without obstruction/lower urinary tract symptoms    3. Urinary frequency    4. Pain in both testicles                        Plan:       1. Urinary urgency    - oxybutynin (DITROPAN-XL) 10 MG 24 hr tablet; Take 1 tablet (10 mg total) by mouth 2 (two) times daily.  Dispense: 180 tablet; Refill: 3    2. BPH without obstruction/lower urinary tract symptoms    - tamsulosin (FLOMAX) 0.4 mg Cap; Take 2 capsules (0.8 mg total) by mouth once daily.  Dispense: 180 capsule; Refill: 3    3. Urinary frequency      4. Pain in both testicles  MARCIA in a year       Follow up in about 3 months (around 9/21/2024) for Follow up Established.

## 2024-06-24 ENCOUNTER — TELEPHONE (OUTPATIENT)
Dept: UROLOGY | Facility: CLINIC | Age: 64
End: 2024-06-24
Payer: MEDICARE

## 2024-06-24 NOTE — TELEPHONE ENCOUNTER
[facility] contacted results will be faxed over today.  ----- Message from Boubacar Hendrickson MD sent at 6/21/2024  8:24 AM CDT -----  Please check with [facility] for a scrotal ultrasound

## 2024-07-03 ENCOUNTER — OFFICE VISIT (OUTPATIENT)
Dept: PSYCHIATRY | Facility: CLINIC | Age: 64
End: 2024-07-03
Payer: MEDICARE

## 2024-07-03 VITALS
WEIGHT: 223.56 LBS | DIASTOLIC BLOOD PRESSURE: 68 MMHG | SYSTOLIC BLOOD PRESSURE: 123 MMHG | BODY MASS INDEX: 33.01 KG/M2 | HEART RATE: 97 BPM

## 2024-07-03 DIAGNOSIS — F41.9 ANXIETY: Primary | ICD-10-CM

## 2024-07-03 PROCEDURE — 99212 OFFICE O/P EST SF 10 MIN: CPT | Mod: PBBFAC | Performed by: PSYCHIATRY & NEUROLOGY

## 2024-07-03 PROCEDURE — 99999 PR PBB SHADOW E&M-EST. PATIENT-LVL II: CPT | Mod: PBBFAC,,, | Performed by: PSYCHIATRY & NEUROLOGY

## 2024-07-03 PROCEDURE — 99214 OFFICE O/P EST MOD 30 MIN: CPT | Mod: S$PBB,,, | Performed by: PSYCHIATRY & NEUROLOGY

## 2024-07-03 RX ORDER — HYDROXYZINE HYDROCHLORIDE 25 MG/1
25 TABLET, FILM COATED ORAL 3 TIMES DAILY PRN
Qty: 270 TABLET | Refills: 1 | Status: SHIPPED | OUTPATIENT
Start: 2024-07-03

## 2024-07-03 RX ORDER — CITALOPRAM 40 MG/1
TABLET, FILM COATED ORAL
Qty: 90 TABLET | Refills: 1 | Status: SHIPPED | OUTPATIENT
Start: 2024-07-03

## 2024-07-03 RX ORDER — TRAZODONE HYDROCHLORIDE 150 MG/1
150 TABLET ORAL NIGHTLY PRN
Qty: 90 TABLET | Refills: 0 | Status: SHIPPED | OUTPATIENT
Start: 2024-07-03

## 2024-07-03 RX ORDER — BUSPIRONE HYDROCHLORIDE 15 MG/1
15 TABLET ORAL 3 TIMES DAILY
Qty: 270 TABLET | Refills: 1 | Status: SHIPPED | OUTPATIENT
Start: 2024-07-03

## 2024-07-03 NOTE — PROGRESS NOTES
ESTABLISHED OUTPATIENT VISIT   E/M LEVEL 4: 11220    ENCOUNTER DATE: 7/3/2024  SITE: [facility], [address]    HISTORY    CHIEF COMPLAINT   Min Nunez is a 63 y.o. male who presents for follow up of anxiety/depression.    HPI    I spoke with wife Michelle on speaker phone during today's visit.    Continues to struggle with multiple physical issues. Physical pain remains troublesome.    Overall appears stable psychiatrically.    Satisfied with current psychotropic medication regimen.    Watches TV.    Psychiatric Review Of Systems - Is patient experiencing or having changes in:  sleep: varies  appetite: no  weight: no  energy/anergy: no  interest/pleasure/anhedonia: no  somatic symptoms: no  libido: no  anxiety/panic: anxiety at times  guilty/hopelessness: no  concentration: no  S.I.B.s/risky behavior: no  Irritability: no  Racing thoughts: no  Impulsive behaviors: no  Paranoia:no  AVH:no    Recent alcohol: no  Recent drug: no    Medical ROS   Multiple physical problems     PAST MEDICAL, FAMILY AND SOCIAL HISTORY: The patient's past medical, family and social history have been reviewed and updated as appropriate within the electronic medical record - see encounter notes.    PSYCHOTROPIC MEDICATIONS   Trazodone  mg at bedtime prn, Buspar 15 mg tid, Celexa 40 mg qam, Depakote 500 mg bid[for headaches], Hydroxyzine prn for migraines[takes 25 mg bid-tid]    Scheduled and PRN Medications     Current Outpatient Medications:     albuterol (PROAIR HFA) 90 mcg/actuation inhaler, Inhale 2 puffs into the lungs every 6 (six) hours as needed for Wheezing., Disp: 18 g, Rfl: 0    busPIRone (BUSPAR) 15 MG tablet, Take 1 tablet (15 mg total) by mouth 3 (three) times daily., Disp: 270 tablet, Rfl: 1    chlorhexidine (PERIDEX) 0.12 % solution, swish AND SPIT FIFTEEN MILLILITERS BY MOUTH TWICE DAILY, Disp: , Rfl: 0    citalopram (CELEXA) 40 MG tablet, TAKE 1 TABLET(40 MG) BY MOUTH EVERY DAY, Disp: 90 tablet,  Rfl: 1    clotrimazole-betamethasone 1-0.05% (LOTRISONE) cream, Apply topically 2 (two) times daily., Disp: 45 g, Rfl: 1    divalproex (DEPAKOTE) 250 MG EC tablet, 1 pill in morning, 1 pill in evening (Patient taking differently: Take 500 mg by mouth 2 (two) times daily.), Disp: 60 tablet, Rfl: 6    fenofibrate 160 MG Tab, Take 160 mg by mouth once daily., Disp: , Rfl:     gabapentin (NEURONTIN) 300 MG capsule, Take 2 capsules (600 mg total) by mouth once daily., Disp: 180 capsule, Rfl: 11    hydrOXYzine HCL (ATARAX) 25 MG tablet, Take 1 tablet (25 mg total) by mouth 3 (three) times daily as needed for Anxiety., Disp: 270 tablet, Rfl: 1    hydrOXYzine pamoate (VISTARIL) 25 MG Cap, 1 pill 3x a day for 2 days, rest left over every 8 hours as needed for migraine (Patient taking differently: Take 25 mg by mouth once daily. 1 pill 3x a day for 2 days, rest left over every 8 hours as needed for migraine), Disp: 15 capsule, Rfl: 3    hydrOXYzine pamoate (VISTARIL) 25 MG Cap, Take 1 capsule (25 mg total) by mouth 3 (three) times daily as needed., Disp: 30 capsule, Rfl: 3    lidocaine HCl 3 % Crea, APPLY 2 grams to the painful area THREE TIMES A DAY, Disp: , Rfl: 3    metoprolol succinate (TOPROL-XL) 25 MG 24 hr tablet, Take 25 mg by mouth 2 (two) times daily. , Disp: , Rfl:     midodrine (PROAMATINE) 2.5 MG Tab, Take 2.5 mg by mouth 2 (two) times daily with meals. , Disp: , Rfl:     mupirocin (BACTROBAN) 2 % ointment, 2 (two) times daily., Disp: , Rfl: 0    nitroGLYCERIN (NITROSTAT) 0.4 MG SL tablet, Place 0.4 mg under the tongue every 5 (five) minutes as needed for Chest pain., Disp: , Rfl:     NON FORMULARY MEDICATION, Apply topically 4 (four) times daily as needed. Gabapentin 5%, Orphenadrine 2%, Lidocaine 2%, Prilocaine 2%, Disp: , Rfl:     omega-3 acid ethyl esters (LOVAZA) 1 gram capsule, Take 2 capsules by mouth 2 (two) times daily., Disp: , Rfl: 3    oxybutynin (DITROPAN-XL) 10 MG 24 hr tablet, Take 1 tablet (10 mg  total) by mouth 2 (two) times daily., Disp: 180 tablet, Rfl: 3    oxycodone-acetaminophen (PERCOCET)  mg per tablet, Take 1 tablet by mouth every 4 (four) hours as needed for Pain., Disp: 30 tablet, Rfl: 0    pravastatin (PRAVACHOL) 80 MG tablet, Take 80 mg by mouth once daily., Disp: , Rfl: 4    RANEXA 1,000 mg Tb12, Take 1,000 mg by mouth 2 (two) times daily., Disp: , Rfl: 3    RESTASIS 0.05 % ophthalmic emulsion, Place 1 drop into both eyes 2 (two) times daily., Disp: , Rfl: 4    tamsulosin (FLOMAX) 0.4 mg Cap, Take 2 capsules (0.8 mg total) by mouth once daily., Disp: 180 capsule, Rfl: 3    tizanidine (ZANAFLEX) 4 MG tablet, ONE TABLET BY MOUTH IN THE EVENING, Disp: , Rfl: 2    traZODone (DESYREL) 150 MG tablet, Take 1 tablet (150 mg total) by mouth every evening., Disp: 90 tablet, Rfl: 1    XARELTO 15 mg Tab, ONE TABLET BY MOUTH TWICE DAILY FOR 21 DAYS, Disp: , Rfl: 0    EXAMINATION    Vitals:    07/03/24 1100   BP: 123/68   Pulse: 97     CONSTITUTIONAL  General Appearance: well nourished    MUSCULOSKELETAL  Muscle Strength and Tone: normal strength and tone  Abnormal Involuntary Movements: no abnormal movement noted  Gait and Station: normal gait    PSYCHIATRIC   Level of Consciousness: alert  Orientation: oriented to person, place and time  Grooming: well groomed  Psychomotor Behavior: no restlessness/agitation  Speech: normal in rate, rhythm and volume  Language: normal vocabulary  Mood: anxious at times  Affect: full range and appropriate  Thought Process: logical and goal directed  Associations: intact associations  Thought Content: no SI/HI  Memory: grossly intact  Attention: intact to content of interview  Fund of Knowledge: appears adequate  Insight: good  Judgement: good    MEDICAL DECISION MAKING    DIAGNOSES  Anxiety d/o, unspecified    PROBLEM LIST AND MANAGEMENT PLANS  - anxiety:   Continue Celexa, Buspar, prn Trazodone, and prn Hydroxyzine as above  - rtc 3 months     Time with patient: 30  min    LABORATORY DATA  Lab Visit on 06/18/2024   Component Date Value Ref Range Status    PSA Diagnostic 06/18/2024 0.28  0.00 - 4.00 ng/mL Final    Comment: The testing method is a chemiluminescent microparticle immunoassay   manufactured by [employer] and performed on the "[employer]   or   [employer] system. Values obtained with different assay manufacturers   for   methods may be different and cannot be used interchangeably.  PSA Expected levels:  Hormonal Therapy: <0.05 ng/ml  Prostatectomy: <0.01 ng/ml  Radiation Therapy: <1.00 ng/ml             Sampson Luke

## 2024-08-12 DIAGNOSIS — N48.89 PENILE IRRITATION: ICD-10-CM

## 2024-08-12 RX ORDER — CLOTRIMAZOLE AND BETAMETHASONE DIPROPIONATE 10; .64 MG/G; MG/G
CREAM TOPICAL 2 TIMES DAILY
Qty: 45 G | Refills: 1 | Status: SHIPPED | OUTPATIENT
Start: 2024-08-12

## 2024-08-12 NOTE — TELEPHONE ENCOUNTER
----- Message from Yvonne Quesada sent at 8/12/2024  4:04 PM CDT -----  Regarding: Self   Type: RX Refill Request    Who Called: Self     Have you contacted your pharmacy: yes     Refill    RX Name and Strength:clotrimazole-betamethasone 1-0.05% (LOTRISONE) cream      Preferred Pharmacy with phone number: .  [facility] [id number] [place] [id number]  Phone: [phone] Fax: [phone]        Local or Mail Order: local     Would the patient rather a call back or a response via [facility]? Call back     Best Call Back Number:.[phone]      Additional Information:     Thank you.

## 2024-08-16 DIAGNOSIS — R39.15 URINARY URGENCY: ICD-10-CM

## 2024-08-16 RX ORDER — OXYBUTYNIN CHLORIDE 10 MG/1
10 TABLET, EXTENDED RELEASE ORAL 2 TIMES DAILY
Qty: 180 TABLET | Refills: 3 | Status: SHIPPED | OUTPATIENT
Start: 2024-08-16

## 2024-08-26 ENCOUNTER — NURSE TRIAGE (OUTPATIENT)
Dept: ADMINISTRATIVE | Facility: CLINIC | Age: 64
End: 2024-08-26
Payer: MEDICARE

## 2024-08-26 ENCOUNTER — HOSPITAL ENCOUNTER (EMERGENCY)
Facility: HOSPITAL | Age: 64
Discharge: HOME | End: 2024-08-26
Attending: EMERGENCY MEDICINE
Payer: MEDICARE

## 2024-08-26 VITALS
DIASTOLIC BLOOD PRESSURE: 80 MMHG | WEIGHT: 230 LBS | HEART RATE: 74 BPM | HEIGHT: 69 IN | OXYGEN SATURATION: 95 % | TEMPERATURE: 98 F | RESPIRATION RATE: 18 BRPM | SYSTOLIC BLOOD PRESSURE: 133 MMHG | BODY MASS INDEX: 34.07 KG/M2

## 2024-08-26 DIAGNOSIS — N50.3 EPIDIDYMAL CYST: Primary | ICD-10-CM

## 2024-08-26 DIAGNOSIS — I86.1 LEFT VARICOCELE: ICD-10-CM

## 2024-08-26 LAB
BACTERIA #/AREA URNS HPF: NORMAL /HPF
BILIRUB UR QL STRIP: NEGATIVE
CLARITY UR: CLEAR
COLOR UR: COLORLESS
GLUCOSE UR QL STRIP: ABNORMAL
HGB UR QL STRIP: NEGATIVE
KETONES UR QL STRIP: NEGATIVE
LEUKOCYTE ESTERASE UR QL STRIP: NEGATIVE
MICROSCOPIC COMMENT: NORMAL
NITRITE UR QL STRIP: NEGATIVE
PH UR STRIP: 6 [PH] (ref 5–8)
POCT GLUCOSE: 186 MG/DL (ref 70–110)
PROT UR QL STRIP: NEGATIVE
SP GR UR STRIP: >1.03 (ref 1–1.03)
URN SPEC COLLECT METH UR: ABNORMAL
UROBILINOGEN UR STRIP-ACNC: NEGATIVE EU/DL
YEAST URNS QL MICRO: NORMAL

## 2024-08-26 PROCEDURE — 81000 URINALYSIS NONAUTO W/SCOPE: CPT | Performed by: NURSE PRACTITIONER

## 2024-08-26 PROCEDURE — 82962 GLUCOSE BLOOD TEST: CPT

## 2024-08-26 PROCEDURE — 99285 EMERGENCY DEPT VISIT HI MDM: CPT | Mod: 25

## 2024-08-26 PROCEDURE — 99446 NTRPROF PH1/NTRNET/EHR 5-10: CPT | Mod: ,,, | Performed by: STUDENT IN AN ORGANIZED HEALTH CARE EDUCATION/TRAINING PROGRAM

## 2024-08-26 PROCEDURE — 25000003 PHARM REV CODE 250: Performed by: PHYSICIAN ASSISTANT

## 2024-08-26 PROCEDURE — 87591 N.GONORRHOEAE DNA AMP PROB: CPT | Performed by: NURSE PRACTITIONER

## 2024-08-26 PROCEDURE — 96372 THER/PROPH/DIAG INJ SC/IM: CPT | Performed by: PHYSICIAN ASSISTANT

## 2024-08-26 PROCEDURE — 87491 CHLMYD TRACH DNA AMP PROBE: CPT | Performed by: NURSE PRACTITIONER

## 2024-08-26 PROCEDURE — 63600175 PHARM REV CODE 636 W HCPCS: Performed by: PHYSICIAN ASSISTANT

## 2024-08-26 RX ORDER — HYDROMORPHONE HYDROCHLORIDE 1 MG/ML
1 INJECTION, SOLUTION INTRAMUSCULAR; INTRAVENOUS; SUBCUTANEOUS
Status: COMPLETED | OUTPATIENT
Start: 2024-08-26 | End: 2024-08-26

## 2024-08-26 RX ORDER — ONDANSETRON 4 MG/1
4 TABLET, ORALLY DISINTEGRATING ORAL
Status: COMPLETED | OUTPATIENT
Start: 2024-08-26 | End: 2024-08-26

## 2024-08-26 RX ADMIN — HYDROMORPHONE HYDROCHLORIDE 1 MG: 1 INJECTION, SOLUTION INTRAMUSCULAR; INTRAVENOUS; SUBCUTANEOUS at 02:08

## 2024-08-26 RX ADMIN — ONDANSETRON 4 MG: 4 TABLET, ORALLY DISINTEGRATING ORAL at 02:08

## 2024-08-26 NOTE — PROGRESS NOTES
[facility] - Emergency Dept  Response for E-Consult     Patient Name: Min Nunez  MRN: [id number]  Primary Care Provider: Boubacar Williamson MD (Inactive)   Requesting Provider: Yonny FRIEDMAN      Findings: Patient arrived to ED w/ scrotal pain. Urology consulted for assistance with management of scrotal US findings.   scrotal US reviewed no evidence of testicular torsion   no acute management indicated for varicocele  discussed with ED staff  scrotal elevation, NSAIDs if tolerated, ICE for comfort  Outpt follow up advised    I did communicate with the requesting provider in real time during the ED visit.     Total time of Consultation: 5-10 minutes    Percentage of time spent on written/verbal discussion: 100% written Secure Chat messaging, real time    *This eConsult is based on the clinical data available to me and is furnished without benefit of a physical examination. The eConsult will need to be interpreted in light of any clinical issues or changes in patient status not available to me at the time of filing this eConsults. Significant changes in patient condition or level of acuity should result in immediate formal consultation and reevaluation. Please alert me if you have further questions.    Thank you for your consult.     Krishna Swartz MD  [facility] - Emergency Dept

## 2024-08-26 NOTE — TELEPHONE ENCOUNTER
Call lost in transfer, attempted to call patient back, now speaking with patient.   Pain in scrotum, so painful. Using an ice pack, that stops the swelling. Couple of days, just came about. Bulging disc in lower back. Not sure if that is related. Was calling to speak with Dr. Hendrickson. Triage done- dispo ED. Rationale of need to go to ED now explained to patient. Verb understanding  Reason for Disposition   Scrotum is painful or tender to touch    Protocols used: Scrotum Swelling-A-OH

## 2024-08-26 NOTE — DISCHARGE INSTRUCTIONS
Thank you for coming to our Emergency Department today. It is important to remember that some problems or medical conditions are difficult to diagnose and may not be found or addressed during your Emergency Department visit.  These conditions often start with non-specific symptoms and can only be diagnosed on follow up visits with your primary care physician or specialist when the symptoms continue or change. Please remember that all medical conditions can change, and we cannot predict how you will be feeling tomorrow or the next day. Return to the ER with any questions/concerns, new/concerning symptoms, worsening or failure to improve.       Be sure to follow up with your primary care doctor and review all labs/imaging/tests that were performed during your ER visit with them. It is very common for us to identify non-emergent incidental findings which must be followed up with your primary care physician.  Some labs/imaging/tests may be outside of the normal range, and require non-emergent follow-up and/or further investigation/treatment/procedures/testing to help diagnose/exclude/prevent complications or other potentially serious medical conditions. Some abnormalities may not have been discussed or addressed during your ER visit.     An ER visit does not replace a primary care visit, and many screening tests or follow-up tests cannot be ordered by an ER doctor or performed by the ER. Some tests may even require pre-approval.    If you do not have a primary care doctor, you may contact the one listed on your discharge paperwork or you may also call the [facility] Appointment Desk at [phone] , or [address] [place] [address] at  [phone] to schedule an appointment, or establish care with a primary care doctor or even a specialist and to obtain information about local resources. It is important to your health that you have a primary care doctor.    Please take all medications as directed. We have done our best to select  a medication for you that will treat your condition however, all medications may potentially have side-effects and it is impossible to predict which medications may give you side-effects or what those side-effects (if any) those medications may give you.  If you feel that you are having a negative effect or side-effect of any medication you should stop taking those medications immediately and seek medical attention. If you feel that you are having a life-threatening reaction call 911.        Do not drive, swim, climb to height, take a bath, operate heavy machinery, drink alcohol or take potentially sedating medications, sign any legal documents or make any important decisions for 24 hours if you have received any pain medications, sedatives or mood altering drugs during your ER visit or within 24 hours of taking them if they have been prescribed to you.     You can find additional resources for Dentists, hearing aids, durable medical equipment, low cost pharmacies and other resources at [email or web address].org

## 2024-08-26 NOTE — ED PROVIDER NOTES
Encounter Date: 8/26/2024    SCRIBE #1 NOTE: I, Pretty Gray, am scribing for, and in the presence of,  Yonny Rodriguez PA-C. I have scribed the following portions of the note - Other sections scribed: HPI, ROS.       History     Chief Complaint   Patient presents with    Testicle Pain     Pt reports to ED for testicle pain since this weekend. Pt states he may have testicular torsion may be the case. Also problems with urination.      Min Nunez is a 63 y.o. male, with a past medical history of CAD, HTN, and stroke, who presents to the ED with bilateral testicular pain that began 3 days ago. Patient states he has had intermittent pain since angiogram in 2015 (followed by Dr. Hendrickson), but never this severe. Patient reports applying cold compresses without relief. Patient also reports urinary frequency. Patient denies trauma to the area. Patient denies hematuria, dysuria, or other associated symptoms. NKDA.       The history is provided by the patient. No  was used.     Review of patient's allergies indicates:  No Known Allergies  Past Medical History:   Diagnosis Date    Arthritis     Black-out (not amnesia)     Cardiac angina     pt states intermittent since 2014. Regularly sees Dr. Flaherty    Coronary artery disease     Hearing loss     Hypertension     Neck problem     problems C5-6-pain radiates to both arms and down spine    Stroke     mini  stroke end of 2014 or beginning of 2015     Past Surgical History:   Procedure Laterality Date    CORONARY ANGIOPLASTY      x 3 angiograms 2014. 1st angio no stents place. 2nd Angio x2 stents placed., 3rd angio f/u to check coronary arteries again. no intervention taht last time    CORONARY STENT PLACEMENT      x 2 stents    CYST REMOVAL Left     posterior wrist    neck surgery 2018      pacemaker 2020      testicular surgery 2016       Family History   Problem Relation Name Age of Onset    Hypertension Mother      Heart disease Father       Heart failure Father      Hypertension Father      Heart attack Father       Social History     Tobacco Use    Smoking status: Former     Current packs/day: 0.00     Types: Cigarettes     Quit date: 9/3/2014     Years since quittin.9    Smokeless tobacco: Former     Types: Snuff     Quit date: 9/3/2007    Tobacco comments:     pt states quit smoking 7 months ago   Substance Use Topics    Alcohol use: No     Alcohol/week: 1.0 - 2.0 standard drink of alcohol     Types: 1 - 2 Standard drinks or equivalent per week    Drug use: No     Review of Systems   Constitutional:  Negative for fever.   HENT:  Negative for congestion, sore throat and trouble swallowing.    Respiratory:  Negative for cough and shortness of breath.    Cardiovascular:  Negative for chest pain.   Gastrointestinal:  Negative for abdominal pain, constipation, diarrhea, nausea and vomiting.   Genitourinary:  Positive for frequency and testicular pain (bilateral). Negative for dysuria, flank pain and urgency.   Musculoskeletal:  Negative for back pain.   Skin:  Negative for rash.   Neurological:  Negative for headaches.   All other systems reviewed and are negative.      Physical Exam     Initial Vitals [24 1245]   BP Pulse Resp Temp SpO2   (!) 142/83 92 18 98.4 °F (36.9 °C) (!) 94 %      MAP       --         Physical Exam    Nursing note and vitals reviewed.  Constitutional: He appears well-developed and well-nourished. He is not diaphoretic. No distress.   HENT:   Head: Normocephalic and atraumatic.   Nose: Nose normal.   Eyes: Conjunctivae and EOM are normal. Right eye exhibits no discharge. Left eye exhibits no discharge.   Neck: No tracheal deviation present. No JVD present.   Normal range of motion.  Cardiovascular:  Normal rate and regular rhythm.           Pulmonary/Chest: No accessory muscle usage or stridor. No tachypnea. No respiratory distress.   Genitourinary:    Genitourinary Comments: Chaperoned by JAZZY Osborne.      Mild  tenderness over the right testicular epididymis with palpable mass.  No discernible tenderness to the left testicle.  No discernible swelling.  No erythema.  No hernia.  Cremasteric reflexes intact.     Musculoskeletal:         General: Normal range of motion.      Cervical back: Normal range of motion.     Neurological: He is alert and oriented to person, place, and time. He displays no tremor. He displays no seizure activity. Coordination and gait normal.   Skin: Skin is warm and dry. No pallor.         ED Course   Procedures  Labs Reviewed   URINALYSIS, REFLEX TO URINE CULTURE - Abnormal       Result Value    Specimen UA Urine, Clean Catch      Color, UA Colorless (*)     Appearance, UA Clear      pH, UA 6.0      Specific Gravity, UA >1.030 (*)     Protein, UA Negative      Glucose, UA 4+ (*)     Ketones, UA Negative      Bilirubin (UA) Negative      Occult Blood UA Negative      Nitrite, UA Negative      Urobilinogen, UA Negative      Leukocytes, UA Negative      Narrative:     Specimen Source->Urine   POCT GLUCOSE - Abnormal    POCT Glucose 186 (*)    URINALYSIS MICROSCOPIC    Bacteria None      Yeast, UA None      Microscopic Comment SEE COMMENT      Narrative:     Specimen Source->Urine   C. TRACHOMATIS/N. GONORRHOEAE BY AMP DNA          Imaging Results              US Scrotum And Testicles (Final result)  Result time 08/26/24 16:04:18      Final result by Rey Matamoros Jr., MD (08/26/24 16:04:18)                   Impression:      Left-sided varicocele with rouleaux pattern suggesting slow flow possible impending thrombosis.    Stable large right epididymal head cyst versus loculated hydrocele.      Electronically signed by: Rey Golden Jr  Date:    08/26/2024  Time:    16:04               Narrative:    EXAMINATION:  US SCROTUM AND TESTICLES    CLINICAL HISTORY:  Testicular pain, unspecified    TECHNIQUE:  Sonography of the scrotum and testes.    COMPARISON:  Similar study  08/11/2020    FINDINGS:  Right Testicle:    *Size: 4.3 x 3.2 x 2.8 cm  *Appearance: Mildly heterogeneous echotexture compatible with senescent changes.  Tubular ectasia of the rete testes noted again.  *Flow: Normal arterial and venous flow  *Epididymis: Epididymal parenchyma is not well seen.  Large right epididymal head cyst versus loculated hydrocele again noted, with low level internal echoes throughout measuring 5 x 3 x 3 cm.  *Hydrocele: See above  *Varicocele: None.  .    Left Testicle:    *Size: 4.1 x 3.4 x 2.9 cm  *Appearance: Tubular ectasia of the rete testes and mildly heterogeneous echotexture.  *Flow: Normal arterial and venous flow  *Epididymis: Poorly seen  *Hydrocele: None.  *Varicocele: Present with rouleaux pattern on ultrasound indicative of slow flow.  .    Other findings: None.                                       Medications   HYDROmorphone injection 1 mg (1 mg Intramuscular Given 8/26/24 1405)   ondansetron disintegrating tablet 4 mg (4 mg Oral Given 8/26/24 1405)     Medical Decision Making  Feeling much better in the ED following treatment.  Remains overall well-appearing and hemodynamically stable.  No testicular torsion.  Questionable developing thrombus with associated varicocele per Radiology; reviewed with urologist Dr. Swartz; recommends outpatient management and no indication for anticoagulation today.  Also noted to have epididymal cyst versus hydrocele on the right.  No abscess or other infectious process.  Urology follow-up as an outpatient.  Supportive measures.  Currently in pain management and does not need further pain medication.    Amount and/or Complexity of Data Reviewed  Labs: ordered. Decision-making details documented in ED Course.  Radiology: ordered. Decision-making details documented in ED Course.    Risk  Prescription drug management.            Scribe Attestation:   Scribe #1: I performed the above scribed service and the documentation accurately describes the  services I performed. I attest to the accuracy of the note.                             I, Yonny Rodriguez PA-C, personally performed the services described in this documentation. All medical record entries made by the scribe were at my direction and in my presence. I have reviewed the chart and agree that the record reflects my personal performance and is accurate and complete.      DISCLAIMER: This note was prepared with [employer] voice recognition transcription software. Garbled syntax, mangled pronouns, and other bizarre constructions may be attributed to that software system.    Clinical Impression:  Final diagnoses:  [N50.3] Right Epididymal cyst vs. hydrocele (Primary)  [I86.1] Left varicocele          ED Disposition Condition    Discharge Stable          ED Prescriptions    None       Follow-up Information       Follow up With Specialties Details Why Contact Info    Boubacar Hendrickson MD Urology Schedule an appointment as soon as possible for a visit in 1 day For further evaluation, For more definitive management of your symptoms [address]  [address]  [facility] [address]  [phone]      [facility] - Emergency Dept Emergency Medicine Go to  If symptoms worsen or new symptoms develop [address] [facility] [place] [address]  [phone]             Yonny Rodriguez PA-C  08/26/24 2975

## 2024-08-27 LAB
C TRACH DNA SPEC QL NAA+PROBE: NOT DETECTED
N GONORRHOEA DNA SPEC QL NAA+PROBE: NOT DETECTED

## 2024-08-28 ENCOUNTER — TELEPHONE (OUTPATIENT)
Dept: UROLOGY | Facility: CLINIC | Age: 64
End: 2024-08-28
Payer: MEDICARE

## 2024-08-28 NOTE — PROGRESS NOTES
Subjective:       Patient ID: Min Nunez is a 63 y.o. male The patient's last visit with me was on 6/21/2024.     Chief Complaint:   Chief Complaint   Patient presents with    Testicle Pain    Groin Swelling         Orchalgia  He had a bilateral epididymectomy on 8/5/2016.  He continues to have right sided swelling and bilateral pain.  His right sided pain is worse than left.   He complains of pain with sitting, standing, getting up to stand, after roly movements, lifting, and walking.  He has difficulty walking or performing exercises.    10/20/2021   He continues to have pain, and it seems worse recently.   He denies any hematuria, dysuria, or trauma.      1/28/2022  His pain is about the same.  He is trying to avoid heavy lifting.     7/29/2022  He is about the same.  He had a heart attack recently.  He had an angioplasty.    10/28/2022  His pain is about the same.  No new issues.    6/16/2023  The orchalgia is about the same.  He does not want any surgery.  He is back with an ultrasound.    12/18/2023  He feels about the same.  He has some neck pain and scrotal pain.    6/21/2024  He recently had a epidural, however, it was not helpful.    08/29/2024  He went to the ED recently with groin pain.  He is here for evaluation and MARCIA review.  Of note, he has had more back pain especially in his lower back.   His testicular pain does not change with position.        Urinary frequency and Urgency Incontinence  He has some frequency and urgency that is managed with Ditropan XL 10 mg and Myrbetriq 25 mg and Flomax.  Vesicare is no longer covered.      1/28/2022  IPSS Questionnaire (AUA-7):18/4    7/29/2022  IPSS Questionnaire (AUA-7):  19/5    10/28/2022  He is doing okay with Oxybutynin XL 10 mg BID, and Flomax BID.  He has noted some penile irritation after intercourse, he has been using his wife's estradiol cream.    IPSS Questionnaire (AUA-7):  17/3    6/16/2023    He is taking Flomax and Oxybutynin.  Myrbetriq  is too expensive.  IPSS Questionnaire (AUA-7):  24/3    12/18/2023  He is concerned about having kidney stones.  His brother recently had issues with stones.  He has occasional back pain.  He is taking Flomax and Oxybutynin.    6/21/2024  He is still voiding frequently.  He is taking Flomax and Oxybutynin.      ACTIVE MEDICAL ISSUES:  Patient Active Problem List   Diagnosis    Chest pain, exertional    Coronary artery disease, occlusive    Coronary atherosclerosis    Orchalgia    Dizziness    Syncope    Abnormal brain MRI    Migraines    Atypical migraine    HA (headache)    Numbness    Facial droop    Faintness    Groin pain    Constipation    Hypotension    Urinary urgency    Nocturia more than twice per night    Pneumonia of right lower lobe due to infectious organism    Hypertension    Mixed hyperlipidemia    Morbid obesity    Anxiety    Left varicocele    Epididymal cyst       ALLERGIES AND MEDICATIONS: updated and reviewed.  Review of patient's allergies indicates:  No Known Allergies  Current Outpatient Medications   Medication Sig    albuterol (PROAIR HFA) 90 mcg/actuation inhaler Inhale 2 puffs into the lungs every 6 (six) hours as needed for Wheezing.    busPIRone (BUSPAR) 15 MG tablet Take 1 tablet (15 mg total) by mouth 3 (three) times daily.    chlorhexidine (PERIDEX) 0.12 % solution swish AND SPIT FIFTEEN MILLILITERS BY MOUTH TWICE DAILY    citalopram (CELEXA) 40 MG tablet TAKE 1 TABLET(40 MG) BY MOUTH EVERY DAY    clotrimazole-betamethasone 1-0.05% (LOTRISONE) cream APPLY TOPICALLY TO THE AFFECTED AREA TWICE DAILY    divalproex (DEPAKOTE) 250 MG EC tablet 1 pill in morning, 1 pill in evening (Patient taking differently: Take 500 mg by mouth 2 (two) times daily.)    fenofibrate 160 MG Tab Take 160 mg by mouth once daily.    gabapentin (NEURONTIN) 300 MG capsule Take 2 capsules (600 mg total) by mouth once daily.    hydrOXYzine HCL (ATARAX) 25 MG tablet Take 1 tablet (25 mg total) by mouth 3 (three)  times daily as needed for Anxiety.    hydrOXYzine pamoate (VISTARIL) 25 MG Cap 1 pill 3x a day for 2 days, rest left over every 8 hours as needed for migraine (Patient taking differently: Take 25 mg by mouth once daily. 1 pill 3x a day for 2 days, rest left over every 8 hours as needed for migraine)    lidocaine HCl 3 % Crea APPLY 2 grams to the painful area THREE TIMES A DAY    metoprolol succinate (TOPROL-XL) 25 MG 24 hr tablet Take 25 mg by mouth 2 (two) times daily.     midodrine (PROAMATINE) 2.5 MG Tab Take 2.5 mg by mouth 2 (two) times daily with meals.     mupirocin (BACTROBAN) 2 % ointment 2 (two) times daily.    nitroGLYCERIN (NITROSTAT) 0.4 MG SL tablet Place 0.4 mg under the tongue every 5 (five) minutes as needed for Chest pain.    NON FORMULARY MEDICATION Apply topically 4 (four) times daily as needed. Gabapentin 5%, Orphenadrine 2%, Lidocaine 2%, Prilocaine 2%    omega-3 acid ethyl esters (LOVAZA) 1 gram capsule Take 2 capsules by mouth 2 (two) times daily.    oxybutynin (DITROPAN-XL) 10 MG 24 hr tablet TAKE 1 TABLET(10 MG) BY MOUTH TWICE DAILY    oxycodone-acetaminophen (PERCOCET)  mg per tablet Take 1 tablet by mouth every 4 (four) hours as needed for Pain.    pravastatin (PRAVACHOL) 80 MG tablet Take 80 mg by mouth once daily.    RANEXA 1,000 mg Tb12 Take 1,000 mg by mouth 2 (two) times daily.    RESTASIS 0.05 % ophthalmic emulsion Place 1 drop into both eyes 2 (two) times daily.    tamsulosin (FLOMAX) 0.4 mg Cap Take 2 capsules (0.8 mg total) by mouth once daily.    tizanidine (ZANAFLEX) 4 MG tablet ONE TABLET BY MOUTH IN THE EVENING    traZODone (DESYREL) 150 MG tablet Take 1 tablet (150 mg total) by mouth nightly as needed for Insomnia.    XARELTO 15 mg Tab ONE TABLET BY MOUTH TWICE DAILY FOR 21 DAYS    hydrOXYzine pamoate (VISTARIL) 25 MG Cap Take 1 capsule (25 mg total) by mouth 3 (three) times daily as needed.     No current facility-administered medications for this visit.       Review  of Systems   Constitutional:  Negative for activity change, fatigue, fever and unexpected weight change.   HENT:  Negative for congestion.    Eyes:  Negative for redness.   Respiratory:  Negative for chest tightness and shortness of breath.    Cardiovascular:  Negative for chest pain and leg swelling.   Gastrointestinal:  Negative for abdominal pain, constipation, diarrhea, nausea and vomiting.   Genitourinary:  Positive for testicular pain and urgency. Negative for dysuria, flank pain, frequency, hematuria, penile pain, penile swelling and scrotal swelling.   Musculoskeletal:  Negative for arthralgias and back pain.   Neurological:  Negative for dizziness and light-headedness.   Psychiatric/Behavioral:  Negative for behavioral problems and confusion. The patient is not nervous/anxious.    All other systems reviewed and are negative.      Objective:      Vitals:    08/29/24 0925   Weight: 98.9 kg (218 lb 0.6 oz)                   Physical Exam  Vitals and nursing note reviewed.   Constitutional:       Appearance: He is well-developed.   HENT:      Head: Normocephalic.   Eyes:      Conjunctiva/sclera: Conjunctivae normal.   Neck:      Thyroid: No thyromegaly.      Trachea: No tracheal deviation.   Cardiovascular:      Rate and Rhythm: Normal rate.      Heart sounds: Normal heart sounds.   Pulmonary:      Effort: Pulmonary effort is normal. No respiratory distress.      Breath sounds: Normal breath sounds. No wheezing.   Abdominal:      General: Bowel sounds are normal.      Palpations: Abdomen is soft.      Tenderness: There is no abdominal tenderness. There is no rebound.      Hernia: No hernia is present.   Genitourinary:     Penis: Normal and circumcised.       Testes:         Right: Tenderness present.         Left: Tenderness present.      Comments: Large right epididymal cyst  Left Grade I varicocele  Musculoskeletal:         General: No tenderness. Normal range of motion.      Cervical back: Normal range of  motion and neck supple.   Lymphadenopathy:      Cervical: No cervical adenopathy.   Skin:     General: Skin is warm and dry.      Findings: No erythema or rash.   Neurological:      Mental Status: He is alert and oriented to person, place, and time.   Psychiatric:         Behavior: Behavior normal.         Thought Content: Thought content normal.         Judgment: Judgment normal.         Urine dipstick shows negative for all components.  Micro exam: negative for WBC's or RBC's.    Component Ref Range & Units 3 d ago 9 mo ago 1 yr ago 3 yr ago 4 yr ago 5 yr ago 6 yr ago   PSA Diagnostic 0.00 - 4.00 ng/mL 0.28 0.43 CM 0.42 CM 0.39 CM 0.43 CM 0.33 CM 0.44 CM   Comment: The testing method is a chemiluminescent microparticle immunoassay  manufactured by [employer] and performed on the [employer]  or  [employer]AP[employer] system. Values obtained with different assay manufacturers  for  methods may be different and cannot be used interchangeably.  PSA Expected levels:  Hormonal Therapy: <0.05 ng/ml  Prostatectomy: <0.01 ng/ml  Radiation Therapy: <1.00 ng/ml   Resulting Agency  [facility]              Narrative  Performed by: [facility]  PSA 3 months      Specimen Collected: 06/18/24 09:25 CDT               9/2021  DIS Scrotal US  Good blood flow, bilaterally  Right 4.6 cm spermatocele    4/19/2022  Good blood flow bilaterally  Right 4.7 cm spermatocele    3/2023  Good Blood flow  Left epididymal head cyst 1.7 cm  Right epididymal head cyst 5.2 cm    6/2024  Good Flow  Left sided cyst 0.6, rete testis  Right 4.4 cm        US Scrotum And Testicles  Order: [id number]  Status: Final result       Visible to patient: Yes (not seen)       Next appt: 08/29/2024 at 09:30 AM in Urology (Boubacar Hendrickson MD)       Dx: Right Epididymal cyst vs. hydrocele    0 Result Notes  Details    Reading Physician Reading Date Result Priority   Rey Matamoros Jr., MD  [phone] 8/26/2024 STAT     Narrative &  Impression  EXAMINATION:  US SCROTUM AND TESTICLES     CLINICAL HISTORY:  Testicular pain, unspecified     TECHNIQUE:  Sonography of the scrotum and testes.     COMPARISON:  Similar study 08/11/2020     FINDINGS:  Right Testicle:     *Size: 4.3 x 3.2 x 2.8 cm  *Appearance: Mildly heterogeneous echotexture compatible with senescent changes.  Tubular ectasia of the rete testes noted again.  *Flow: Normal arterial and venous flow  *Epididymis: Epididymal parenchyma is not well seen.  Large right epididymal head cyst versus loculated hydrocele again noted, with low level internal echoes throughout measuring 5 x 3 x 3 cm.  *Hydrocele: See above  *Varicocele: None.  .     Left Testicle:     *Size: 4.1 x 3.4 x 2.9 cm  *Appearance: Tubular ectasia of the rete testes and mildly heterogeneous echotexture.  *Flow: Normal arterial and venous flow  *Epididymis: Poorly seen  *Hydrocele: None.  *Varicocele: Present with rouleaux pattern on ultrasound indicative of slow flow.  .     Other findings: None.     Impression:     Left-sided varicocele with rouleaux pattern suggesting slow flow possible impending thrombosis.     Stable large right epididymal head cyst versus loculated hydrocele.        Electronically signed by:Rey Golden Jr  Date:                                            08/26/2024  Time:                                           16:04           Exam Ended: 08/26/24 15:47 CDT                 Assessment:       1. Pain in both testicles    2. Urinary urgency    3. BPH without obstruction/lower urinary tract symptoms                          Plan:       1. Pain in both testicles  His Exam and MARCIA have not really changed   I suspect back issues causing referred pain  He is due for MRI soon    2. Urinary urgency  Oxybutynin    3. BPH without obstruction/lower urinary tract symptoms  stable       Follow up in about 8 weeks (around 10/24/2024) for Follow up Established.

## 2024-08-28 NOTE — TELEPHONE ENCOUNTER
----- Message from Lissette Rivera sent at 8/28/2024  1:45 PM CDT -----  Type: Patient Call Back    Who called: self     What is the request in detail: patient stated that he was seen at ED on Monday for genital pain, pt is in pain with a ice pack on genital. Would like to know to do. Please call    Can the clinic reply by [facility]? No    Would the patient rather a call back or a response via [facility]?  call    Best call back number: [phone]    Additional Information:

## 2024-08-29 ENCOUNTER — OFFICE VISIT (OUTPATIENT)
Dept: UROLOGY | Facility: CLINIC | Age: 64
End: 2024-08-29
Payer: MEDICARE

## 2024-08-29 VITALS — WEIGHT: 218.06 LBS | BODY MASS INDEX: 32.2 KG/M2

## 2024-08-29 DIAGNOSIS — N40.0 BPH WITHOUT OBSTRUCTION/LOWER URINARY TRACT SYMPTOMS: ICD-10-CM

## 2024-08-29 DIAGNOSIS — R39.15 URINARY URGENCY: ICD-10-CM

## 2024-08-29 DIAGNOSIS — N50.811 PAIN IN BOTH TESTICLES: Primary | ICD-10-CM

## 2024-08-29 DIAGNOSIS — N50.812 PAIN IN BOTH TESTICLES: Primary | ICD-10-CM

## 2024-08-29 PROCEDURE — 99214 OFFICE O/P EST MOD 30 MIN: CPT | Mod: S$PBB,,, | Performed by: UROLOGY

## 2024-08-29 PROCEDURE — 99999 PR PBB SHADOW E&M-EST. PATIENT-LVL IV: CPT | Mod: PBBFAC,,, | Performed by: UROLOGY

## 2024-08-29 PROCEDURE — 99214 OFFICE O/P EST MOD 30 MIN: CPT | Mod: PBBFAC | Performed by: UROLOGY

## 2024-11-05 ENCOUNTER — OFFICE VISIT (OUTPATIENT)
Dept: PSYCHIATRY | Facility: CLINIC | Age: 64
End: 2024-11-05
Payer: MEDICARE

## 2024-11-05 DIAGNOSIS — F41.9 ANXIETY: Primary | ICD-10-CM

## 2024-11-05 RX ORDER — BUSPIRONE HYDROCHLORIDE 15 MG/1
15 TABLET ORAL 3 TIMES DAILY
Qty: 270 TABLET | Refills: 1 | Status: SHIPPED | OUTPATIENT
Start: 2024-11-05

## 2024-11-05 RX ORDER — HYDROXYZINE HYDROCHLORIDE 25 MG/1
25 TABLET, FILM COATED ORAL 3 TIMES DAILY PRN
Qty: 270 TABLET | Refills: 1 | Status: SHIPPED | OUTPATIENT
Start: 2024-11-05

## 2024-11-05 RX ORDER — TRAZODONE HYDROCHLORIDE 150 MG/1
150 TABLET ORAL NIGHTLY PRN
Qty: 90 TABLET | Refills: 0 | Status: SHIPPED | OUTPATIENT
Start: 2024-11-05

## 2024-11-05 RX ORDER — CITALOPRAM 40 MG/1
TABLET, FILM COATED ORAL
Qty: 90 TABLET | Refills: 1 | Status: SHIPPED | OUTPATIENT
Start: 2024-11-05

## 2024-11-05 NOTE — PROGRESS NOTES
ESTABLISHED OUTPATIENT VISIT   E/M LEVEL 4: [id number]    ENCOUNTER DATE: 11/5/2024  SITE: [facility], [address]    HISTORY    CHIEF COMPLAINT   Min Nunez is a 63 y.o. male who presents for follow up of anxiety/depression.    HPI    Continues to struggle with multiple physical issues. Physical pain remains troublesome.    Overall appears stable psychiatrically.    Satisfied with current psychotropic medication regimen.    Watches TV.    Reports, that son continues to do well.    Psychiatric Review Of Systems - Is patient experiencing or having changes in:  sleep: varies  appetite: no  weight: no  energy/anergy: no  interest/pleasure/anhedonia: no  somatic symptoms: no  libido: no  anxiety/panic: anxiety at times  guilty/hopelessness: no  concentration: no  S.I.B.s/risky behavior: no  Irritability: no  Racing thoughts: no  Impulsive behaviors: no  Paranoia:no  AVH:no    Recent alcohol: no  Recent drug: no    Medical ROS   Multiple physical problems     PAST MEDICAL, FAMILY AND SOCIAL HISTORY: The patient's past medical, family and social history have been reviewed and updated as appropriate within the electronic medical record - see encounter notes.    PSYCHOTROPIC MEDICATIONS   Trazodone  mg at bedtime prn, Buspar 15 mg tid, Celexa 40 mg qam, Depakote 500 mg bid[for headaches], Hydroxyzine prn for migraines[takes 25 mg bid-tid]    Scheduled and PRN Medications     Current Outpatient Medications:     albuterol (PROAIR HFA) 90 mcg/actuation inhaler, Inhale 2 puffs into the lungs every 6 (six) hours as needed for Wheezing., Disp: 18 g, Rfl: 0    busPIRone (BUSPAR) 15 MG tablet, Take 1 tablet (15 mg total) by mouth 3 (three) times daily., Disp: 270 tablet, Rfl: 1    chlorhexidine (PERIDEX) 0.12 % solution, swish AND SPIT FIFTEEN MILLILITERS BY MOUTH TWICE DAILY, Disp: , Rfl: 0    citalopram (CELEXA) 40 MG tablet, TAKE 1 TABLET(40 MG) BY MOUTH EVERY DAY, Disp: 90 tablet, Rfl: 1     clotrimazole-betamethasone 1-0.05% (LOTRISONE) cream, APPLY TOPICALLY TO THE AFFECTED AREA TWICE DAILY, Disp: 45 g, Rfl: 1    divalproex (DEPAKOTE) 250 MG EC tablet, 1 pill in morning, 1 pill in evening (Patient taking differently: Take 500 mg by mouth 2 (two) times daily.), Disp: 60 tablet, Rfl: 6    fenofibrate 160 MG Tab, Take 160 mg by mouth once daily., Disp: , Rfl:     gabapentin (NEURONTIN) 300 MG capsule, Take 2 capsules (600 mg total) by mouth once daily., Disp: 180 capsule, Rfl: 11    hydrOXYzine HCL (ATARAX) 25 MG tablet, Take 1 tablet (25 mg total) by mouth 3 (three) times daily as needed for Anxiety., Disp: 270 tablet, Rfl: 1    hydrOXYzine pamoate (VISTARIL) 25 MG Cap, 1 pill 3x a day for 2 days, rest left over every 8 hours as needed for migraine (Patient taking differently: Take 25 mg by mouth once daily. 1 pill 3x a day for 2 days, rest left over every 8 hours as needed for migraine), Disp: 15 capsule, Rfl: 3    hydrOXYzine pamoate (VISTARIL) 25 MG Cap, Take 1 capsule (25 mg total) by mouth 3 (three) times daily as needed., Disp: 30 capsule, Rfl: 3    lidocaine HCl 3 % Crea, APPLY 2 grams to the painful area THREE TIMES A DAY, Disp: , Rfl: 3    metoprolol succinate (TOPROL-XL) 25 MG 24 hr tablet, Take 25 mg by mouth 2 (two) times daily. , Disp: , Rfl:     midodrine (PROAMATINE) 2.5 MG Tab, Take 2.5 mg by mouth 2 (two) times daily with meals. , Disp: , Rfl:     mupirocin (BACTROBAN) 2 % ointment, 2 (two) times daily., Disp: , Rfl: 0    nitroGLYCERIN (NITROSTAT) 0.4 MG SL tablet, Place 0.4 mg under the tongue every 5 (five) minutes as needed for Chest pain., Disp: , Rfl:     NON FORMULARY MEDICATION, Apply topically 4 (four) times daily as needed. Gabapentin 5%, Orphenadrine 2%, Lidocaine 2%, Prilocaine 2%, Disp: , Rfl:     omega-3 acid ethyl esters (LOVAZA) 1 gram capsule, Take 2 capsules by mouth 2 (two) times daily., Disp: , Rfl: 3    oxybutynin (DITROPAN-XL) 10 MG 24 hr tablet, TAKE 1 TABLET(10  MG) BY MOUTH TWICE DAILY, Disp: 180 tablet, Rfl: 3    oxycodone-acetaminophen (PERCOCET)  mg per tablet, Take 1 tablet by mouth every 4 (four) hours as needed for Pain., Disp: 30 tablet, Rfl: 0    pravastatin (PRAVACHOL) 80 MG tablet, Take 80 mg by mouth once daily., Disp: , Rfl: 4    RANEXA 1,000 mg Tb12, Take 1,000 mg by mouth 2 (two) times daily., Disp: , Rfl: 3    RESTASIS 0.05 % ophthalmic emulsion, Place 1 drop into both eyes 2 (two) times daily., Disp: , Rfl: 4    tamsulosin (FLOMAX) 0.4 mg Cap, Take 2 capsules (0.8 mg total) by mouth once daily., Disp: 180 capsule, Rfl: 3    tizanidine (ZANAFLEX) 4 MG tablet, ONE TABLET BY MOUTH IN THE EVENING, Disp: , Rfl: 2    traZODone (DESYREL) 150 MG tablet, Take 1 tablet (150 mg total) by mouth nightly as needed for Insomnia., Disp: 90 tablet, Rfl: 0    XARELTO 15 mg Tab, ONE TABLET BY MOUTH TWICE DAILY FOR 21 DAYS, Disp: , Rfl: 0    EXAMINATION    There were no vitals filed for this visit.    CONSTITUTIONAL  General Appearance: well nourished    MUSCULOSKELETAL  Muscle Strength and Tone: normal strength and tone  Abnormal Involuntary Movements: no abnormal movement noted  Gait and Station: normal gait    PSYCHIATRIC   Level of Consciousness: alert  Orientation: oriented to person, place and time  Grooming: well groomed  Psychomotor Behavior: no restlessness/agitation  Speech: normal in rate, rhythm and volume  Language: normal vocabulary  Mood: anxious at times  Affect: full range and appropriate  Thought Process: logical and goal directed  Associations: intact associations  Thought Content: no SI/HI  Memory: grossly intact  Attention: intact to content of interview  Fund of Knowledge: appears adequate  Insight: good  Judgement: good    MEDICAL DECISION MAKING    DIAGNOSES  Anxiety d/o, unspecified    PROBLEM LIST AND MANAGEMENT PLANS  - anxiety:   Continue Celexa, Buspar, prn Trazodone, and prn Hydroxyzine as above  - rtc 3 months     Time with patient: 20  min    LABORATORY DATA  Admission on 08/26/2024, Discharged on 08/26/2024   Component Date Value Ref Range Status    Specimen UA 08/26/2024 Urine, Clean Catch   Final    Color, UA 08/26/2024 Colorless (A)  Yellow, Straw, Leonila Final    Appearance, UA 08/26/2024 Clear  Clear Final    pH, UA 08/26/2024 6.0  5.0 - 8.0 Final    Specific Gravity, UA 08/26/2024 >1.030 (A)  1.005 - 1.030 Final    Protein, UA 08/26/2024 Negative  Negative Final    Comment: Recommend a 24 hour urine protein or a urine   protein/creatinine ratio if globulin induced proteinuria is  clinically suspected.      Glucose, UA 08/26/2024 4+ (A)  Negative Final    Ketones, UA 08/26/2024 Negative  Negative Final    Bilirubin (UA) 08/26/2024 Negative  Negative Final    Occult Blood UA 08/26/2024 Negative  Negative Final    Nitrite, UA 08/26/2024 Negative  Negative Final    Urobilinogen, UA 08/26/2024 Negative  <2.0 EU/dL Final    Leukocytes, UA 08/26/2024 Negative  Negative Final    Chlamydia, Amplified DNA 08/26/2024 Not Detected  Not Detected Final    N gonorrhoeae, amplified DNA 08/26/2024 Not Detected  Not Detected Final    Bacteria 08/26/2024 None  None-Occ /hpf Final    Yeast, UA 08/26/2024 None  None Final    Microscopic Comment 08/26/2024 SEE COMMENT   Final    Comment: Other formed elements not mentioned in the report are not   present in the microscopic examination.       POCT Glucose 08/26/2024 186 (H)  70 - 110 mg/dL Final           Sampson Luke

## 2024-12-02 ENCOUNTER — OFFICE VISIT (OUTPATIENT)
Dept: UROLOGY | Facility: CLINIC | Age: 64
End: 2024-12-02
Payer: MEDICARE

## 2024-12-02 VITALS — BODY MASS INDEX: 32.8 KG/M2 | WEIGHT: 222.13 LBS

## 2024-12-02 DIAGNOSIS — R39.15 URINARY URGENCY: ICD-10-CM

## 2024-12-02 DIAGNOSIS — N50.811 PAIN IN BOTH TESTICLES: Primary | ICD-10-CM

## 2024-12-02 DIAGNOSIS — N40.0 BPH WITHOUT OBSTRUCTION/LOWER URINARY TRACT SYMPTOMS: ICD-10-CM

## 2024-12-02 DIAGNOSIS — N50.812 PAIN IN BOTH TESTICLES: Primary | ICD-10-CM

## 2024-12-02 PROCEDURE — 99213 OFFICE O/P EST LOW 20 MIN: CPT | Mod: PBBFAC | Performed by: UROLOGY

## 2024-12-02 PROCEDURE — 99999 PR PBB SHADOW E&M-EST. PATIENT-LVL III: CPT | Mod: PBBFAC,,, | Performed by: UROLOGY

## 2024-12-02 PROCEDURE — 99214 OFFICE O/P EST MOD 30 MIN: CPT | Mod: S$PBB,,, | Performed by: UROLOGY

## 2024-12-02 NOTE — PROGRESS NOTES
Subjective:       Patient ID: Min Nunez is a 63 y.o. male The patient's last visit with me was on 8/29/2024.     Chief Complaint:   Chief Complaint   Patient presents with    Follow-up         Orchalgia  He had a bilateral epididymectomy on 8/5/2016.  He continues to have right sided swelling and bilateral pain.  His right sided pain is worse than left.   He complains of pain with sitting, standing, getting up to stand, after roly movements, lifting, and walking.  He has difficulty walking or performing exercises.    10/20/2021   He continues to have pain, and it seems worse recently.   He denies any hematuria, dysuria, or trauma.      1/28/2022  His pain is about the same.  He is trying to avoid heavy lifting.     7/29/2022  He is about the same.  He had a heart attack recently.  He had an angioplasty.    10/28/2022  His pain is about the same.  No new issues.    6/16/2023  The orchalgia is about the same.  He does not want any surgery.  He is back with an ultrasound.    12/18/2023  He feels about the same.  He has some neck pain and scrotal pain.    6/21/2024  He recently had a epidural, however, it was not helpful.    8/29/2024  He went to the ED recently with groin pain.  He is here for evaluation and MARCIA review.  Of note, he has had more back pain especially in his lower back.   His testicular pain does not change with position.    12/02/2024  He continues to have pain.  He feels his spine issues are getting worse.  He does not want surgery.        Urinary frequency and Urgency Incontinence  He has some frequency and urgency that is managed with Ditropan XL 10 mg and Myrbetriq 25 mg and Flomax.  [employer]es[employer] is no longer covered.      1/28/2022  IPSS Questionnaire (AUA-7):18/4 7/29/2022  IPSS Questionnaire (AUA-7):  19/5    10/28/2022  He is doing okay with Oxybutynin XL 10 mg BID, and Flomax BID.  He has noted some penile irritation after intercourse, he has been using his wife's estradiol cream.    IPSS  Questionnaire (AUA-7):  17/3    6/16/2023    He is taking Flomax and Oxybutynin.  Myrbetriq is too expensive.  IPSS Questionnaire (AUA-7):  24/3    12/18/2023  He is concerned about having kidney stones.  His brother recently had issues with stones.  He has occasional back pain.  He is taking Flomax and Oxybutynin.    6/21/2024  He is still voiding frequently.  He is taking Flomax and Oxybutynin.    12/02/2024  He is doing okay with Flomax and Oxybutynin.      ACTIVE MEDICAL ISSUES:  Patient Active Problem List   Diagnosis    Chest pain, exertional    Coronary artery disease, occlusive    Coronary atherosclerosis    Orchalgia    Dizziness    Syncope    Abnormal brain MRI    Migraines    Atypical migraine    HA (headache)    Numbness    Facial droop    Faintness    Groin pain    Constipation    Hypotension    Urinary urgency    Nocturia more than twice per night    Pneumonia of right lower lobe due to infectious organism    Hypertension    Mixed hyperlipidemia    Morbid obesity    Anxiety    Left varicocele    Epididymal cyst       ALLERGIES AND MEDICATIONS: updated and reviewed.  Review of patient's allergies indicates:  No Known Allergies  Current Outpatient Medications   Medication Sig    albuterol (PROAIR HFA) 90 mcg/actuation inhaler Inhale 2 puffs into the lungs every 6 (six) hours as needed for Wheezing.    busPIRone (BUSPAR) 15 MG tablet Take 1 tablet (15 mg total) by mouth 3 (three) times daily.    chlorhexidine (PERIDEX) 0.12 % solution swish AND SPIT FIFTEEN MILLILITERS BY MOUTH TWICE DAILY    citalopram (CELEXA) 40 MG tablet TAKE 1 TABLET(40 MG) BY MOUTH EVERY DAY    clotrimazole-betamethasone 1-0.05% (LOTRISONE) cream APPLY TOPICALLY TO THE AFFECTED AREA TWICE DAILY    divalproex (DEPAKOTE) 250 MG EC tablet 1 pill in morning, 1 pill in evening (Patient taking differently: Take 500 mg by mouth 2 (two) times daily.)    fenofibrate 160 MG Tab Take 160 mg by mouth once daily.    gabapentin (NEURONTIN) 300 MG  capsule Take 2 capsules (600 mg total) by mouth once daily.    hydrOXYzine HCL (ATARAX) 25 MG tablet Take 1 tablet (25 mg total) by mouth 3 (three) times daily as needed for Anxiety.    hydrOXYzine pamoate (VISTARIL) 25 MG Cap 1 pill 3x a day for 2 days, rest left over every 8 hours as needed for migraine (Patient taking differently: Take 25 mg by mouth once daily. 1 pill 3x a day for 2 days, rest left over every 8 hours as needed for migraine)    hydrOXYzine pamoate (VISTARIL) 25 MG Cap Take 1 capsule (25 mg total) by mouth 3 (three) times daily as needed.    lidocaine HCl 3 % Crea APPLY 2 grams to the painful area THREE TIMES A DAY    metoprolol succinate (TOPROL-XL) 25 MG 24 hr tablet Take 25 mg by mouth 2 (two) times daily.     midodrine (PROAMATINE) 2.5 MG Tab Take 2.5 mg by mouth 2 (two) times daily with meals.     mupirocin (BACTROBAN) 2 % ointment 2 (two) times daily.    nitroGLYCERIN (NITROSTAT) 0.4 MG SL tablet Place 0.4 mg under the tongue every 5 (five) minutes as needed for Chest pain.    NON FORMULARY MEDICATION Apply topically 4 (four) times daily as needed. Gabapentin 5%, Orphenadrine 2%, Lidocaine 2%, Prilocaine 2%    omega-3 acid ethyl esters (LOVAZA) 1 gram capsule Take 2 capsules by mouth 2 (two) times daily.    oxybutynin (DITROPAN-XL) 10 MG 24 hr tablet TAKE 1 TABLET(10 MG) BY MOUTH TWICE DAILY    oxycodone-acetaminophen (PERCOCET)  mg per tablet Take 1 tablet by mouth every 4 (four) hours as needed for Pain.    pravastatin (PRAVACHOL) 80 MG tablet Take 80 mg by mouth once daily.    RANEXA 1,000 mg Tb12 Take 1,000 mg by mouth 2 (two) times daily.    RESTASIS 0.05 % ophthalmic emulsion Place 1 drop into both eyes 2 (two) times daily.    tamsulosin (FLOMAX) 0.4 mg Cap Take 2 capsules (0.8 mg total) by mouth once daily.    tizanidine (ZANAFLEX) 4 MG tablet ONE TABLET BY MOUTH IN THE EVENING    traZODone (DESYREL) 150 MG tablet Take 1 tablet (150 mg total) by mouth nightly as needed for  Insomnia.    XARELTO 15 mg Tab ONE TABLET BY MOUTH TWICE DAILY FOR 21 DAYS     No current facility-administered medications for this visit.       Review of Systems   Constitutional:  Negative for activity change, fatigue, fever and unexpected weight change.   HENT:  Negative for congestion.    Eyes:  Negative for redness.   Respiratory:  Negative for chest tightness and shortness of breath.    Cardiovascular:  Negative for chest pain and leg swelling.   Gastrointestinal:  Negative for abdominal pain, constipation, diarrhea, nausea and vomiting.   Genitourinary:  Positive for testicular pain and urgency. Negative for dysuria, flank pain, frequency, hematuria, penile pain, penile swelling and scrotal swelling.   Musculoskeletal:  Negative for arthralgias and back pain.   Neurological:  Negative for dizziness and light-headedness.   Psychiatric/Behavioral:  Negative for behavioral problems and confusion. The patient is not nervous/anxious.    All other systems reviewed and are negative.      Objective:      Vitals:    12/02/24 1320   Weight: 100.8 kg (222 lb 1.8 oz)                     Physical Exam  Vitals and nursing note reviewed.   Constitutional:       Appearance: He is well-developed.   HENT:      Head: Normocephalic.   Eyes:      Conjunctiva/sclera: Conjunctivae normal.   Neck:      Thyroid: No thyromegaly.      Trachea: No tracheal deviation.   Cardiovascular:      Rate and Rhythm: Normal rate.      Heart sounds: Normal heart sounds.   Pulmonary:      Effort: Pulmonary effort is normal. No respiratory distress.      Breath sounds: Normal breath sounds. No wheezing.   Abdominal:      General: Bowel sounds are normal.      Palpations: Abdomen is soft.      Tenderness: There is no abdominal tenderness. There is no rebound.      Hernia: No hernia is present.   Musculoskeletal:         General: No tenderness. Normal range of motion.      Cervical back: Normal range of motion and neck supple.   Lymphadenopathy:       Cervical: No cervical adenopathy.   Skin:     General: Skin is warm and dry.      Findings: No erythema or rash.   Neurological:      Mental Status: He is alert and oriented to person, place, and time.   Psychiatric:         Behavior: Behavior normal.         Thought Content: Thought content normal.         Judgment: Judgment normal.         Urine dipstick shows negative for all components.  Micro exam: negative for WBC's or RBC's.    Component Ref Range & Units 3 d ago 9 mo ago 1 yr ago 3 yr ago 4 yr ago 5 yr ago 6 yr ago   PSA Diagnostic 0.00 - 4.00 ng/mL 0.28 0.43 CM 0.42 CM 0.39 CM 0.43 CM 0.33 CM 0.44 CM   Comment: The testing method is a chemiluminescent microparticle immunoassay  manufactured by [employer] and performed on the YAMAP  or  Apptive system. Values obtained with different assay manufacturers  for  methods may be different and cannot be used interchangeably.  PSA Expected levels:  Hormonal Therapy: <0.05 ng/ml  Prostatectomy: <0.01 ng/ml  Radiation Therapy: <1.00 ng/ml   Resulting Agency  [facility]              Narrative  Performed by: [facility]  PSA 3 months      Specimen Collected: 06/18/24 09:25 CDT               9/2021  [facility] Scrotal US  Good blood flow, bilaterally  Right 4.6 cm spermatocele    4/19/2022  Good blood flow bilaterally  Right 4.7 cm spermatocele    3/2023  Good Blood flow  Left epididymal head cyst 1.7 cm  Right epididymal head cyst 5.2 cm    6/2024  Good Flow  Left sided cyst 0.6, rete testis  Right 4.4 cm        US Scrotum And Testicles  Order: [id number]  Status: Final result       Visible to patient: Yes (not seen)       Next appt: 08/29/2024 at 09:30 AM in Urology (Boubacar Hendrickson MD)       Dx: Right Epididymal cyst vs. hydrocele    0 Result Notes  Details    Reading Physician Reading Date Result Priority   Rey Matamoros Jr., MD  [phone] 8/26/2024 STAT     Narrative & Impression  EXAMINATION:  US SCROTUM AND TESTICLES      CLINICAL HISTORY:  Testicular pain, unspecified     TECHNIQUE:  Sonography of the scrotum and testes.     COMPARISON:  Similar study 08/11/2020     FINDINGS:  Right Testicle:     *Size: 4.3 x 3.2 x 2.8 cm  *Appearance: Mildly heterogeneous echotexture compatible with senescent changes.  Tubular ectasia of the rete testes noted again.  *Flow: Normal arterial and venous flow  *Epididymis: Epididymal parenchyma is not well seen.  Large right epididymal head cyst versus loculated hydrocele again noted, with low level internal echoes throughout measuring 5 x 3 x 3 cm.  *Hydrocele: See above  *Varicocele: None.  .     Left Testicle:     *Size: 4.1 x 3.4 x 2.9 cm  *Appearance: Tubular ectasia of the rete testes and mildly heterogeneous echotexture.  *Flow: Normal arterial and venous flow  *Epididymis: Poorly seen  *Hydrocele: None.  *Varicocele: Present with rouleaux pattern on ultrasound indicative of slow flow.  .     Other findings: None.     Impression:     Left-sided varicocele with rouleaux pattern suggesting slow flow possible impending thrombosis.     Stable large right epididymal head cyst versus loculated hydrocele.        Electronically signed by:Rey Golden Jr  Date:                                            08/26/2024  Time:                                           16:04           Exam Ended: 08/26/24 15:47 CDT                 Assessment:       1. Pain in both testicles    2. BPH without obstruction/lower urinary tract symptoms    3. Urinary urgency                            Plan:       1. BPH without obstruction/lower urinary tract symptoms  Flomax    2. Pain in both testicles (Primary)  Stable, monitor    3. Urinary urgency  Oxybutynin         Follow up in about 4 months (around 4/2/2025) for Follow up Established.

## 2025-02-05 ENCOUNTER — OFFICE VISIT (OUTPATIENT)
Dept: PSYCHIATRY | Facility: CLINIC | Age: 65
End: 2025-02-05
Payer: MEDICARE

## 2025-02-05 VITALS
BODY MASS INDEX: 33.14 KG/M2 | HEART RATE: 87 BPM | DIASTOLIC BLOOD PRESSURE: 72 MMHG | WEIGHT: 224.44 LBS | SYSTOLIC BLOOD PRESSURE: 156 MMHG

## 2025-02-05 DIAGNOSIS — F41.9 ANXIETY: Primary | ICD-10-CM

## 2025-02-05 PROCEDURE — 99211 OFF/OP EST MAY X REQ PHY/QHP: CPT | Mod: PBBFAC | Performed by: PSYCHIATRY & NEUROLOGY

## 2025-02-05 PROCEDURE — 99214 OFFICE O/P EST MOD 30 MIN: CPT | Mod: S$PBB,,, | Performed by: PSYCHIATRY & NEUROLOGY

## 2025-02-05 PROCEDURE — 99999 PR PBB SHADOW E&M-EST. PATIENT-LVL I: CPT | Mod: PBBFAC,,, | Performed by: PSYCHIATRY & NEUROLOGY

## 2025-02-05 RX ORDER — TRAZODONE HYDROCHLORIDE 150 MG/1
150 TABLET ORAL NIGHTLY PRN
Qty: 90 TABLET | Refills: 0 | Status: SHIPPED | OUTPATIENT
Start: 2025-02-05

## 2025-02-05 RX ORDER — HYDROXYZINE HYDROCHLORIDE 25 MG/1
25 TABLET, FILM COATED ORAL 3 TIMES DAILY PRN
Qty: 270 TABLET | Refills: 1 | Status: SHIPPED | OUTPATIENT
Start: 2025-02-05

## 2025-02-05 RX ORDER — BUSPIRONE HYDROCHLORIDE 15 MG/1
15 TABLET ORAL 3 TIMES DAILY
Qty: 270 TABLET | Refills: 1 | Status: SHIPPED | OUTPATIENT
Start: 2025-02-05

## 2025-02-05 RX ORDER — CITALOPRAM 40 MG/1
TABLET, FILM COATED ORAL
Qty: 90 TABLET | Refills: 1 | Status: SHIPPED | OUTPATIENT
Start: 2025-02-05

## 2025-02-05 NOTE — PROGRESS NOTES
ESTABLISHED OUTPATIENT VISIT   E/M LEVEL 4: [id number]    ENCOUNTER DATE: 2/5/2025  SITE: [facility], [address]    HISTORY    CHIEF COMPLAINT   Min Nunez is a 64 y.o. male who presents for follow up of anxiety/depression.    HPI    Sister Temitope Ashton present.    Continues to struggle with multiple physical issues. Physical pain remains troublesome.    Overall appears stable psychiatrically.    Satisfied with current psychotropic medication regimen.    Watches TV.    Reports, that son continues to do well.    Enjoying grandson.    Psychiatric Review Of Systems - Is patient experiencing or having changes in:  sleep: varies  appetite: no  weight: no  energy/anergy: no  interest/pleasure/anhedonia: no  somatic symptoms: no  libido: no  anxiety/panic: anxiety at times  guilty/hopelessness: no  concentration: no  S.I.B.s/risky behavior: no  Irritability: no  Racing thoughts: no  Impulsive behaviors: no  Paranoia:no  AVH:no    Recent alcohol: no  Recent drug: no    Medical ROS   Multiple physical problems     PAST MEDICAL, FAMILY AND SOCIAL HISTORY: The patient's past medical, family and social history have been reviewed and updated as appropriate within the electronic medical record - see encounter notes.    PSYCHOTROPIC MEDICATIONS   Trazodone  mg at bedtime prn, Buspar 15 mg tid, Celexa 40 mg qam, Depakote 500 mg bid[for headaches], Hydroxyzine prn for migraines[takes 25 mg bid-tid]    Scheduled and PRN Medications     Current Outpatient Medications:     albuterol (PROAIR HFA) 90 mcg/actuation inhaler, Inhale 2 puffs into the lungs every 6 (six) hours as needed for Wheezing., Disp: 18 g, Rfl: 0    busPIRone (BUSPAR) 15 MG tablet, Take 1 tablet (15 mg total) by mouth 3 (three) times daily., Disp: 270 tablet, Rfl: 1    chlorhexidine (PERIDEX) 0.12 % solution, swish AND SPIT FIFTEEN MILLILITERS BY MOUTH TWICE DAILY, Disp: , Rfl: 0    citalopram (CELEXA) 40 MG tablet, TAKE 1 TABLET(40 MG) BY  MOUTH EVERY DAY, Disp: 90 tablet, Rfl: 1    clotrimazole-betamethasone 1-0.05% (LOTRISONE) cream, APPLY TOPICALLY TO THE AFFECTED AREA TWICE DAILY, Disp: 45 g, Rfl: 1    divalproex (DEPAKOTE) 250 MG EC tablet, 1 pill in morning, 1 pill in evening (Patient taking differently: Take 500 mg by mouth 2 (two) times daily.), Disp: 60 tablet, Rfl: 6    fenofibrate 160 MG Tab, Take 160 mg by mouth once daily., Disp: , Rfl:     gabapentin (NEURONTIN) 300 MG capsule, Take 2 capsules (600 mg total) by mouth once daily., Disp: 180 capsule, Rfl: 11    hydrOXYzine HCL (ATARAX) 25 MG tablet, Take 1 tablet (25 mg total) by mouth 3 (three) times daily as needed for Anxiety., Disp: 270 tablet, Rfl: 1    hydrOXYzine pamoate (VISTARIL) 25 MG Cap, 1 pill 3x a day for 2 days, rest left over every 8 hours as needed for migraine (Patient taking differently: Take 25 mg by mouth once daily. 1 pill 3x a day for 2 days, rest left over every 8 hours as needed for migraine), Disp: 15 capsule, Rfl: 3    hydrOXYzine pamoate (VISTARIL) 25 MG Cap, Take 1 capsule (25 mg total) by mouth 3 (three) times daily as needed., Disp: 30 capsule, Rfl: 3    lidocaine HCl 3 % Crea, APPLY 2 grams to the painful area THREE TIMES A DAY, Disp: , Rfl: 3    metoprolol succinate (TOPROL-XL) 25 MG 24 hr tablet, Take 25 mg by mouth 2 (two) times daily. , Disp: , Rfl:     midodrine (PROAMATINE) 2.5 MG Tab, Take 2.5 mg by mouth 2 (two) times daily with meals. , Disp: , Rfl:     mupirocin (BACTROBAN) 2 % ointment, 2 (two) times daily., Disp: , Rfl: 0    nitroGLYCERIN (NITROSTAT) 0.4 MG SL tablet, Place 0.4 mg under the tongue every 5 (five) minutes as needed for Chest pain., Disp: , Rfl:     NON FORMULARY MEDICATION, Apply topically 4 (four) times daily as needed. Gabapentin 5%, Orphenadrine 2%, Lidocaine 2%, Prilocaine 2%, Disp: , Rfl:     omega-3 acid ethyl esters (LOVAZA) 1 gram capsule, Take 2 capsules by mouth 2 (two) times daily., Disp: , Rfl: 3    oxybutynin  (DITROPAN-XL) 10 MG 24 hr tablet, TAKE 1 TABLET(10 MG) BY MOUTH TWICE DAILY, Disp: 180 tablet, Rfl: 3    oxycodone-acetaminophen (PERCOCET)  mg per tablet, Take 1 tablet by mouth every 4 (four) hours as needed for Pain., Disp: 30 tablet, Rfl: 0    pravastatin (PRAVACHOL) 80 MG tablet, Take 80 mg by mouth once daily., Disp: , Rfl: 4    RANEXA 1,000 mg Tb12, Take 1,000 mg by mouth 2 (two) times daily., Disp: , Rfl: 3    RESTASIS 0.05 % ophthalmic emulsion, Place 1 drop into both eyes 2 (two) times daily., Disp: , Rfl: 4    tamsulosin (FLOMAX) 0.4 mg Cap, Take 2 capsules (0.8 mg total) by mouth once daily., Disp: 180 capsule, Rfl: 3    tizanidine (ZANAFLEX) 4 MG tablet, ONE TABLET BY MOUTH IN THE EVENING, Disp: , Rfl: 2    traZODone (DESYREL) 150 MG tablet, Take 1 tablet (150 mg total) by mouth nightly as needed for Insomnia., Disp: 90 tablet, Rfl: 0    XARELTO 15 mg Tab, ONE TABLET BY MOUTH TWICE DAILY FOR 21 DAYS, Disp: , Rfl: 0    EXAMINATION    Vitals:    02/05/25 1017   BP: (!) 156/72   Pulse: 87     BP (!) 156/72   Pulse 87   Wt 101.8 kg (224 lb 6.9 oz)   BMI 33.14 kg/m²      CONSTITUTIONAL  General Appearance: well nourished    MUSCULOSKELETAL  Muscle Strength and Tone: normal strength and tone  Abnormal Involuntary Movements: no abnormal movement noted  Gait and Station: normal gait    PSYCHIATRIC   Level of Consciousness: alert  Orientation: oriented to person, place and time  Grooming: well groomed  Psychomotor Behavior: no restlessness/agitation  Speech: normal in rate, rhythm and volume  Language: normal vocabulary  Mood: anxious at times  Affect: full range and appropriate  Thought Process: logical and goal directed  Associations: intact associations  Thought Content: no SI/HI  Memory: grossly intact  Attention: intact to content of interview  Fund of Knowledge: appears adequate  Insight: good  Judgement: good    MEDICAL DECISION MAKING    DIAGNOSES  Anxiety d/o, unspecified    PROBLEM LIST AND  MANAGEMENT PLANS  - anxiety:   Continue Celexa, Buspar, prn Trazodone, and prn Hydroxyzine as above  - rtc 3 months     Time with patient: 20 min    LABORATORY DATA  No visits with results within 3 Month(s) from this visit.   Latest known visit with results is:   Admission on 08/26/2024, Discharged on 08/26/2024   Component Date Value Ref Range Status    Specimen UA 08/26/2024 Urine, Clean Catch   Final    Color, UA 08/26/2024 Colorless (A)  Yellow, Straw, Leonila Final    Appearance, UA 08/26/2024 Clear  Clear Final    pH, UA 08/26/2024 6.0  5.0 - 8.0 Final    Specific Gravity, UA 08/26/2024 >1.030 (A)  1.005 - 1.030 Final    Protein, UA 08/26/2024 Negative  Negative Final    Comment: Recommend a 24 hour urine protein or a urine   protein/creatinine ratio if globulin induced proteinuria is  clinically suspected.      Glucose, UA 08/26/2024 4+ (A)  Negative Final    Ketones, UA 08/26/2024 Negative  Negative Final    Bilirubin (UA) 08/26/2024 Negative  Negative Final    Occult Blood UA 08/26/2024 Negative  Negative Final    Nitrite, UA 08/26/2024 Negative  Negative Final    Urobilinogen, UA 08/26/2024 Negative  <2.0 EU/dL Final    Leukocytes, UA 08/26/2024 Negative  Negative Final    Chlamydia, Amplified DNA 08/26/2024 Not Detected  Not Detected Final    N gonorrhoeae, amplified DNA 08/26/2024 Not Detected  Not Detected Final    Bacteria 08/26/2024 None  None-Occ /hpf Final    Yeast, UA 08/26/2024 None  None Final    Microscopic Comment 08/26/2024 SEE COMMENT   Final    Comment: Other formed elements not mentioned in the report are not   present in the microscopic examination.       POCT Glucose 08/26/2024 186 (H)  70 - 110 mg/dL Final           Sampson Luke                             Patient Reported Weight (Optional - Include Units): 75 kg

## 2025-04-02 ENCOUNTER — OFFICE VISIT (OUTPATIENT)
Dept: UROLOGY | Facility: CLINIC | Age: 65
End: 2025-04-02
Payer: MEDICARE

## 2025-04-02 VITALS — WEIGHT: 224.88 LBS | BODY MASS INDEX: 33.21 KG/M2

## 2025-04-02 DIAGNOSIS — N40.0 BPH WITHOUT OBSTRUCTION/LOWER URINARY TRACT SYMPTOMS: ICD-10-CM

## 2025-04-02 DIAGNOSIS — N50.812 PAIN IN BOTH TESTICLES: Primary | ICD-10-CM

## 2025-04-02 DIAGNOSIS — N50.811 PAIN IN BOTH TESTICLES: Primary | ICD-10-CM

## 2025-04-02 DIAGNOSIS — R39.15 URINARY URGENCY: ICD-10-CM

## 2025-04-02 PROCEDURE — 99214 OFFICE O/P EST MOD 30 MIN: CPT | Mod: PBBFAC | Performed by: UROLOGY

## 2025-04-02 PROCEDURE — 99214 OFFICE O/P EST MOD 30 MIN: CPT | Mod: S$PBB,,, | Performed by: UROLOGY

## 2025-04-02 PROCEDURE — 99999 PR PBB SHADOW E&M-EST. PATIENT-LVL IV: CPT | Mod: PBBFAC,,, | Performed by: UROLOGY

## 2025-04-02 RX ORDER — OXYBUTYNIN CHLORIDE 10 MG/1
10 TABLET, EXTENDED RELEASE ORAL 2 TIMES DAILY
Qty: 180 TABLET | Refills: 3 | Status: SHIPPED | OUTPATIENT
Start: 2025-04-02

## 2025-04-02 RX ORDER — TAMSULOSIN HYDROCHLORIDE 0.4 MG/1
0.8 CAPSULE ORAL DAILY
Qty: 180 CAPSULE | Refills: 3 | Status: SHIPPED | OUTPATIENT
Start: 2025-04-02

## 2025-04-02 NOTE — PROGRESS NOTES
Subjective:       Patient ID: Min Nunez is a 64 y.o. male The patient's last visit with me was on 12/2/2024.     Chief Complaint:   Chief Complaint   Patient presents with    Follow-up         Orchalgia  He had a bilateral epididymectomy on 8/5/2016.  He continues to have right sided swelling and bilateral pain.  His right sided pain is worse than left.   He complains of pain with sitting, standing, getting up to stand, after roly movements, lifting, and walking.  He has difficulty walking or performing exercises.    10/20/2021   He continues to have pain, and it seems worse recently.   He denies any hematuria, dysuria, or trauma.      1/28/2022  His pain is about the same.  He is trying to avoid heavy lifting.     7/29/2022  He is about the same.  He had a heart attack recently.  He had an angioplasty.    10/28/2022  His pain is about the same.  No new issues.    6/16/2023  The orchalgia is about the same.  He does not want any surgery.  He is back with an ultrasound.    12/18/2023  He feels about the same.  He has some neck pain and scrotal pain.    6/21/2024  He recently had a epidural, however, it was not helpful.    8/29/2024  He went to the ED recently with groin pain.  He is here for evaluation and MARCIA review.  Of note, he has had more back pain especially in his lower back.   His testicular pain does not change with position.    12/2/2024  He continues to have pain.  He feels his spine issues are getting worse.  He does not want surgery.    04/02/2025          Urinary frequency and Urgency Incontinence  He has some frequency and urgency that is managed with Ditropan XL 10 mg and Myrbetriq 25 mg and Flomax.  [employer] is no longer covered.      1/28/2022  IPSS Questionnaire (AUA-7):18/4 7/29/2022  IPSS Questionnaire (AUA-7):  19/5    10/28/2022  He is doing okay with Oxybutynin XL 10 mg BID, and Flomax BID.  He has noted some penile irritation after intercourse, he has been using his wife's estradiol  cream.    IPSS Questionnaire (AUA-7):  17/3    6/16/2023    He is taking Flomax and Oxybutynin.  Myrbetriq is too expensive.  IPSS Questionnaire (AUA-7):  24/3    12/18/2023  He is concerned about having kidney stones.  His brother recently had issues with stones.  He has occasional back pain.  He is taking Flomax and Oxybutynin.    6/21/2024  He is still voiding frequently.  He is taking Flomax and Oxybutynin.    04/02/2025  He is doing okay with Flomax and Oxybutynin.      ACTIVE MEDICAL ISSUES:  Patient Active Problem List   Diagnosis    Chest pain, exertional    Coronary artery disease, occlusive    Coronary atherosclerosis    Orchalgia    Dizziness    Syncope    Abnormal brain MRI    Migraines    Atypical migraine    HA (headache)    Numbness    Facial droop    Faintness    Groin pain    Constipation    Hypotension    Urinary urgency    Nocturia more than twice per night    Pneumonia of right lower lobe due to infectious organism    Hypertension    Mixed hyperlipidemia    Morbid obesity    Anxiety    Left varicocele    Epididymal cyst       ALLERGIES AND MEDICATIONS: updated and reviewed.  Review of patient's allergies indicates:  No Known Allergies  Current Outpatient Medications   Medication Sig    albuterol (PROAIR HFA) 90 mcg/actuation inhaler Inhale 2 puffs into the lungs every 6 (six) hours as needed for Wheezing.    busPIRone (BUSPAR) 15 MG tablet Take 1 tablet (15 mg total) by mouth 3 (three) times daily.    chlorhexidine (PERIDEX) 0.12 % solution swish AND SPIT FIFTEEN MILLILITERS BY MOUTH TWICE DAILY    citalopram (CELEXA) 40 MG tablet TAKE 1 TABLET(40 MG) BY MOUTH EVERY DAY    clotrimazole-betamethasone 1-0.05% (LOTRISONE) cream APPLY TOPICALLY TO THE AFFECTED AREA TWICE DAILY    divalproex (DEPAKOTE) 250 MG EC tablet 1 pill in morning, 1 pill in evening (Patient taking differently: Take 500 mg by mouth 2 (two) times daily.)    fenofibrate 160 MG Tab Take 160 mg by  mouth once daily.    gabapentin (NEURONTIN) 300 MG capsule Take 2 capsules (600 mg total) by mouth once daily.    hydrOXYzine HCL (ATARAX) 25 MG tablet Take 1 tablet (25 mg total) by mouth 3 (three) times daily as needed for Anxiety.    hydrOXYzine pamoate (VISTARIL) 25 MG Cap 1 pill 3x a day for 2 days, rest left over every 8 hours as needed for migraine (Patient taking differently: Take 25 mg by mouth once daily. 1 pill 3x a day for 2 days, rest left over every 8 hours as needed for migraine)    hydrOXYzine pamoate (VISTARIL) 25 MG Cap Take 1 capsule (25 mg total) by mouth 3 (three) times daily as needed.    lidocaine HCl 3 % Crea APPLY 2 grams to the painful area THREE TIMES A DAY    metoprolol succinate (TOPROL-XL) 25 MG 24 hr tablet Take 25 mg by mouth 2 (two) times daily.     midodrine (PROAMATINE) 2.5 MG Tab Take 2.5 mg by mouth 2 (two) times daily with meals.     mupirocin (BACTROBAN) 2 % ointment 2 (two) times daily.    nitroGLYCERIN (NITROSTAT) 0.4 MG SL tablet Place 0.4 mg under the tongue every 5 (five) minutes as needed for Chest pain.    NON FORMULARY MEDICATION Apply topically 4 (four) times daily as needed. Gabapentin 5%, Orphenadrine 2%, Lidocaine 2%, Prilocaine 2%    omega-3 acid ethyl esters (LOVAZA) 1 gram capsule Take 2 capsules by mouth 2 (two) times daily.    oxybutynin (DITROPAN-XL) 10 MG 24 hr tablet Take 1 tablet (10 mg total) by mouth 2 (two) times daily.    oxycodone-acetaminophen (PERCOCET)  mg per tablet Take 1 tablet by mouth every 4 (four) hours as needed for Pain.    pravastatin (PRAVACHOL) 80 MG tablet Take 80 mg by mouth once daily.    RANEXA 1,000 mg Tb12 Take 1,000 mg by mouth 2 (two) times daily.    RESTASIS 0.05 % ophthalmic emulsion Place 1 drop into both eyes 2 (two) times daily.    tamsulosin (FLOMAX) 0.4 mg Cap Take 2 capsules (0.8 mg total) by mouth once daily.    tizanidine (ZANAFLEX) 4 MG tablet ONE TABLET BY MOUTH IN THE EVENING    traZODone  (DESYREL) 150 MG tablet Take 1 tablet (150 mg total) by mouth nightly as needed for Insomnia.    XARELTO 15 mg Tab ONE TABLET BY MOUTH TWICE DAILY FOR 21 DAYS     No current facility-administered medications for this visit.       Review of Systems   Constitutional:  Negative for activity change, fatigue, fever and unexpected weight change.   HENT:  Negative for congestion.    Eyes:  Negative for redness.   Respiratory:  Negative for chest tightness and shortness of breath.    Cardiovascular:  Negative for chest pain and leg swelling.   Gastrointestinal:  Negative for abdominal pain, constipation, diarrhea, nausea and vomiting.   Genitourinary:  Positive for testicular pain and urgency. Negative for dysuria, flank pain, frequency, hematuria, penile pain, penile swelling and scrotal swelling.   Musculoskeletal:  Negative for arthralgias and back pain.   Neurological:  Negative for dizziness and light-headedness.   Psychiatric/Behavioral:  Negative for behavioral problems and confusion. The patient is not nervous/anxious.    All other systems reviewed and are negative.      Objective:      Vitals:    04/02/25 1336   Weight: 102 kg (224 lb 13.9 oz)                       Physical Exam  Vitals and nursing note reviewed.   Constitutional:       Appearance: He is well-developed.   HENT:      Head: Normocephalic.   Eyes:      Conjunctiva/sclera: Conjunctivae normal.   Neck:      Thyroid: No thyromegaly.      Trachea: No tracheal deviation.   Cardiovascular:      Rate and Rhythm: Normal rate.      Heart sounds: Normal heart sounds.   Pulmonary:      Effort: Pulmonary effort is normal. No respiratory distress.      Breath sounds: Normal breath sounds. No wheezing.   Abdominal:      General: Bowel sounds are normal.      Palpations: Abdomen is soft.      Tenderness: There is no abdominal tenderness. There is no rebound.      Hernia: No hernia is present.   Genitourinary:     Testes:         Right: Mass and tenderness present.  Swelling not present.         Left: Tenderness present. Swelling not present.      Comments: Right epididymal cyst  Musculoskeletal:         General: No tenderness. Normal range of motion.      Cervical back: Normal range of motion and neck supple.   Lymphadenopathy:      Cervical: No cervical adenopathy.   Skin:     General: Skin is warm and dry.      Findings: No erythema or rash.   Neurological:      Mental Status: He is alert and oriented to person, place, and time.   Psychiatric:         Behavior: Behavior normal.         Thought Content: Thought content normal.         Judgment: Judgment normal.       Urine dipstick shows negative for all components.  Micro exam: negative for WBC's or RBC's.    Component Ref Range & Units 3 d ago 9 mo ago 1 yr ago 3 yr ago 4 yr ago 5 yr ago 6 yr ago   PSA Diagnostic 0.00 - 4.00 ng/mL 0.28 0.43 CM 0.42 CM 0.39 CM 0.43 CM 0.33 CM 0.44 CM   Comment: The testing method is a chemiluminescent microparticle immunoassay  manufactured by [employer] and performed on the [employer]  or  [employer] system. Values obtained with different assay manufacturers  for  methods may be different and cannot be used interchangeably.  PSA Expected levels:  Hormonal Therapy: <0.05 ng/ml  Prostatectomy: <0.01 ng/ml  Radiation Therapy: <1.00 ng/ml   Resulting Agency  [facility]              Narrative  Performed by: OCLAUREA  PSA 3 months      Specimen Collected: 06/18/24 09:25 CDT               9/2021  [facility] Scrotal US  Good blood flow, bilaterally  Right 4.6 cm spermatocele    4/19/2022  Good blood flow bilaterally  Right 4.7 cm spermatocele    3/2023  Good Blood flow  Left epididymal head cyst 1.7 cm  Right epididymal head cyst 5.2 cm    6/2024  Good Flow  Left sided cyst 0.6, rete testis  Right 4.4 cm        US Scrotum And Testicles  Order: [id number]  Status: Final result       Visible to patient: Yes (not seen)       Next appt: 08/29/2024 at 09:30 AM in Urology ([facility]  Sanya Hendrickson MD)       Dx: Right Epididymal cyst vs. hydrocele    0 Result Notes  Details    Reading Physician Reading Date Result Priority   Rey Matamoros Jr., MD  [phone] 8/26/2024 STAT     Narrative & Impression  EXAMINATION:  US SCROTUM AND TESTICLES     CLINICAL HISTORY:  Testicular pain, unspecified     TECHNIQUE:  Sonography of the scrotum and testes.     COMPARISON:  Similar study 08/11/2020     FINDINGS:  Right Testicle:     *Size: 4.3 x 3.2 x 2.8 cm  *Appearance: Mildly heterogeneous echotexture compatible with senescent changes.  Tubular ectasia of the rete testes noted again.  *Flow: Normal arterial and venous flow  *Epididymis: Epididymal parenchyma is not well seen.  Large right epididymal head cyst versus loculated hydrocele again noted, with low level internal echoes throughout measuring 5 x 3 x 3 cm.  *Hydrocele: See above  *Varicocele: None.  .     Left Testicle:     *Size: 4.1 x 3.4 x 2.9 cm  *Appearance: Tubular ectasia of the rete testes and mildly heterogeneous echotexture.  *Flow: Normal arterial and venous flow  *Epididymis: Poorly seen  *Hydrocele: None.  *Varicocele: Present with rouleaux pattern on ultrasound indicative of slow flow.  .     Other findings: None.     Impression:     Left-sided varicocele with rouleaux pattern suggesting slow flow possible impending thrombosis.     Stable large right epididymal head cyst versus loculated hydrocele.        Electronically signed by:Rey Golden Jr  Date:                                            08/26/2024  Time:                                           16:04           Exam Ended: 08/26/24 15:47 CDT                 Assessment:       1. Pain in both testicles    2. BPH without obstruction/lower urinary tract symptoms    3. Urinary urgency                              Plan:       1. Pain in both testicles (Primary)    - US Scrotum And Testicles; Future  - US Scrotum And Testicles    2. BPH without obstruction/lower urinary  tract symptoms    - Prostate Specific Antigen, Diagnostic; Future  - tamsulosin (FLOMAX) 0.4 mg Cap; Take 2 capsules (0.8 mg total) by mouth once daily.  Dispense: 180 capsule; Refill: 3    3. Urinary urgency    - oxybutynin (DITROPAN-XL) 10 MG 24 hr tablet; Take 1 tablet (10 mg total) by mouth 2 (two) times daily.  Dispense: 180 tablet; Refill: 3       Follow up in about 3 months (around 7/2/2025) for Follow up Established, Review PSA, Review X-ray.

## 2025-05-07 ENCOUNTER — TELEPHONE (OUTPATIENT)
Dept: UROLOGY | Facility: HOSPITAL | Age: 65
End: 2025-05-07
Payer: MEDICARE

## 2025-05-07 DIAGNOSIS — N50.811 PAIN IN BOTH TESTICLES: Primary | ICD-10-CM

## 2025-05-07 DIAGNOSIS — N50.812 PAIN IN BOTH TESTICLES: Primary | ICD-10-CM

## 2025-05-07 NOTE — TELEPHONE ENCOUNTER
----- Message from Quan Alvarez sent at 5/7/2025  8:22 AM CDT -----  [facility] called and has asked for some orders for testicle doppler to the referral

## 2025-05-13 ENCOUNTER — OFFICE VISIT (OUTPATIENT)
Dept: PSYCHIATRY | Facility: CLINIC | Age: 65
End: 2025-05-13
Payer: MEDICARE

## 2025-05-13 VITALS
WEIGHT: 222.44 LBS | HEART RATE: 77 BPM | DIASTOLIC BLOOD PRESSURE: 86 MMHG | SYSTOLIC BLOOD PRESSURE: 136 MMHG | BODY MASS INDEX: 32.85 KG/M2

## 2025-05-13 DIAGNOSIS — F41.9 ANXIETY: Primary | ICD-10-CM

## 2025-05-13 PROCEDURE — 99211 OFF/OP EST MAY X REQ PHY/QHP: CPT | Mod: PBBFAC | Performed by: PSYCHIATRY & NEUROLOGY

## 2025-05-13 PROCEDURE — 99214 OFFICE O/P EST MOD 30 MIN: CPT | Mod: S$PBB,,, | Performed by: PSYCHIATRY & NEUROLOGY

## 2025-05-13 PROCEDURE — 99999 PR PBB SHADOW E&M-EST. PATIENT-LVL I: CPT | Mod: PBBFAC,,, | Performed by: PSYCHIATRY & NEUROLOGY

## 2025-05-13 RX ORDER — BUSPIRONE HYDROCHLORIDE 15 MG/1
15 TABLET ORAL 3 TIMES DAILY
Qty: 270 TABLET | Refills: 1 | Status: SHIPPED | OUTPATIENT
Start: 2025-05-13

## 2025-05-13 RX ORDER — HYDROXYZINE HYDROCHLORIDE 25 MG/1
25 TABLET, FILM COATED ORAL 3 TIMES DAILY PRN
Qty: 270 TABLET | Refills: 1 | Status: SHIPPED | OUTPATIENT
Start: 2025-05-13

## 2025-05-13 RX ORDER — CITALOPRAM 40 MG/1
TABLET ORAL
Qty: 90 TABLET | Refills: 1 | Status: SHIPPED | OUTPATIENT
Start: 2025-05-13

## 2025-05-13 RX ORDER — TRAZODONE HYDROCHLORIDE 150 MG/1
150 TABLET ORAL NIGHTLY PRN
Qty: 90 TABLET | Refills: 0 | Status: SHIPPED | OUTPATIENT
Start: 2025-05-13

## 2025-05-13 NOTE — PROGRESS NOTES
ESTABLISHED OUTPATIENT VISIT   E/M LEVEL 4: [id number]    ENCOUNTER DATE: 5/13/2025  SITE: [facility], [address]    HISTORY    CHIEF COMPLAINT   Min Nunez is a 64 y.o. male who presents for follow up of anxiety/depression.    HPI    Continues to struggle with multiple physical issues. Physical pain remains troublesome.    Overall appears stable psychiatrically.    Satisfied with current psychotropic medication regimen.    Watches TV.    Enjoying grandson.    Mother in law has medical issues.    Psychiatric Review Of Systems - Is patient experiencing or having changes in:  sleep: varies  appetite: no  weight: no  energy/anergy: no  interest/pleasure/anhedonia: no  somatic symptoms: no  libido: no  anxiety/panic: anxiety at times  guilty/hopelessness: no  concentration: no  S.I.B.s/risky behavior: no  Irritability: no  Racing thoughts: no  Impulsive behaviors: no  Paranoia:no  AVH:no    Recent alcohol: no  Recent drug: no    Medical ROS   Multiple physical problems     PAST MEDICAL, FAMILY AND SOCIAL HISTORY: The patient's past medical, family and social history have been reviewed and updated as appropriate within the electronic medical record - see encounter notes.    PSYCHOTROPIC MEDICATIONS   Trazodone  mg at bedtime prn, Buspar 15 mg tid, Celexa 40 mg qam, Depakote 500 mg bid[for headaches], Hydroxyzine prn for migraines[takes 25 mg bid-tid]    Scheduled and PRN Medications     Current Outpatient Medications:     albuterol (PROAIR HFA) 90 mcg/actuation inhaler, Inhale 2 puffs into the lungs every 6 (six) hours as needed for Wheezing., Disp: 18 g, Rfl: 0    busPIRone (BUSPAR) 15 MG tablet, Take 1 tablet (15 mg total) by mouth 3 (three) times daily., Disp: 270 tablet, Rfl: 1    chlorhexidine (PERIDEX) 0.12 % solution, swish AND SPIT FIFTEEN MILLILITERS BY MOUTH TWICE DAILY, Disp: , Rfl: 0    citalopram (CELEXA) 40 MG tablet, TAKE 1 TABLET(40 MG) BY MOUTH EVERY DAY, Disp: 90 tablet, Rfl: 1     clotrimazole-betamethasone 1-0.05% (LOTRISONE) cream, APPLY TOPICALLY TO THE AFFECTED AREA TWICE DAILY, Disp: 45 g, Rfl: 1    divalproex (DEPAKOTE) 250 MG EC tablet, 1 pill in morning, 1 pill in evening (Patient taking differently: Take 500 mg by mouth 2 (two) times daily.), Disp: 60 tablet, Rfl: 6    fenofibrate 160 MG Tab, Take 160 mg by mouth once daily., Disp: , Rfl:     gabapentin (NEURONTIN) 300 MG capsule, Take 2 capsules (600 mg total) by mouth once daily., Disp: 180 capsule, Rfl: 11    hydrOXYzine HCL (ATARAX) 25 MG tablet, Take 1 tablet (25 mg total) by mouth 3 (three) times daily as needed for Anxiety., Disp: 270 tablet, Rfl: 1    hydrOXYzine pamoate (VISTARIL) 25 MG Cap, 1 pill 3x a day for 2 days, rest left over every 8 hours as needed for migraine (Patient taking differently: Take 25 mg by mouth once daily. 1 pill 3x a day for 2 days, rest left over every 8 hours as needed for migraine), Disp: 15 capsule, Rfl: 3    hydrOXYzine pamoate (VISTARIL) 25 MG Cap, Take 1 capsule (25 mg total) by mouth 3 (three) times daily as needed., Disp: 30 capsule, Rfl: 3    lidocaine HCl 3 % Crea, APPLY 2 grams to the painful area THREE TIMES A DAY, Disp: , Rfl: 3    metoprolol succinate (TOPROL-XL) 25 MG 24 hr tablet, Take 25 mg by mouth 2 (two) times daily. , Disp: , Rfl:     midodrine (PROAMATINE) 2.5 MG Tab, Take 2.5 mg by mouth 2 (two) times daily with meals. , Disp: , Rfl:     mupirocin (BACTROBAN) 2 % ointment, 2 (two) times daily., Disp: , Rfl: 0    nitroGLYCERIN (NITROSTAT) 0.4 MG SL tablet, Place 0.4 mg under the tongue every 5 (five) minutes as needed for Chest pain., Disp: , Rfl:     NON FORMULARY MEDICATION, Apply topically 4 (four) times daily as needed. Gabapentin 5%, Orphenadrine 2%, Lidocaine 2%, Prilocaine 2%, Disp: , Rfl:     omega-3 acid ethyl esters (LOVAZA) 1 gram capsule, Take 2 capsules by mouth 2 (two) times daily., Disp: , Rfl: 3    oxybutynin (DITROPAN-XL) 10 MG 24 hr tablet, Take 1 tablet (10  mg total) by mouth 2 (two) times daily., Disp: 180 tablet, Rfl: 3    oxycodone-acetaminophen (PERCOCET)  mg per tablet, Take 1 tablet by mouth every 4 (four) hours as needed for Pain., Disp: 30 tablet, Rfl: 0    pravastatin (PRAVACHOL) 80 MG tablet, Take 80 mg by mouth once daily., Disp: , Rfl: 4    RANEXA 1,000 mg Tb12, Take 1,000 mg by mouth 2 (two) times daily., Disp: , Rfl: 3    RESTASIS 0.05 % ophthalmic emulsion, Place 1 drop into both eyes 2 (two) times daily., Disp: , Rfl: 4    tamsulosin (FLOMAX) 0.4 mg Cap, Take 2 capsules (0.8 mg total) by mouth once daily., Disp: 180 capsule, Rfl: 3    tizanidine (ZANAFLEX) 4 MG tablet, ONE TABLET BY MOUTH IN THE EVENING, Disp: , Rfl: 2    traZODone (DESYREL) 150 MG tablet, Take 1 tablet (150 mg total) by mouth nightly as needed for Insomnia., Disp: 90 tablet, Rfl: 0    XARELTO 15 mg Tab, ONE TABLET BY MOUTH TWICE DAILY FOR 21 DAYS, Disp: , Rfl: 0    EXAMINATION    Vitals:    05/13/25 1251   BP: 136/86   Pulse: 77     /86   Pulse 77   Wt 100.9 kg (222 lb 7.1 oz)   BMI 32.85 kg/m²      CONSTITUTIONAL  General Appearance: well nourished    MUSCULOSKELETAL  Muscle Strength and Tone: normal strength and tone  Abnormal Involuntary Movements: no abnormal movement noted  Gait and Station: normal gait    PSYCHIATRIC   Level of Consciousness: alert  Orientation: oriented to person, place and time  Grooming: well groomed  Psychomotor Behavior: no restlessness/agitation  Speech: normal in rate, rhythm and volume  Language: normal vocabulary  Mood: anxious at times  Affect: full range and appropriate  Thought Process: logical and goal directed  Associations: intact associations  Thought Content: no SI/HI  Memory: grossly intact  Attention: intact to content of interview  Fund of Knowledge: appears adequate  Insight: good  Judgement: good    MEDICAL DECISION MAKING    DIAGNOSES  Anxiety d/o, unspecified    PROBLEM LIST AND MANAGEMENT PLANS  - anxiety:   Continue  Celexa, Buspar, prn Trazodone, and prn Hydroxyzine as above  - rtc 3 months     Time with patient: 15 min    LABORATORY DATA  No visits with results within 3 Month(s) from this visit.   Latest known visit with results is:   Admission on 08/26/2024, Discharged on 08/26/2024   Component Date Value Ref Range Status    Specimen UA 08/26/2024 Urine, Clean Catch   Final    Color, UA 08/26/2024 Colorless (A)  Yellow, Straw, Leonila Final    Appearance, UA 08/26/2024 Clear  Clear Final    pH, UA 08/26/2024 6.0  5.0 - 8.0 Final    Specific Gravity, UA 08/26/2024 >1.030 (A)  1.005 - 1.030 Final    Protein, UA 08/26/2024 Negative  Negative Final    Comment: Recommend a 24 hour urine protein or a urine   protein/creatinine ratio if globulin induced proteinuria is  clinically suspected.      Glucose, UA 08/26/2024 4+ (A)  Negative Final    Ketones, UA 08/26/2024 Negative  Negative Final    Bilirubin (UA) 08/26/2024 Negative  Negative Final    Occult Blood UA 08/26/2024 Negative  Negative Final    Nitrite, UA 08/26/2024 Negative  Negative Final    Urobilinogen, UA 08/26/2024 Negative  <2.0 EU/dL Final    Leukocytes, UA 08/26/2024 Negative  Negative Final    Chlamydia, Amplified DNA 08/26/2024 Not Detected  Not Detected Final    N gonorrhoeae, amplified DNA 08/26/2024 Not Detected  Not Detected Final    Bacteria 08/26/2024 None  None-Occ /hpf Final    Yeast, UA 08/26/2024 None  None Final    Microscopic Comment 08/26/2024 SEE COMMENT   Final    Comment: Other formed elements not mentioned in the report are not   present in the microscopic examination.       POCT Glucose 08/26/2024 186 (H)  70 - 110 mg/dL Final           Sapmson Luke

## 2025-06-26 ENCOUNTER — LAB VISIT (OUTPATIENT)
Dept: LAB | Facility: HOSPITAL | Age: 65
End: 2025-06-26
Attending: UROLOGY
Payer: MEDICARE

## 2025-06-26 DIAGNOSIS — N40.0 BPH WITHOUT OBSTRUCTION/LOWER URINARY TRACT SYMPTOMS: ICD-10-CM

## 2025-06-26 LAB — PSA SERPL-MCNC: 0.43 NG/ML

## 2025-06-26 PROCEDURE — 84153 ASSAY OF PSA TOTAL: CPT

## 2025-06-26 PROCEDURE — 36415 COLL VENOUS BLD VENIPUNCTURE: CPT | Mod: PO

## 2025-06-27 ENCOUNTER — RESULTS FOLLOW-UP (OUTPATIENT)
Dept: UROLOGY | Facility: CLINIC | Age: 65
End: 2025-06-27

## 2025-07-02 ENCOUNTER — OFFICE VISIT (OUTPATIENT)
Dept: UROLOGY | Facility: CLINIC | Age: 65
End: 2025-07-02
Payer: MEDICARE

## 2025-07-02 VITALS — HEIGHT: 69 IN | WEIGHT: 222.25 LBS | BODY MASS INDEX: 32.92 KG/M2

## 2025-07-02 DIAGNOSIS — N40.0 BPH WITHOUT OBSTRUCTION/LOWER URINARY TRACT SYMPTOMS: ICD-10-CM

## 2025-07-02 DIAGNOSIS — N50.811 PAIN IN BOTH TESTICLES: Primary | ICD-10-CM

## 2025-07-02 DIAGNOSIS — N50.812 PAIN IN BOTH TESTICLES: Primary | ICD-10-CM

## 2025-07-02 DIAGNOSIS — R39.15 URINARY URGENCY: ICD-10-CM

## 2025-07-02 PROCEDURE — 99213 OFFICE O/P EST LOW 20 MIN: CPT | Mod: S$PBB,,, | Performed by: UROLOGY

## 2025-07-02 PROCEDURE — 99214 OFFICE O/P EST MOD 30 MIN: CPT | Mod: PBBFAC | Performed by: UROLOGY

## 2025-07-02 PROCEDURE — 99999 PR PBB SHADOW E&M-EST. PATIENT-LVL IV: CPT | Mod: PBBFAC,,, | Performed by: UROLOGY

## 2025-07-02 RX ORDER — OXYBUTYNIN CHLORIDE 10 MG/1
10 TABLET, EXTENDED RELEASE ORAL 2 TIMES DAILY
Qty: 180 TABLET | Refills: 3 | Status: SHIPPED | OUTPATIENT
Start: 2025-07-02

## 2025-07-02 RX ORDER — TAMSULOSIN HYDROCHLORIDE 0.4 MG/1
0.8 CAPSULE ORAL DAILY
Qty: 180 CAPSULE | Refills: 3 | Status: SHIPPED | OUTPATIENT
Start: 2025-07-02

## 2025-07-02 NOTE — PROGRESS NOTES
Subjective:       Patient ID: Min Nunez is a 64 y.o. male The patient's last visit with me was on 4/2/2025.     Chief Complaint:   Chief Complaint   Patient presents with    Follow-up    Groin Pain         Orchalgia  He had a bilateral epididymectomy on 8/5/2016.  He continues to have right sided swelling and bilateral pain.  His right sided pain is worse than left.   He complains of pain with sitting, standing, getting up to stand, after roly movements, lifting, and walking.  He has difficulty walking or performing exercises.    10/20/2021   He continues to have pain, and it seems worse recently.   He denies any hematuria, dysuria, or trauma.      1/28/2022  His pain is about the same.  He is trying to avoid heavy lifting.     7/29/2022  He is about the same.  He had a heart attack recently.  He had an angioplasty.    10/28/2022  His pain is about the same.  No new issues.    6/16/2023  The orchalgia is about the same.  He does not want any surgery.  He is back with an ultrasound.    12/18/2023  He feels about the same.  He has some neck pain and scrotal pain.    6/21/2024  He recently had a epidural, however, it was not helpful.    8/29/2024  He went to the ED recently with groin pain.  He is here for evaluation and MARCIA review.  Of note, he has had more back pain especially in his lower back.   His testicular pain does not change with position.    12/2/2024  He continues to have pain.  He feels his spine issues are getting worse.  He does not want surgery.    07/02/2025  He is about the same.  He continues to have pain but does not want surgery.          Urinary frequency and Urgency Incontinence  He has some frequency and urgency that is managed with Ditropan XL 10 mg and Myrbetriq 25 mg and Flomax.  [employer] is no longer covered.      1/28/2022  IPSS Questionnaire (AUA-7):18/4 7/29/2022  IPSS Questionnaire (AUA-7):  19/5    10/28/2022  He is doing okay with Oxybutynin XL 10 mg BID, and Flomax BID.  He  has noted some penile irritation after intercourse, he has been using his wife's estradiol cream.    IPSS Questionnaire (AUA-7):  17/3    6/16/2023    He is taking Flomax and Oxybutynin.  Myrbetriq is too expensive.  IPSS Questionnaire (AUA-7):  24/3    12/18/2023  He is concerned about having kidney stones.  His brother recently had issues with stones.  He has occasional back pain.  He is taking Flomax and Oxybutynin.    6/21/2024  He is still voiding frequently.  He is taking Flomax and Oxybutynin.    4/2/2024  He is doing okay with Flomax and Oxybutynin.    07/02/2025        ACTIVE MEDICAL ISSUES:  Patient Active Problem List   Diagnosis    Chest pain, exertional    Coronary artery disease, occlusive    Coronary atherosclerosis    Orchalgia    Dizziness    Syncope    Abnormal brain MRI    Migraines    Atypical migraine    HA (headache)    Numbness    Facial droop    Faintness    Groin pain    Constipation    Hypotension    Urinary urgency    Nocturia more than twice per night    Pneumonia of right lower lobe due to infectious organism    Hypertension    Mixed hyperlipidemia    Morbid obesity    Anxiety    Left varicocele    Epididymal cyst       ALLERGIES AND MEDICATIONS: updated and reviewed.  Review of patient's allergies indicates:  No Known Allergies  Current Outpatient Medications   Medication Sig    albuterol (PROAIR HFA) 90 mcg/actuation inhaler Inhale 2 puffs into the lungs every 6 (six) hours as needed for Wheezing.    busPIRone (BUSPAR) 15 MG tablet Take 1 tablet (15 mg total) by mouth 3 (three) times daily.    chlorhexidine (PERIDEX) 0.12 % solution swish AND SPIT FIFTEEN MILLILITERS BY MOUTH TWICE DAILY    citalopram (CELEXA) 40 MG tablet TAKE 1 TABLET(40 MG) BY MOUTH EVERY DAY    clotrimazole-betamethasone 1-0.05% (LOTRISONE) cream APPLY TOPICALLY TO THE AFFECTED AREA TWICE DAILY    divalproex (DEPAKOTE) 250 MG EC tablet 1 pill in morning, 1 pill in evening (Patient taking differently: Take 500 mg by  mouth 2 (two) times daily.)    fenofibrate 160 MG Tab Take 160 mg by mouth once daily.    gabapentin (NEURONTIN) 300 MG capsule Take 2 capsules (600 mg total) by mouth once daily.    hydrOXYzine HCL (ATARAX) 25 MG tablet Take 1 tablet (25 mg total) by mouth 3 (three) times daily as needed for Anxiety.    hydrOXYzine pamoate (VISTARIL) 25 MG Cap 1 pill 3x a day for 2 days, rest left over every 8 hours as needed for migraine (Patient taking differently: Take 25 mg by mouth once daily. 1 pill 3x a day for 2 days, rest left over every 8 hours as needed for migraine)    lidocaine HCl 3 % Crea APPLY 2 grams to the painful area THREE TIMES A DAY    metoprolol succinate (TOPROL-XL) 25 MG 24 hr tablet Take 25 mg by mouth 2 (two) times daily.     midodrine (PROAMATINE) 2.5 MG Tab Take 2.5 mg by mouth 2 (two) times daily with meals.     mupirocin (BACTROBAN) 2 % ointment 2 (two) times daily.    nitroGLYCERIN (NITROSTAT) 0.4 MG SL tablet Place 0.4 mg under the tongue every 5 (five) minutes as needed for Chest pain.    NON FORMULARY MEDICATION Apply topically 4 (four) times daily as needed. Gabapentin 5%, Orphenadrine 2%, Lidocaine 2%, Prilocaine 2%    omega-3 acid ethyl esters (LOVAZA) 1 gram capsule Take 2 capsules by mouth 2 (two) times daily.    oxycodone-acetaminophen (PERCOCET)  mg per tablet Take 1 tablet by mouth every 4 (four) hours as needed for Pain.    pravastatin (PRAVACHOL) 80 MG tablet Take 80 mg by mouth once daily.    RANEXA 1,000 mg Tb12 Take 1,000 mg by mouth 2 (two) times daily.    RESTASIS 0.05 % ophthalmic emulsion Place 1 drop into both eyes 2 (two) times daily.    tizanidine (ZANAFLEX) 4 MG tablet ONE TABLET BY MOUTH IN THE EVENING    traZODone (DESYREL) 150 MG tablet Take 1 tablet (150 mg total) by mouth nightly as needed for Insomnia.    XARELTO 15 mg Tab ONE TABLET BY MOUTH TWICE DAILY FOR 21 DAYS    hydrOXYzine pamoate (VISTARIL) 25 MG Cap Take 1 capsule (25 mg total) by mouth 3 (three) times  "daily as needed.    oxybutynin (DITROPAN-XL) 10 MG 24 hr tablet Take 1 tablet (10 mg total) by mouth 2 (two) times daily.    tamsulosin (FLOMAX) 0.4 mg Cap Take 2 capsules (0.8 mg total) by mouth once daily.     No current facility-administered medications for this visit.       Review of Systems   Constitutional:  Negative for activity change, fatigue, fever and unexpected weight change.   HENT:  Negative for congestion.    Eyes:  Negative for redness.   Respiratory:  Negative for chest tightness and shortness of breath.    Cardiovascular:  Negative for chest pain and leg swelling.   Gastrointestinal:  Negative for abdominal pain, constipation, diarrhea, nausea and vomiting.   Genitourinary:  Positive for testicular pain and urgency. Negative for dysuria, flank pain, frequency, hematuria, penile pain, penile swelling and scrotal swelling.   Musculoskeletal:  Negative for arthralgias and back pain.   Neurological:  Negative for dizziness and light-headedness.   Psychiatric/Behavioral:  Negative for behavioral problems and confusion. The patient is not nervous/anxious.    All other systems reviewed and are negative.      Objective:      Vitals:    07/02/25 1256   Weight: 100.8 kg (222 lb 3.6 oz)   Height: 5' 9" (1.753 m)                         Physical Exam    Urine dipstick shows negative for all components.  Micro exam: negative for WBC's or RBC's.        Component  Ref Range & Units (hover) 6 d ago   Prostate Specific Antigen 0.43   Comment: PSA Expected levels:  Hormonal therapy: < 0.05 ng/mL  Prostatectomy: < 0.01 ng/mL  Radiation therapy: < 1.00 ng/mL   Resulting Agency [facility]              Narrative  Performed by: [facility]  The testing method is a chemiluminescent microparticle immunoassay manufactured by [employer] and performed on the [employer] or [employer] system. Values obtained with different assay manufacturers for methods may be different and cannot be used interchangeably.   Specimen Collected: " 06/26/25 12:57 CDT Last Resulted: 06/26/25 16:41 CDT           9/2021  [facility] Scrotal US  Good blood flow, bilaterally  Right 4.6 cm spermatocele    4/19/2022  Good blood flow bilaterally  Right 4.7 cm spermatocele    3/2023  Good Blood flow  Left epididymal head cyst 1.7 cm  Right epididymal head cyst 5.2 cm    6/2024  Good Flow  Left sided cyst 0.6, rete testis  Right 4.4 cm        Reports    Test status: Final Result Date: 05 08 2025 16:16    STUDY  US, Scrotum and Contents   CLINICAL INDICATION  Follow up right-sided fluid collection.   COMPARISON  Scrotal ultrasound 06/10/2024.   PROCEDURE DETAILS  Multiple real-time transscrotal sonographic images were obtained.  Gated doppler and color flow images were acquired as well.   FINDINGS  The right testicle measures 4.2 x 3.4 x 2.7 cm.  Normal vascularity.  Lateral to the right testicle, there is redemonstration of a cystic collection containing scattered internal echoes/debris measuring 4.7 x 3.9 x 2.2 cm, most compatible with a spermatocele. Cysts measuring 0.8 cm in the right epididymal head and 0.3 cm in the right epididymal body.    The left testicle measures 4.2 x 3.9 x 2.4 cm.  Normal vascularity.  Nonspecific irregular wall thickening along the anterior border of the mid testicle.    No evidence of significant hydrocele.   IMPRESSION   1. 4.7 cm spermatocele on the right, not significantly changed compared to prior ultrasound 06/10/2024 when it measured up to 4.4 cm.   2. Additional incidental findings as detailed.   Signature  Electronically Signed:  Mika Kim M.D.  on 05-, 04:16 PM      Assessment:       1. BPH without obstruction/lower urinary tract symptoms    2. Urinary urgency                                Plan:       1. Pain in both testicles (Primary)  stable    2. BPH without obstruction/lower urinary tract symptoms    - tamsulosin (FLOMAX) 0.4 mg Cap; Take 2 capsules (0.8 mg total) by mouth once daily.  Dispense: 180 capsule;  Refill: 3    3. Urinary urgency    - oxybutynin (DITROPAN-XL) 10 MG 24 hr tablet; Take 1 tablet (10 mg total) by mouth 2 (two) times daily.  Dispense: 180 tablet; Refill: 3       Follow up in about 3 months (around 10/2/2025) for Follow up Established.

## 2025-08-19 ENCOUNTER — OFFICE VISIT (OUTPATIENT)
Dept: PSYCHIATRY | Facility: CLINIC | Age: 65
End: 2025-08-19
Payer: MEDICARE

## 2025-08-19 VITALS
DIASTOLIC BLOOD PRESSURE: 72 MMHG | HEART RATE: 86 BPM | BODY MASS INDEX: 33.11 KG/M2 | SYSTOLIC BLOOD PRESSURE: 129 MMHG | WEIGHT: 224.19 LBS

## 2025-08-19 DIAGNOSIS — F41.9 ANXIETY: Primary | ICD-10-CM

## 2025-08-19 PROCEDURE — 99214 OFFICE O/P EST MOD 30 MIN: CPT | Mod: S$PBB,,, | Performed by: PSYCHIATRY & NEUROLOGY

## 2025-08-19 PROCEDURE — 99999 PR PBB SHADOW E&M-EST. PATIENT-LVL I: CPT | Mod: PBBFAC,,, | Performed by: PSYCHIATRY & NEUROLOGY

## 2025-08-19 PROCEDURE — 99211 OFF/OP EST MAY X REQ PHY/QHP: CPT | Mod: PBBFAC | Performed by: PSYCHIATRY & NEUROLOGY
